# Patient Record
Sex: MALE | Race: WHITE | Employment: OTHER | URBAN - METROPOLITAN AREA
[De-identification: names, ages, dates, MRNs, and addresses within clinical notes are randomized per-mention and may not be internally consistent; named-entity substitution may affect disease eponyms.]

---

## 2019-03-26 ENCOUNTER — HOSPITAL ENCOUNTER (OUTPATIENT)
Dept: GENERAL RADIOLOGY | Age: 79
Discharge: HOME | End: 2019-03-26
Attending: ORTHOPAEDIC SURGERY
Payer: MEDICARE

## 2019-03-26 ENCOUNTER — HOSPITAL ENCOUNTER (OUTPATIENT)
Dept: PREADMISSION TESTING | Age: 79
Discharge: HOME | End: 2019-03-26
Attending: ORTHOPAEDIC SURGERY
Payer: MEDICARE

## 2019-03-26 VITALS
DIASTOLIC BLOOD PRESSURE: 78 MMHG | RESPIRATION RATE: 20 BRPM | WEIGHT: 246.25 LBS | OXYGEN SATURATION: 97 % | SYSTOLIC BLOOD PRESSURE: 146 MMHG | TEMPERATURE: 97.9 F | HEART RATE: 100 BPM | HEIGHT: 71 IN | BODY MASS INDEX: 34.48 KG/M2

## 2019-03-26 LAB
ABO + RH BLD: NORMAL
ALBUMIN SERPL-MCNC: 4 G/DL (ref 3.5–5)
ALBUMIN/GLOB SERPL: 0.9 {RATIO} (ref 1.1–2.2)
ALP SERPL-CCNC: 91 U/L (ref 45–117)
ALT SERPL-CCNC: 26 U/L (ref 12–78)
ANION GAP SERPL CALC-SCNC: 3 MMOL/L (ref 5–15)
APPEARANCE UR: CLEAR
AST SERPL-CCNC: 22 U/L (ref 15–37)
BACTERIA URNS QL MICRO: NEGATIVE /HPF
BILIRUB SERPL-MCNC: 0.5 MG/DL (ref 0.2–1)
BILIRUB UR QL: NEGATIVE
BLOOD GROUP ANTIBODIES SERPL: NORMAL
BUN SERPL-MCNC: 15 MG/DL (ref 6–20)
BUN/CREAT SERPL: 18 (ref 12–20)
CALCIUM SERPL-MCNC: 8.7 MG/DL (ref 8.5–10.1)
CHLORIDE SERPL-SCNC: 108 MMOL/L (ref 97–108)
CO2 SERPL-SCNC: 22 MMOL/L (ref 21–32)
COLOR UR: NORMAL
CREAT SERPL-MCNC: 0.82 MG/DL (ref 0.7–1.3)
EPITH CASTS URNS QL MICRO: NORMAL /LPF
ERYTHROCYTE [DISTWIDTH] IN BLOOD BY AUTOMATED COUNT: 13.8 % (ref 11.5–14.5)
EST. AVERAGE GLUCOSE BLD GHB EST-MCNC: 134 MG/DL
GLOBULIN SER CALC-MCNC: 4.4 G/DL (ref 2–4)
GLUCOSE SERPL-MCNC: 104 MG/DL (ref 65–100)
GLUCOSE UR STRIP.AUTO-MCNC: NEGATIVE MG/DL
HBA1C MFR BLD: 6.3 % (ref 4.2–6.3)
HCT VFR BLD AUTO: 41.4 % (ref 36.6–50.3)
HGB BLD-MCNC: 14.2 G/DL (ref 12.1–17)
HGB UR QL STRIP: NEGATIVE
HYALINE CASTS URNS QL MICRO: NORMAL /LPF (ref 0–5)
INR PPP: 1.6 (ref 0.9–1.1)
KETONES UR QL STRIP.AUTO: NEGATIVE MG/DL
LEUKOCYTE ESTERASE UR QL STRIP.AUTO: NEGATIVE
MCH RBC QN AUTO: 31.6 PG (ref 26–34)
MCHC RBC AUTO-ENTMCNC: 34.3 G/DL (ref 30–36.5)
MCV RBC AUTO: 92.2 FL (ref 80–99)
NITRITE UR QL STRIP.AUTO: NEGATIVE
NRBC # BLD: 0 K/UL (ref 0–0.01)
NRBC BLD-RTO: 0 PER 100 WBC
PH UR STRIP: 5.5 [PH] (ref 5–8)
PLATELET # BLD AUTO: 233 K/UL (ref 150–400)
PMV BLD AUTO: 10.9 FL (ref 8.9–12.9)
POTASSIUM SERPL-SCNC: 4 MMOL/L (ref 3.5–5.1)
PROT SERPL-MCNC: 8.4 G/DL (ref 6.4–8.2)
PROT UR STRIP-MCNC: NEGATIVE MG/DL
PROTHROMBIN TIME: 15.6 SEC (ref 9–11.1)
RBC # BLD AUTO: 4.49 M/UL (ref 4.1–5.7)
RBC #/AREA URNS HPF: NORMAL /HPF (ref 0–5)
SODIUM SERPL-SCNC: 133 MMOL/L (ref 136–145)
SP GR UR REFRACTOMETRY: 1.01 (ref 1–1.03)
SPECIMEN EXP DATE BLD: NORMAL
UA: UC IF INDICATED,UAUC: NORMAL
UROBILINOGEN UR QL STRIP.AUTO: 0.2 EU/DL (ref 0.2–1)
WBC # BLD AUTO: 4.8 K/UL (ref 4.1–11.1)
WBC URNS QL MICRO: NORMAL /HPF (ref 0–4)

## 2019-03-26 PROCEDURE — 80053 COMPREHEN METABOLIC PANEL: CPT

## 2019-03-26 PROCEDURE — 83036 HEMOGLOBIN GLYCOSYLATED A1C: CPT

## 2019-03-26 PROCEDURE — 86900 BLOOD TYPING SEROLOGIC ABO: CPT

## 2019-03-26 PROCEDURE — 85027 COMPLETE CBC AUTOMATED: CPT

## 2019-03-26 PROCEDURE — 71046 X-RAY EXAM CHEST 2 VIEWS: CPT

## 2019-03-26 PROCEDURE — 85610 PROTHROMBIN TIME: CPT

## 2019-03-26 PROCEDURE — 81001 URINALYSIS AUTO W/SCOPE: CPT

## 2019-03-26 PROCEDURE — 36415 COLL VENOUS BLD VENIPUNCTURE: CPT

## 2019-03-26 RX ORDER — SODIUM CHLORIDE, SODIUM LACTATE, POTASSIUM CHLORIDE, CALCIUM CHLORIDE 600; 310; 30; 20 MG/100ML; MG/100ML; MG/100ML; MG/100ML
25 INJECTION, SOLUTION INTRAVENOUS CONTINUOUS
Status: CANCELLED | OUTPATIENT
Start: 2019-04-09

## 2019-03-26 RX ORDER — PREGABALIN 75 MG/1
75 CAPSULE ORAL ONCE
Status: CANCELLED | OUTPATIENT
Start: 2019-04-09 | End: 2019-04-09

## 2019-03-26 RX ORDER — CEFAZOLIN SODIUM 1 G/3ML
2 INJECTION, POWDER, FOR SOLUTION INTRAMUSCULAR; INTRAVENOUS ONCE
Status: CANCELLED | OUTPATIENT
Start: 2019-04-09 | End: 2019-04-09

## 2019-03-26 RX ORDER — CELECOXIB 200 MG/1
200 CAPSULE ORAL ONCE
Status: CANCELLED | OUTPATIENT
Start: 2019-04-09 | End: 2019-04-09

## 2019-03-26 RX ORDER — ACETAMINOPHEN 500 MG
1000 TABLET ORAL ONCE
Status: CANCELLED | OUTPATIENT
Start: 2019-04-09 | End: 2019-04-09

## 2019-03-26 NOTE — PERIOP NOTES
[facility]  Preoperative Instructions        Surgery Date 04/09/19          Time of Arrival 0730    1. On the day of your surgery, please report to the Surgical Services Registration Desk and sign in at your designated time. The Surgery Center is located to the right of the Emergency Room. 2. You must have someone with you to drive you home. You should not drive a car for 24 hours following surgery. Please make arrangements for a friend or family member to stay with you for the first 24 hours after your surgery. 3. Do not have anything to eat or drink (including water, gum, mints, coffee, juice) after midnight except for up to 8 ounces of water up to 2 hours prior to arrival?      . ? This may not apply to medications prescribed by your physician. ?(Please note below the special instructions with medications to take the morning of your procedure.)    4. We recommend you do not drink any alcoholic beverages for 24 hours before and after your surgery. 5. Contact your surgeons office for instructions on the following medications: non-steroidal anti-inflammatory drugs (i.e. Advil, Aleve), vitamins, and supplements. (Some surgeons will want you to stop these medications prior to surgery and others may allow you to take them)  **If you are currently taking Plavix, Coumadin, Aspirin and/or other blood-thinning agents, contact your surgeon for instructions. ** Your surgeon will partner with the physician prescribing these medications to determine if it is safe to stop or if you need to continue taking. Please do not stop taking these medications without instructions from your surgeon    6. Wear comfortable clothes. Wear glasses instead of contacts. Do not bring any money or jewelry. Please bring picture ID, insurance card, and any prearranged co-payment or hospital payment. Do not wear make-up, particularly mascara the morning of your surgery.   Do not wear nail polish, particularly if you are having foot /hand surgery. Wear your hair loose or down, no ponytails, buns, patric pins or clips. All body piercings must be removed. Please shower with antibacterial soap for three consecutive days before and on the morning of surgery, but do not apply any lotions, powders or deodorants after the shower on the day of surgery. Please use a fresh towels after each shower. Please sleep in clean clothes and change bed linens the night before surgery. Please do not shave for 48 hours prior to surgery. Shaving of the face is acceptable. 7. You should understand that if you do not follow these instructions your surgery may be cancelled. If your physical condition changes (I.e. fever, cold or flu) please contact your surgeon as soon as possible. 8. It is important that you be on time. If a situation occurs where you may be late, please call [phone] (OR Holding Area). 9. If you have any questions and or problems, please call [phone] (Pre-admission Testing). 10. Your surgery time may be subject to change. You will receive a phone call the evening prior if your time changes. 11.  If having outpatient surgery, you must have someone to drive you here, stay with you during the duration of your stay, and to drive you home at time of discharge. 12.   In an effort to improve the efficiency, privacy, and safety for all of our Pre-op patients visitors are not allowed in the Holding area. Once you arrive and are registered your family/visitors will be asked to remain in the waiting room. The Pre-op staff will get you from the Surgical Waiting Area and will explain to you and your family/visitors that the Pre-op phase is beginning. The staff will answer any questions and provide instructions for tracking of the patient, by use of the existing tracking number and color-coded status board in the waiting room.   At this time the staff will also ask for your designated spokesperson information in the event that the physician or staff need to provide an update or obtain any pertinent information. The designated spokesperson will be notified if the physician needs to speak to family during the pre-operative phase. If at any time your family/visitors has questions or concerns they may approach the volunteer desk in the waiting area for assistance. Special Instructions: bring CPAP with you to the [facility]    [address] let you know after we talk about medications that you currently take. I understand a pre-operative phone call will be made to verify my surgery time. In the event that I am not available, I give permission for a message to be left on my answering service and/or with another person?   Yes [phone]         ___________________      __________   _________    (Signature of Patient)             (Witness)                (Date and Time)

## 2019-03-26 NOTE — PERIOP NOTES
Preventing Infections Before and After - Your Surgery    IMPORTANT INSTRUCTIONS    Please read and follow these instructions carefully. If you are unable to comply with the below instructions your procedure will be cancelled. Every Night for Three (3) nights before your surgery:  1. Shower with an antibacterial soap, such as Dial, or the soap provided at your preassessment appointment. A shower is better than a bath for cleaning your skin. 2. If needed, ask someone to help you reach all areas of your body. Dont forget to clean your belly button with every shower. The night before your surgery: If you lose your Hibiclens please contact surgery center or you can purchase it at a local pharmacy  1. On the night before your surgery, shower with an antibacterial soap, such as Dial, or the soap provided at your preassessment appointment. 2. With one packet of Hibiclens in hand, turn water off.  3. Apply Hibiclens antiseptic skin cleanser with a clean, freshly washed washcloth. ? Gently apply to your body from chin to toes (except the genital area) and especially the area(s) where your incision(s) will be. ? Leave Hibiclens on your skin for at least 20 seconds. CAUTION: If needed, Hibiclens may be used to clean the folds of skin of the legs (such as in the area of the groin) and on your buttocks and hips. However, do not use Hibiclens above the neck or in the genital area (your bottom) or put inside any area of your body. 4. Turn the water back on and rinse. 5. Dry gently with a clean, freshly washed towel. 6. After your shower, do not use any powder, deodorant, perfumes or lotion. 7. Use clean, freshly washed towels and washcloths every time you shower. 8. Wear clean, freshly washed pajamas to bed the night before surgery. 9. Sleep on clean, freshly washed sheets. 10. Do not allow pets to sleep in your bed with you. The Morning of your surgery:  1.  Shower again thoroughly with an antibacterial soap, such as Dial or the soap provided at your preassessment appointment. If needed, ask someone for help to reach all areas of your body. Dont forget to clean your belly button! Rinse. 2. Dry gently with a clean, freshly washed towel. 3. After your shower, do not use any powder, deodorant, perfumes or lotion prior to surgery. 4. Put on clean, freshly washed clothing. Tips to help prevent infections after your surgery:  1. Protect your surgical wound from germs:  ? Hand washing is the most important thing you and your caregivers can do to prevent infections. ? Keep your bandage clean and dry! ? Do not touch your surgical wound. 2. Use clean, freshly washed towels and washcloths every time you shower; do not share bath linens with others. 3. Until your surgical wound is healed, wear clothing and sleep on bed linens each day that are clean and freshly washed. 4. Do not allow pets to sleep in your bed with you or touch your surgical wound. 5. Do not smoke - smoking delays wound healing. This may be a good time to stop smoking. 6. If you have diabetes, it is important for you to manage your blood sugar levels properly before your surgery as well as after your surgery. Poorly managed blood sugar levels slow down wound healing and prevent you from healing completely. If you lose your Hibiclens, please call the [facility], or it is available for purchase at your pharmacy.                ___________________      ___________________      ________________  (Signature of Patient)          (Witness)                   (Date and Time)

## 2019-03-26 NOTE — PERIOP NOTES
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 He said that pt can be evaluated the DOS and not necessary today.

## 2019-03-26 NOTE — PERIOP NOTES
Pt is going to [facility] to be with his wife who is having a heart catheterization, so he wont be able to get me his medications until atleast tomorrow. My phone number was provided for him to call me with his medications.

## 2019-03-26 NOTE — PERIOP NOTES
faxd pacemaker check form to [address] in Dr shaver's office. Paper confirmation in patients chart.

## 2019-03-26 NOTE — PERIOP NOTES
Pt didn't have his stent or pacemaker cards with him. Instructed him to bring the DOS. Pt verbalizes understanding.

## 2019-03-26 NOTE — PERIOP NOTES
Incentive Spirometer        Using the incentive spirometer helps expand the small air sacs of your lungs, helps you breathe deeply, and helps improve your lung function. Use your incentive spirometer twice a day (10 breaths each time) prior to surgery. How to Use Your Incentive Spirometer:  1. Hold the incentive spirometer in an upright position. 2. Breathe out as usual.   3. Place the mouthpiece in your mouth and seal your lips tightly around it. 4. Take a deep breath. Breathe in slowly and as deeply as possible. Keep the blue flow rate guide between the arrows. 5. Hold your breath as long as possible. Then exhale slowly and allow the piston to fall to the bottom of the column. 6. Rest for a few seconds and repeat steps one through five at least 10 times. PAT Tidal Volume_______2500___________  x______2__________  Date____________03/26/19___________    Bulah Ramp THE INCENTIVE SPIROMETER WITH YOU TO THE HOSPITAL ON THE DAY OF YOUR SURGERY. Opportunity given to ask and answer questions as well as to observe return demonstration.     Patient signature_____________________________    Witness____________________________

## 2019-03-27 LAB
BACTERIA SPEC CULT: NORMAL
BACTERIA SPEC CULT: NORMAL
SERVICE CMNT-IMP: NORMAL

## 2019-03-27 RX ORDER — CLOPIDOGREL BISULFATE 75 MG/1
75 TABLET ORAL DAILY
COMMUNITY
End: 2021-07-02

## 2019-03-27 NOTE — PERIOP NOTES
Godwin Caballero NP, reviewed PT/INR results and orders entered to repeat DOS. No additional orders.

## 2019-03-27 NOTE — PERIOP NOTES
Patients wife just called and gave me patients medication list. Instructed for pt to take atenolol the morning of with a sip of water. She also confirmed that his cardiologist said for pt to stop his coumadin and plavix 5 days prior to surgery. She will make sure that pt. Complies.

## 2019-03-28 RX ORDER — HEPARIN SODIUM 1000 [USP'U]/ML
1000 INJECTION, SOLUTION INTRAVENOUS; SUBCUTANEOUS ONCE
Status: CANCELLED | OUTPATIENT
Start: 2019-04-09 | End: 2019-04-09

## 2019-03-28 RX ORDER — PREGABALIN 25 MG/1
75 CAPSULE ORAL ONCE
Status: CANCELLED | OUTPATIENT
Start: 2019-04-09 | End: 2019-04-09

## 2019-03-28 RX ORDER — CELECOXIB 100 MG/1
200 CAPSULE ORAL ONCE
Status: CANCELLED | OUTPATIENT
Start: 2019-04-09 | End: 2019-04-09

## 2019-03-28 RX ORDER — CEFAZOLIN SODIUM/WATER 2 G/20 ML
2 SYRINGE (ML) INTRAVENOUS ONCE
Status: CANCELLED | OUTPATIENT
Start: 2019-04-09 | End: 2019-04-09

## 2019-03-28 RX ORDER — ACETAMINOPHEN 500 MG
1000 TABLET ORAL ONCE
Status: CANCELLED | OUTPATIENT
Start: 2019-04-09 | End: 2019-04-09

## 2019-04-01 NOTE — ADVANCED PRACTICE NURSE
Results from PAT assessment faxed to PCP for further recommendations prior to surgery. Confirmation of fax received.

## 2019-04-02 NOTE — PERIOP NOTES
Tried to call pts PCP to see if they plan on any f/u on labs and CXR ? Office was already closed for the day, [facility] tomorrow.

## 2019-04-03 NOTE — PERIOP NOTES
Bucky Hu, Dr Chely Acosta nurse, said that she was going to send DR Brian Gunderson a message about pts cxr and lab results that were faxed and see if she has any recommendations prior to surgery. She is on vacation this week but checks her messages regularly and Bucky Hu will call me back with her response.

## 2019-04-04 NOTE — PERIOP NOTES
Natalie Monge from Dr Reynold Bermeo office said that she doesn't have a response from Dr Reynold Bermeo yet. WCB.

## 2019-04-05 NOTE — PERIOP NOTES
PC to Dr Paresh Pedroza requested Pre-op Clearance Note, office states pt did not have a clearance exam with them, last office visit was a FU in December 2018. PC to Dr Lacey Birch , spoke with Sai العلي, she state he has not had a Clearance visit , was last seen in January 2019. PC to pt and he states he was not seen by Dr Phuong Knight for a clearance visit but had given Dr Phuong Knight the Clearance form to fill out and mail back to Dr Robb Martin. PC to Dr Robb Martin office to verify, VM left for Martin Moody to call ASAP to verify they do have a Clearance form. Ailyn Max RN,  Above information relayed to charge nurse Giuliana Blankenship RN.  Ailyn Max RN

## 2019-04-05 NOTE — PERIOP NOTES
Received fax from Camille Gonzales at Dr Jennie Valverde office, [address] [place] [address] for Medical Clearance Document\" to Dr Batool Reilly. PC to Luan PONCE on request for  actual Clearance Document stating the pt has been cleared for surgery. Request call back to nurse manager Macrina Hodges RN ASAP today.  Yovana Rocha RN

## 2019-04-08 NOTE — PERIOP NOTES
Pre-op call made and patient given TOA. Patient instructed may have up to 8 ounces of water up to 6 am and then NPO. Patient verbalized understanding.

## 2019-04-09 ENCOUNTER — ANESTHESIA EVENT (OUTPATIENT)
Dept: SURGERY | Age: 79
DRG: 470 | End: 2019-04-09
Payer: MEDICARE

## 2019-04-09 ENCOUNTER — ANESTHESIA (OUTPATIENT)
Dept: SURGERY | Age: 79
DRG: 470 | End: 2019-04-09
Payer: MEDICARE

## 2019-04-09 ENCOUNTER — HOSPITAL ENCOUNTER (INPATIENT)
Age: 79
LOS: 1 days | Discharge: HOME HEALTH | DRG: 470 | End: 2019-04-10
Attending: ORTHOPAEDIC SURGERY | Admitting: ORTHOPAEDIC SURGERY
Payer: MEDICARE

## 2019-04-09 DIAGNOSIS — Z96.652 S/P TOTAL KNEE REPLACEMENT, LEFT: Primary | ICD-10-CM

## 2019-04-09 PROBLEM — M17.9 DJD (DEGENERATIVE JOINT DISEASE) OF KNEE: Status: ACTIVE | Noted: 2019-04-09

## 2019-04-09 PROBLEM — M17.12 OSTEOARTHROSIS, LOCALIZED, PRIMARY, KNEE, LEFT: Status: ACTIVE | Noted: 2019-04-09

## 2019-04-09 LAB
GLUCOSE BLD STRIP.AUTO-MCNC: 120 MG/DL (ref 65–100)
GLUCOSE BLD STRIP.AUTO-MCNC: 141 MG/DL (ref 65–100)
GLUCOSE BLD STRIP.AUTO-MCNC: 145 MG/DL (ref 65–100)
GLUCOSE BLD STRIP.AUTO-MCNC: 155 MG/DL (ref 65–100)
INR BLD: 1.1 (ref 0.9–1.2)
SERVICE CMNT-IMP: ABNORMAL

## 2019-04-09 PROCEDURE — 77030039497 HC CST PAD STERILE CHCS -A: Performed by: ORTHOPAEDIC SURGERY

## 2019-04-09 PROCEDURE — 0SRD0J9 REPLACEMENT OF LEFT KNEE JOINT WITH SYNTHETIC SUBSTITUTE, CEMENTED, OPEN APPROACH: ICD-10-PCS | Performed by: ORTHOPAEDIC SURGERY

## 2019-04-09 PROCEDURE — 74011250636 HC RX REV CODE- 250/636

## 2019-04-09 PROCEDURE — 77030013079 HC BLNKT BAIR HGGR 3M -A: Performed by: ANESTHESIOLOGY

## 2019-04-09 PROCEDURE — 77030012406 HC DRN WND PENRS BARD -A: Performed by: ORTHOPAEDIC SURGERY

## 2019-04-09 PROCEDURE — 97162 PT EVAL MOD COMPLEX 30 MIN: CPT

## 2019-04-09 PROCEDURE — 76010000162 HC OR TIME 1.5 TO 2 HR INTENSV-TIER 1: Performed by: ORTHOPAEDIC SURGERY

## 2019-04-09 PROCEDURE — 77030020782 HC GWN BAIR PAWS FLX 3M -B

## 2019-04-09 PROCEDURE — 77030006835 HC BLD SAW SAG STRY -B: Performed by: ORTHOPAEDIC SURGERY

## 2019-04-09 PROCEDURE — 77030018846 HC SOL IRR STRL H20 ICUM -A: Performed by: ORTHOPAEDIC SURGERY

## 2019-04-09 PROCEDURE — 74011250636 HC RX REV CODE- 250/636: Performed by: ANESTHESIOLOGY

## 2019-04-09 PROCEDURE — 77030032490 HC SLV COMPR SCD KNE COVD -B: Performed by: ORTHOPAEDIC SURGERY

## 2019-04-09 PROCEDURE — 77030018836 HC SOL IRR NACL ICUM -A: Performed by: ORTHOPAEDIC SURGERY

## 2019-04-09 PROCEDURE — 77030018673: Performed by: ORTHOPAEDIC SURGERY

## 2019-04-09 PROCEDURE — 77030008684 HC TU ET CUF COVD -B: Performed by: ANESTHESIOLOGY

## 2019-04-09 PROCEDURE — 77030002912 HC SUT ETHBND J&J -A: Performed by: ORTHOPAEDIC SURGERY

## 2019-04-09 PROCEDURE — 74011250637 HC RX REV CODE- 250/637: Performed by: ORTHOPAEDIC SURGERY

## 2019-04-09 PROCEDURE — 85610 PROTHROMBIN TIME: CPT

## 2019-04-09 PROCEDURE — 77030000032 HC CUF TRNQT ZIMM -B: Performed by: ORTHOPAEDIC SURGERY

## 2019-04-09 PROCEDURE — 76060000034 HC ANESTHESIA 1.5 TO 2 HR: Performed by: ORTHOPAEDIC SURGERY

## 2019-04-09 PROCEDURE — C1776 JOINT DEVICE (IMPLANTABLE): HCPCS | Performed by: ORTHOPAEDIC SURGERY

## 2019-04-09 PROCEDURE — 77030031139 HC SUT VCRL2 J&J -A: Performed by: ORTHOPAEDIC SURGERY

## 2019-04-09 PROCEDURE — 74011000250 HC RX REV CODE- 250: Performed by: ORTHOPAEDIC SURGERY

## 2019-04-09 PROCEDURE — 74011000250 HC RX REV CODE- 250: Performed by: ANESTHESIOLOGY

## 2019-04-09 PROCEDURE — 97116 GAIT TRAINING THERAPY: CPT

## 2019-04-09 PROCEDURE — 77030028907 HC WRP KNEE WO BGS SOLM -B

## 2019-04-09 PROCEDURE — 65270000029 HC RM PRIVATE

## 2019-04-09 PROCEDURE — 77030008467 HC STPLR SKN COVD -B: Performed by: ORTHOPAEDIC SURGERY

## 2019-04-09 PROCEDURE — 77030010785: Performed by: ORTHOPAEDIC SURGERY

## 2019-04-09 PROCEDURE — 76210000006 HC OR PH I REC 0.5 TO 1 HR: Performed by: ORTHOPAEDIC SURGERY

## 2019-04-09 PROCEDURE — 77030013708 HC HNDPC SUC IRR PULS STRY –B: Performed by: ORTHOPAEDIC SURGERY

## 2019-04-09 PROCEDURE — 74011250636 HC RX REV CODE- 250/636: Performed by: ORTHOPAEDIC SURGERY

## 2019-04-09 PROCEDURE — 77030011640 HC PAD GRND REM COVD -A: Performed by: ORTHOPAEDIC SURGERY

## 2019-04-09 PROCEDURE — C1713 ANCHOR/SCREW BN/BN,TIS/BN: HCPCS | Performed by: ORTHOPAEDIC SURGERY

## 2019-04-09 PROCEDURE — 77030033067 HC SUT PDO STRATFX SPIR J&J -B: Performed by: ORTHOPAEDIC SURGERY

## 2019-04-09 PROCEDURE — 74011000250 HC RX REV CODE- 250

## 2019-04-09 PROCEDURE — 82962 GLUCOSE BLOOD TEST: CPT

## 2019-04-09 DEVICE — IMPLANTABLE DEVICE
Type: IMPLANTABLE DEVICE | Site: KNEE | Status: FUNCTIONAL
Brand: VANGUARD® KNEE SYSTEM

## 2019-04-09 DEVICE — IMPLANTABLE DEVICE
Type: IMPLANTABLE DEVICE | Site: KNEE | Status: FUNCTIONAL
Brand: BIOMET KNEE SYSTEM

## 2019-04-09 DEVICE — KNEE CEM FEM/TIB ARC/PAT -- VANGUARD K1: Type: IMPLANTABLE DEVICE | Status: FUNCTIONAL

## 2019-04-09 DEVICE — CEMENT BNE BIOMET HV R1X40GM --: Type: IMPLANTABLE DEVICE | Site: KNEE | Status: FUNCTIONAL

## 2019-04-09 DEVICE — TRAY TIB L79MM KNEE CO CHROM I BEAM MOD INTLOK CEM VANGUARD: Type: IMPLANTABLE DEVICE | Site: KNEE | Status: FUNCTIONAL

## 2019-04-09 RX ORDER — ONDANSETRON 2 MG/ML
INJECTION INTRAMUSCULAR; INTRAVENOUS AS NEEDED
Status: DISCONTINUED | OUTPATIENT
Start: 2019-04-09 | End: 2019-04-09 | Stop reason: HOSPADM

## 2019-04-09 RX ORDER — KETOROLAC TROMETHAMINE 30 MG/ML
15 INJECTION, SOLUTION INTRAMUSCULAR; INTRAVENOUS EVERY 6 HOURS
Status: COMPLETED | OUTPATIENT
Start: 2019-04-09 | End: 2019-04-10

## 2019-04-09 RX ORDER — CITALOPRAM 20 MG/1
20 TABLET, FILM COATED ORAL DAILY
Status: DISCONTINUED | OUTPATIENT
Start: 2019-04-10 | End: 2019-04-10 | Stop reason: HOSPADM

## 2019-04-09 RX ORDER — ONDANSETRON 4 MG/1
4 TABLET, ORALLY DISINTEGRATING ORAL
Status: DISCONTINUED | OUTPATIENT
Start: 2019-04-11 | End: 2019-04-10 | Stop reason: HOSPADM

## 2019-04-09 RX ORDER — OXYCODONE HYDROCHLORIDE 5 MG/1
10 TABLET ORAL
Status: DISCONTINUED | OUTPATIENT
Start: 2019-04-09 | End: 2019-04-10 | Stop reason: HOSPADM

## 2019-04-09 RX ORDER — MORPHINE SULFATE 2 MG/ML
1 INJECTION, SOLUTION INTRAMUSCULAR; INTRAVENOUS
Status: ACTIVE | OUTPATIENT
Start: 2019-04-09 | End: 2019-04-10

## 2019-04-09 RX ORDER — SODIUM CHLORIDE 0.9 % (FLUSH) 0.9 %
5-40 SYRINGE (ML) INJECTION EVERY 8 HOURS
Status: DISCONTINUED | OUTPATIENT
Start: 2019-04-09 | End: 2019-04-09 | Stop reason: HOSPADM

## 2019-04-09 RX ORDER — ONDANSETRON 2 MG/ML
4 INJECTION INTRAMUSCULAR; INTRAVENOUS
Status: ACTIVE | OUTPATIENT
Start: 2019-04-09 | End: 2019-04-10

## 2019-04-09 RX ORDER — ROCURONIUM BROMIDE 10 MG/ML
INJECTION, SOLUTION INTRAVENOUS AS NEEDED
Status: DISCONTINUED | OUTPATIENT
Start: 2019-04-09 | End: 2019-04-09 | Stop reason: HOSPADM

## 2019-04-09 RX ORDER — SODIUM CHLORIDE 0.9 % (FLUSH) 0.9 %
5-40 SYRINGE (ML) INJECTION AS NEEDED
Status: DISCONTINUED | OUTPATIENT
Start: 2019-04-09 | End: 2019-04-10 | Stop reason: HOSPADM

## 2019-04-09 RX ORDER — ADHESIVE BANDAGE
30 BANDAGE TOPICAL DAILY PRN
Status: DISCONTINUED | OUTPATIENT
Start: 2019-04-10 | End: 2019-04-10 | Stop reason: HOSPADM

## 2019-04-09 RX ORDER — GEMFIBROZIL 600 MG/1
600 TABLET, FILM COATED ORAL
Status: DISCONTINUED | OUTPATIENT
Start: 2019-04-09 | End: 2019-04-10 | Stop reason: HOSPADM

## 2019-04-09 RX ORDER — SODIUM CHLORIDE 9 MG/ML
125 INJECTION, SOLUTION INTRAVENOUS CONTINUOUS
Status: DISPENSED | OUTPATIENT
Start: 2019-04-09 | End: 2019-04-10

## 2019-04-09 RX ORDER — ENOXAPARIN SODIUM 100 MG/ML
40 INJECTION SUBCUTANEOUS EVERY 24 HOURS
Status: DISCONTINUED | OUTPATIENT
Start: 2019-04-10 | End: 2019-04-10 | Stop reason: HOSPADM

## 2019-04-09 RX ORDER — GLYCOPYRROLATE 0.2 MG/ML
INJECTION INTRAMUSCULAR; INTRAVENOUS AS NEEDED
Status: DISCONTINUED | OUTPATIENT
Start: 2019-04-09 | End: 2019-04-09 | Stop reason: HOSPADM

## 2019-04-09 RX ORDER — CETIRIZINE HCL 10 MG
10 TABLET ORAL
Status: DISCONTINUED | OUTPATIENT
Start: 2019-04-09 | End: 2019-04-10 | Stop reason: HOSPADM

## 2019-04-09 RX ORDER — FENTANYL CITRATE 50 UG/ML
INJECTION, SOLUTION INTRAMUSCULAR; INTRAVENOUS AS NEEDED
Status: DISCONTINUED | OUTPATIENT
Start: 2019-04-09 | End: 2019-04-09 | Stop reason: HOSPADM

## 2019-04-09 RX ORDER — CEFAZOLIN SODIUM/WATER 2 G/20 ML
2 SYRINGE (ML) INTRAVENOUS EVERY 8 HOURS
Status: COMPLETED | OUTPATIENT
Start: 2019-04-09 | End: 2019-04-10

## 2019-04-09 RX ORDER — DEXAMETHASONE SODIUM PHOSPHATE 4 MG/ML
10 INJECTION, SOLUTION INTRA-ARTICULAR; INTRALESIONAL; INTRAMUSCULAR; INTRAVENOUS; SOFT TISSUE ONCE
Status: COMPLETED | OUTPATIENT
Start: 2019-04-10 | End: 2019-04-10

## 2019-04-09 RX ORDER — TAMSULOSIN HYDROCHLORIDE 0.4 MG/1
0.4 CAPSULE ORAL DAILY
Status: DISCONTINUED | OUTPATIENT
Start: 2019-04-10 | End: 2019-04-10 | Stop reason: HOSPADM

## 2019-04-09 RX ORDER — DIPHENHYDRAMINE HCL 12.5MG/5ML
12.5 ELIXIR ORAL
Status: DISCONTINUED | OUTPATIENT
Start: 2019-04-09 | End: 2019-04-10 | Stop reason: HOSPADM

## 2019-04-09 RX ORDER — CELECOXIB 200 MG/1
200 CAPSULE ORAL ONCE
Status: COMPLETED | OUTPATIENT
Start: 2019-04-09 | End: 2019-04-09

## 2019-04-09 RX ORDER — FENTANYL CITRATE 50 UG/ML
INJECTION, SOLUTION INTRAMUSCULAR; INTRAVENOUS AS NEEDED
Status: DISCONTINUED | OUTPATIENT
Start: 2019-04-09 | End: 2019-04-09

## 2019-04-09 RX ORDER — DIPHENHYDRAMINE HYDROCHLORIDE 50 MG/ML
12.5 INJECTION, SOLUTION INTRAMUSCULAR; INTRAVENOUS AS NEEDED
Status: DISCONTINUED | OUTPATIENT
Start: 2019-04-09 | End: 2019-04-09 | Stop reason: HOSPADM

## 2019-04-09 RX ORDER — PANTOPRAZOLE SODIUM 40 MG/1
40 TABLET, DELAYED RELEASE ORAL EVERY EVENING
Status: DISCONTINUED | OUTPATIENT
Start: 2019-04-09 | End: 2019-04-10 | Stop reason: HOSPADM

## 2019-04-09 RX ORDER — SODIUM CHLORIDE 0.9 % (FLUSH) 0.9 %
5-40 SYRINGE (ML) INJECTION EVERY 8 HOURS
Status: DISCONTINUED | OUTPATIENT
Start: 2019-04-09 | End: 2019-04-10 | Stop reason: HOSPADM

## 2019-04-09 RX ORDER — PROPOFOL 10 MG/ML
INJECTION, EMULSION INTRAVENOUS AS NEEDED
Status: DISCONTINUED | OUTPATIENT
Start: 2019-04-09 | End: 2019-04-09 | Stop reason: HOSPADM

## 2019-04-09 RX ORDER — CEFAZOLIN SODIUM/WATER 2 G/20 ML
2 SYRINGE (ML) INTRAVENOUS ONCE
Status: DISCONTINUED | OUTPATIENT
Start: 2019-04-09 | End: 2019-04-09 | Stop reason: HOSPADM

## 2019-04-09 RX ORDER — CEFAZOLIN SODIUM IN 0.9 % NACL 2 G/100 ML
PLASTIC BAG, INJECTION (ML) INTRAVENOUS AS NEEDED
Status: DISCONTINUED | OUTPATIENT
Start: 2019-04-09 | End: 2019-04-09 | Stop reason: HOSPADM

## 2019-04-09 RX ORDER — OXYCODONE HYDROCHLORIDE 5 MG/1
5 TABLET ORAL
Status: DISCONTINUED | OUTPATIENT
Start: 2019-04-09 | End: 2019-04-10 | Stop reason: HOSPADM

## 2019-04-09 RX ORDER — FACIAL-BODY WIPES
10 EACH TOPICAL DAILY PRN
Status: DISCONTINUED | OUTPATIENT
Start: 2019-04-11 | End: 2019-04-10 | Stop reason: HOSPADM

## 2019-04-09 RX ORDER — SODIUM CHLORIDE, SODIUM LACTATE, POTASSIUM CHLORIDE, CALCIUM CHLORIDE 600; 310; 30; 20 MG/100ML; MG/100ML; MG/100ML; MG/100ML
25 INJECTION, SOLUTION INTRAVENOUS CONTINUOUS
Status: DISCONTINUED | OUTPATIENT
Start: 2019-04-09 | End: 2019-04-09 | Stop reason: HOSPADM

## 2019-04-09 RX ORDER — SODIUM CHLORIDE 0.9 % (FLUSH) 0.9 %
5-40 SYRINGE (ML) INJECTION AS NEEDED
Status: DISCONTINUED | OUTPATIENT
Start: 2019-04-09 | End: 2019-04-09 | Stop reason: HOSPADM

## 2019-04-09 RX ORDER — AMOXICILLIN 250 MG
1 CAPSULE ORAL 2 TIMES DAILY
Status: DISCONTINUED | OUTPATIENT
Start: 2019-04-09 | End: 2019-04-10 | Stop reason: HOSPADM

## 2019-04-09 RX ORDER — METOPROLOL TARTRATE 5 MG/5ML
INJECTION INTRAVENOUS
Status: DISPENSED
Start: 2019-04-09 | End: 2019-04-10

## 2019-04-09 RX ORDER — HEPARIN SODIUM 1000 [USP'U]/ML
1000 INJECTION, SOLUTION INTRAVENOUS; SUBCUTANEOUS ONCE
Status: DISCONTINUED | OUTPATIENT
Start: 2019-04-09 | End: 2019-04-09 | Stop reason: HOSPADM

## 2019-04-09 RX ORDER — HYDROMORPHONE HYDROCHLORIDE 1 MG/ML
0.2 INJECTION, SOLUTION INTRAMUSCULAR; INTRAVENOUS; SUBCUTANEOUS
Status: DISCONTINUED | OUTPATIENT
Start: 2019-04-09 | End: 2019-04-09 | Stop reason: HOSPADM

## 2019-04-09 RX ORDER — DEXAMETHASONE SODIUM PHOSPHATE 4 MG/ML
INJECTION, SOLUTION INTRA-ARTICULAR; INTRALESIONAL; INTRAMUSCULAR; INTRAVENOUS; SOFT TISSUE AS NEEDED
Status: DISCONTINUED | OUTPATIENT
Start: 2019-04-09 | End: 2019-04-09 | Stop reason: HOSPADM

## 2019-04-09 RX ORDER — PREGABALIN 75 MG/1
75 CAPSULE ORAL ONCE
Status: COMPLETED | OUTPATIENT
Start: 2019-04-09 | End: 2019-04-09

## 2019-04-09 RX ORDER — SODIUM CHLORIDE, SODIUM LACTATE, POTASSIUM CHLORIDE, CALCIUM CHLORIDE 600; 310; 30; 20 MG/100ML; MG/100ML; MG/100ML; MG/100ML
25 INJECTION, SOLUTION INTRAVENOUS CONTINUOUS
Status: DISCONTINUED | OUTPATIENT
Start: 2019-04-09 | End: 2019-04-09

## 2019-04-09 RX ORDER — ACETAMINOPHEN 500 MG
1000 TABLET ORAL ONCE
Status: COMPLETED | OUTPATIENT
Start: 2019-04-09 | End: 2019-04-09

## 2019-04-09 RX ORDER — DEXAMETHASONE SODIUM PHOSPHATE 100 MG/10ML
INJECTION INTRAMUSCULAR; INTRAVENOUS AS NEEDED
Status: DISCONTINUED | OUTPATIENT
Start: 2019-04-09 | End: 2019-04-09

## 2019-04-09 RX ORDER — PRAVASTATIN SODIUM 40 MG/1
40 TABLET ORAL
Status: DISCONTINUED | OUTPATIENT
Start: 2019-04-09 | End: 2019-04-10 | Stop reason: HOSPADM

## 2019-04-09 RX ORDER — HEPARIN SODIUM 1000 [USP'U]/ML
INJECTION, SOLUTION INTRAVENOUS; SUBCUTANEOUS AS NEEDED
Status: DISCONTINUED | OUTPATIENT
Start: 2019-04-09 | End: 2019-04-09 | Stop reason: HOSPADM

## 2019-04-09 RX ORDER — NEOSTIGMINE METHYLSULFATE 1 MG/ML
INJECTION INTRAVENOUS AS NEEDED
Status: DISCONTINUED | OUTPATIENT
Start: 2019-04-09 | End: 2019-04-09 | Stop reason: HOSPADM

## 2019-04-09 RX ORDER — POLYETHYLENE GLYCOL 3350 17 G/17G
17 POWDER, FOR SOLUTION ORAL DAILY
Status: DISCONTINUED | OUTPATIENT
Start: 2019-04-10 | End: 2019-04-10 | Stop reason: HOSPADM

## 2019-04-09 RX ORDER — FENTANYL CITRATE 50 UG/ML
25 INJECTION, SOLUTION INTRAMUSCULAR; INTRAVENOUS
Status: DISCONTINUED | OUTPATIENT
Start: 2019-04-09 | End: 2019-04-09 | Stop reason: HOSPADM

## 2019-04-09 RX ORDER — EPHEDRINE SULFATE 50 MG/ML
INJECTION, SOLUTION INTRAVENOUS AS NEEDED
Status: DISCONTINUED | OUTPATIENT
Start: 2019-04-09 | End: 2019-04-09 | Stop reason: HOSPADM

## 2019-04-09 RX ORDER — ATENOLOL 25 MG/1
25 TABLET ORAL DAILY
Status: DISCONTINUED | OUTPATIENT
Start: 2019-04-10 | End: 2019-04-10 | Stop reason: HOSPADM

## 2019-04-09 RX ORDER — NALOXONE HYDROCHLORIDE 0.4 MG/ML
0.4 INJECTION, SOLUTION INTRAMUSCULAR; INTRAVENOUS; SUBCUTANEOUS AS NEEDED
Status: DISCONTINUED | OUTPATIENT
Start: 2019-04-09 | End: 2019-04-10 | Stop reason: HOSPADM

## 2019-04-09 RX ORDER — PHENYLEPHRINE HCL IN 0.9% NACL 0.4MG/10ML
SYRINGE (ML) INTRAVENOUS AS NEEDED
Status: DISCONTINUED | OUTPATIENT
Start: 2019-04-09 | End: 2019-04-09 | Stop reason: HOSPADM

## 2019-04-09 RX ORDER — METOPROLOL TARTRATE 5 MG/5ML
2 INJECTION INTRAVENOUS ONCE
Status: COMPLETED | OUTPATIENT
Start: 2019-04-09 | End: 2019-04-09

## 2019-04-09 RX ORDER — ACETAMINOPHEN 325 MG/1
650 TABLET ORAL EVERY 6 HOURS
Status: DISCONTINUED | OUTPATIENT
Start: 2019-04-09 | End: 2019-04-10 | Stop reason: HOSPADM

## 2019-04-09 RX ADMIN — WARFARIN SODIUM 7.5 MG: 5 TABLET ORAL at 17:08

## 2019-04-09 RX ADMIN — SODIUM CHLORIDE, SODIUM LACTATE, POTASSIUM CHLORIDE, AND CALCIUM CHLORIDE 25 ML/HR: 600; 310; 30; 20 INJECTION, SOLUTION INTRAVENOUS at 08:49

## 2019-04-09 RX ADMIN — PRAVASTATIN SODIUM 40 MG: 40 TABLET ORAL at 21:01

## 2019-04-09 RX ADMIN — EPHEDRINE SULFATE 10 MG: 50 INJECTION, SOLUTION INTRAVENOUS at 10:44

## 2019-04-09 RX ADMIN — FENTANYL CITRATE 50 MCG: 50 INJECTION, SOLUTION INTRAMUSCULAR; INTRAVENOUS at 10:43

## 2019-04-09 RX ADMIN — FENTANYL CITRATE 50 MCG: 50 INJECTION, SOLUTION INTRAMUSCULAR; INTRAVENOUS at 10:37

## 2019-04-09 RX ADMIN — Medication 40 MCG: at 10:44

## 2019-04-09 RX ADMIN — FENTANYL CITRATE 50 MCG: 50 INJECTION, SOLUTION INTRAMUSCULAR; INTRAVENOUS at 11:04

## 2019-04-09 RX ADMIN — PROPOFOL 150 MG: 10 INJECTION, EMULSION INTRAVENOUS at 10:34

## 2019-04-09 RX ADMIN — GLYCOPYRROLATE 0.6 MG: 0.2 INJECTION INTRAMUSCULAR; INTRAVENOUS at 11:55

## 2019-04-09 RX ADMIN — Medication 40 MCG: at 11:34

## 2019-04-09 RX ADMIN — ACETAMINOPHEN 1000 MG: 500 TABLET ORAL at 08:49

## 2019-04-09 RX ADMIN — ONDANSETRON 4 MG: 2 INJECTION INTRAMUSCULAR; INTRAVENOUS at 11:19

## 2019-04-09 RX ADMIN — SODIUM CHLORIDE 125 ML/HR: 900 INJECTION, SOLUTION INTRAVENOUS at 12:15

## 2019-04-09 RX ADMIN — CELECOXIB 200 MG: 200 CAPSULE ORAL at 09:00

## 2019-04-09 RX ADMIN — DEXAMETHASONE SODIUM PHOSPHATE 10 MG: 4 INJECTION, SOLUTION INTRA-ARTICULAR; INTRALESIONAL; INTRAMUSCULAR; INTRAVENOUS; SOFT TISSUE at 11:00

## 2019-04-09 RX ADMIN — NEOSTIGMINE METHYLSULFATE 3 MG: 1 INJECTION INTRAVENOUS at 11:55

## 2019-04-09 RX ADMIN — Medication 40 MCG: at 10:48

## 2019-04-09 RX ADMIN — SODIUM CHLORIDE 125 ML/HR: 900 INJECTION, SOLUTION INTRAVENOUS at 21:02

## 2019-04-09 RX ADMIN — KETOROLAC TROMETHAMINE 15 MG: 30 INJECTION, SOLUTION INTRAMUSCULAR; INTRAVENOUS at 19:34

## 2019-04-09 RX ADMIN — PREGABALIN 75 MG: 75 CAPSULE ORAL at 08:49

## 2019-04-09 RX ADMIN — GEMFIBROZIL 600 MG: 600 TABLET ORAL at 16:22

## 2019-04-09 RX ADMIN — ACETAMINOPHEN 650 MG: 325 TABLET ORAL at 16:28

## 2019-04-09 RX ADMIN — PROPOFOL 50 MG: 10 INJECTION, EMULSION INTRAVENOUS at 11:55

## 2019-04-09 RX ADMIN — PANTOPRAZOLE SODIUM 40 MG: 40 TABLET, DELAYED RELEASE ORAL at 17:08

## 2019-04-09 RX ADMIN — METOPROLOL TARTRATE 2 MG: 5 INJECTION INTRAVENOUS at 12:22

## 2019-04-09 RX ADMIN — Medication 2 G: at 10:30

## 2019-04-09 RX ADMIN — HEPARIN SODIUM 1000 UNITS: 1000 INJECTION, SOLUTION INTRAVENOUS; SUBCUTANEOUS at 10:38

## 2019-04-09 RX ADMIN — SODIUM CHLORIDE 500 ML: 900 INJECTION, SOLUTION INTRAVENOUS at 16:24

## 2019-04-09 RX ADMIN — Medication 2 G: at 17:07

## 2019-04-09 RX ADMIN — FENTANYL CITRATE 50 MCG: 50 INJECTION, SOLUTION INTRAMUSCULAR; INTRAVENOUS at 11:02

## 2019-04-09 RX ADMIN — ROCURONIUM BROMIDE 40 MG: 10 INJECTION, SOLUTION INTRAVENOUS at 10:36

## 2019-04-09 NOTE — ANESTHESIA POSTPROCEDURE EVALUATION
Procedure(s): LEFT TOTAL KNEE ARTHROPLASTY. general 
 
Anesthesia Post Evaluation Patient location during evaluation: PACU Note status: Adequate. Level of consciousness: responsive to verbal stimuli and sleepy but conscious Pain management: satisfactory to patient Airway patency: patent Anesthetic complications: no 
Cardiovascular status: acceptable Respiratory status: acceptable Hydration status: acceptable Comments: +Post-Anesthesia Evaluation and Assessment Patient: Lizbet Castillo MRN: [id number]  SSN: [id number] YOB: 1940  Age: 70 y.o. Sex: male Cardiovascular Function/Vital Signs BP 91/67   Pulse 77   Temp 36.9 °C (98.5 °F)   Resp 16   Ht 5' 11\" (1.803 m)   Wt 108.7 kg (239 lb 10.2 oz)   SpO2 97%   BMI 33.42 kg/m² Patient is status post Procedure(s): LEFT TOTAL KNEE ARTHROPLASTY. Nausea/Vomiting: Controlled. Postoperative hydration reviewed and adequate. Pain: 
Pain Scale 1: Numeric (0 - 10) (04/09/19 1245) Pain Intensity 1: 5 (04/09/19 1245) Managed. Neurological Status:  
Neuro (WDL): Within Defined Limits (04/09/19 1245) At baseline. Mental Status and Level of Consciousness: Arousable. Pulmonary Status:  
O2 Device: Nasal cannula (04/09/19 1316) Adequate oxygenation and airway patent. Complications related to anesthesia: None Post-anesthesia assessment completed. No concerns. Signed By: Ozella Koyanagi, MD  
 4/9/2019 Post anesthesia nausea and vomiting:  controlled Vitals Value Taken Time /65 4/9/2019 12:09 PM  
Temp 36.9 °C (98.5 °F) 4/9/2019 12:09 PM  
Pulse 129 4/9/2019 12:09 PM  
Resp 24 4/9/2019 12:09 PM  
SpO2 95 % 4/9/2019 12:09 PM  
Vitals shown include unvalidated device data.

## 2019-04-09 NOTE — PERIOP NOTES
Handoff Report from Operating Room to PACU    Report received from NAATLIA Boles RN and Doris Leung CRNA regarding Lizbet Castillo. Surgeon(s):  Abdirashid Miller MD  And Procedure(s) (LRB):  LEFT TOTAL KNEE ARTHROPLASTY (Left)  confirmed   with allergies, drains and dressings discussed. Anesthesia type, drugs, patient history, complications, estimated blood loss, vital signs, intake and output, and last pain medication, lines, reversal medications and temperature were reviewed.

## 2019-04-09 NOTE — PROGRESS NOTES
PHYSICAL THERAPY KNEE EVALUATION  Patient: Jae Jackson (28 y.o. 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 Currently, patient is functioning below his baseline, requiring CGA for all mobility with RW. Patient with decreased knee flexion AROM. Patient hypotensive throughout mobility and mildly symptomatic with dizziness while ambulating. Patient left resting in bed at the conclusion of PT evaluation with ice applied. Patient will benefit from HHPT with use of RW at discharge. Patient will benefit from skilled intervention to address the above impairments. Patient?s rehabilitation potential is considered to be Good  Factors which may influence rehabilitation potential include:   ? None noted  ? Mental ability/status  ? Medical condition  ? Home/family situation and support systems  ? Safety awareness  ? Pain tolerance/management  ? Other:      PLAN :  Recommendations and Planned Interventions:  ?           Bed Mobility Training             ? Neuromuscular Re-Education  ? Transfer Training                   ? Orthotic/Prosthetic Training  ? Gait Training                         ? Modalities  ? Therapeutic Exercises           ? Edema Management/Control  ? Therapeutic Activities            ? Patient and Family Training/Education  ? Other (comment):    Frequency/Duration: Patient will be followed by physical therapy twice daily to address goals.   Discharge Recommendations: Home Health  Further Equipment Recommendations for Discharge: rolling walker-reports he owns a rollator? SUBJECTIVE:   Patient stated ? I think I might be dizzy now.?    OBJECTIVE DATA SUMMARY:   HISTORY:    Past Medical History:   Diagnosis Date    Adverse effect of anesthesia     Could not seat LMA's x2 sizes due to leak. no trouble with intubation 2/2106    Arrhythmia     h/o A-Fib    Arrhythmia     SSS    Arthritis     Asthma     CAD (coronary artery disease)     h/o stents    Diabetes ([address]     borderline per pt. 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 after 2/2016 shoulder surgery, pt reports that he had to see ENT. pt states he was told that vocal cords were \"bruised\"  pt states at this time he is ~ 50% improved. 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     Prior Level of Function/Home Situation: patient lives with his wife. He is independent with all aspects of functional mobility and ADLS.    Personal factors and/or comorbidities impacting plan of care:     Home Situation  Home Environment: Private residence  # Steps to Enter: 4  Rails to Enter: Yes  Hand Rails : Bilateral(wide)  One/Two Story Residence: One story  Living Alone: No  Support Systems: Spouse/Significant Other/Partner  Patient Expects to be Discharged to[employer] residence  Current DME Used/Available at Home: CPAP, Walker, rollator, Shower chair  Tub or Shower Type: Shower    EXAMINATION/PRESENTATION/DECISION MAKING:   Critical Behavior:  Neurologic State: Alert  Orientation Level: Oriented X4  Cognition: Follows commands     Hearing:     Skin:    Range Of Motion:  AROM: Generally decreased, functional           PROM: Within functional limits           Strength:    Strength: Generally decreased, functional                    Tone & Sensation:   Tone: Normal              Sensation: Intact               Coordination:  Coordination: Within functional limits  Vision:      Functional Mobility:  Bed Mobility:  Rolling: Contact guard assistance  Supine to Sit: Contact guard assistance  Sit to Supine: Contact guard assistance  Scooting: Contact guard assistance  Transfers:  Sit to Stand: Contact guard assistance  Stand to Sit: Contact guard assistance                       Balance:   Sitting: Intact; Without support  Standing: Impaired; With support  Standing - Static: Good  Standing - Dynamic : Good  Ambulation/Gait Training:  Distance (ft): 20 Feet (ft)  Assistive Device: Gait belt;Walker, rolling  Ambulation - Level of Assistance: Contact guard assistance     Gait Description (WDL): Exceptions to WDL  Gait Abnormalities: Decreased step clearance; Antalgic; Step to gait        Base of Support: Widened     Speed/Tram: Pace decreased (<100 feet/min)  Step Length: Right shortened;Left shortened                     Stairs: Therapeutic Exercises:   Knee HEP    Functional Measure:  Barthel Index:    Bathin  Bladder: 10  Bowels: 10  Groomin  Dressin  Feeding: 10  Mobility: 0  Stairs: 0  Toilet Use: 5  Transfer (Bed to Chair and Back): 10  Total: 60/100       Percentage of impairment   0%   1-19%   20-39%   40-59%   60-79%   80-99%   100%   Barthel Score 0-100 100 99-80 79-60 59-40 20-39 1-19   0     The Barthel ADL Index: Guidelines  1.  The index should be used as a record of what a patient does, not as a record of what a patient could do. 2. The main aim is to establish degree of independence from any help, physical or verbal, however minor and for whatever reason. 3. The need for supervision renders the patient not independent. 4. A patient's performance should be established using the best available evidence. Asking the patient, friends/relatives and nurses are the usual sources, but direct observation and common sense are also important. However direct testing is not needed. 5. Usually the patient's performance over the preceding 24-48 hours is important, but occasionally longer periods will be relevant. 6. Middle categories imply that the patient supplies over 50 per cent of the effort. 7. Use of aids to be independent is allowed. Mallory Rose., Barthel, D.W. (9384). Functional evaluation: the Barthel Index. [address] [facility] (14)2. [facility] wade STARR Morris, Azeb Jovani., [facility] [place], [address] [facility] (1999). Measuring the change indisability after inpatient rehabilitation; comparison of the responsiveness of the Barthel Index and Functional [place] Measure. Journal of Neurology, Neurosurgery, and Psychiatry, 66(4), 981-831. Sallie Bautista, N.J.A, ROSHNI Singletary, & Tone Humphrey MKendraA. (2004.) Assessment of post-stroke quality of life in cost-effectiveness studies: The usefulness of the Barthel Index and the EuroQoL-5D.  Quality of Life Research, 15, 187-18          Physical Therapy Evaluation Charge Determination   History Examination Presentation Decision-Making   MEDIUM  Complexity : 1-2 comorbidities / personal factors will impact the outcome/ POC  MEDIUM Complexity : 3 Standardized tests and measures addressing body structure, function, activity limitation and / or participation in recreation  MEDIUM Complexity : Evolving with changing characteristics  Other outcome measures Barthel   MEDIUM      Based on the above components, the patient evaluation is determined to be of the following complexity level: MEDIUM    Pain:  Pain Scale 1: Numeric (0 - 10)  Pain Intensity 1: 5  Pain Location 1: Knee  Pain Orientation 1: Left  Pain Description 1: Sore  Pain Intervention(s) 1: Rest  Activity Tolerance:   Good, hypotensive but stable. VSS on RA. Returned to 2L O2 at end of session. Please refer to the flowsheet for vital signs taken during this treatment. After treatment:   ?         Patient left in no apparent distress sitting up in chair  ? Patient left in no apparent distress in bed  ? Call bell left within reach  ? Nursing notified  ? Caregiver present  ? Bed alarm activated    COMMUNICATION/EDUCATION:   The patient?s plan of care was discussed with: Registered Nurse and . ?         Fall prevention education was provided and the patient/caregiver indicated understanding. ? Patient/family have participated as able in goal setting and plan of care. ?         Patient/family agree to work toward stated goals and plan of care. ?         Patient understands intent and goals of therapy, but is neutral about his/her participation. ? Patient is unable to participate in goal setting and plan of care.     Thank you for this referral.  Sammy Gonzales, PT, DPT   Time Calculation: 21 mins

## 2019-04-09 NOTE — ANESTHESIA PREPROCEDURE EVALUATION
Anesthetic History No history of anesthetic complications Review of Systems / Medical History Patient summary reviewed, nursing notes reviewed and pertinent labs reviewed Pulmonary Sleep apnea: CPAP Asthma Comments: Partial vocal cord paralysis Neuro/Psych Within defined limits Cardiovascular Hypertension Dysrhythmias : atrial fibrillation Pacemaker and CAD Exercise tolerance: >4 METS Comments: Patient last took Plavix on 4/2/19 GI/Hepatic/Renal 
Within defined limits GERD: well controlled Endo/Other Diabetes: type 2 Hypothyroidism Obesity and arthritis Comments: Left Knee OA Other Findings Comments: H/O hemidiaphragm Bilateral partial vocal cord paralysis Questionable allergy to ropivacaine Physical Exam 
 
Airway Mallampati: III 
TM Distance: 4 - 6 cm Neck ROM: normal range of motion Mouth opening: Normal 
 
 Cardiovascular Regular rate and rhythm,  S1 and S2 normal,  no murmur, click, rub, or gallop Dental 
 
Dentition: Full upper dentures and Lower partial plate Pulmonary Breath sounds clear to auscultation Abdominal 
GI exam deferred Other Findings Anesthetic Plan ASA: 3 Anesthesia type: general 
 
Monitoring Plan: BIS Induction: Intravenous Anesthetic plan and risks discussed with: Patient

## 2019-04-09 NOTE — PERIOP NOTES
Dr. Burnetta Goltz notified of uncontrolled afib; orders received. Will continue to monitor. Pt's hearing aids put back in.    1245- Dr. Burnetta Goltz notified of patient's stability. HR afib and stable in 90's.

## 2019-04-09 NOTE — PROGRESS NOTES
Reason for Admission:   LEFT KNEE OSTEOARTHRITIS      RRAT Score: 8 LOW                  Plan for utilizing home health: Per recommendation                       Current Advanced Directive/Advance Care Plan:  None on file     Likelihood of Readmission:  Low per acuity. Pt has a strong support system. No problems financially or accessing medications. Pt has no concerns for discharge at this time. Transition of Care Plan:                    Pt is a 70year old  male, admitted with left knee osteoarthritis. Pt was alert and oriented when meeting with CM, confirming address, emergency contact and PCP. Pt states he lives in a one level home with his wife. There are 5 steps to enter. Pt has a walker (he states there is no seat on it) and drives at baseline. Pt wife will be driving pt at time of discharge and as needed. Pt's preferred pharmacy is the [facility] in [place]. Pt states no problems affording or accessing medications. Pt has no advanced directives and is not interested in arranging any at this time. CM consult noted-   CM met with pt to discuss d/c planning. Pt requesting [facility]. FOC compelted. Referral sent. Waiting on response. Care Management Interventions  PCP Verified by CM:  Yes  Mode of Transport at Discharge: Self  Transition of Care Consult (CM Consult): Discharge Planning  MyChart Signup: No  Discharge Durable Medical Equipment: No  Health Maintenance Reviewed: Yes  Physical Therapy Consult: Yes  Occupational Therapy Consult: No  Speech Therapy Consult: No  Current Support Network: Lives with Spouse, Own Home  Confirm Follow Up Transport: Family  Plan discussed with Pt/Family/Caregiver: Yes  Freedom of Choice Offered: Yes  Discharge Location  Discharge Placement: Home with home health     WILFRIDO Glynn, [address]    [phone]

## 2019-04-09 NOTE — PROGRESS NOTES
Ortho/ NeuroSurgery NP Note    POD# 0  s/p LEFT TOTAL KNEE ARTHROPLASTY     Pt resting in bed. States that he has expected post operative pain, and that he is hungry, otherwise no complaints. VSS Afebrile. Patient has not had something to eat/drink. No nausea. Most Recent Labs:   Lab Results   Component Value Date/Time    HGB 14.2 03/26/2019 10:33 AM    Hemoglobin A1c 6.3 03/26/2019 10:33 AM       Body mass index is 33.42 kg/m². Reference: BMI greater than 30 is classified as obesity and greater than 40 is classified as morbid obesity. STOP BANG Score: 6    Voiding status: needs to void   Dressing c.d.i with cryotherapy in place  HV drain to suction draining serosanguinous fluid     Calves soft and supple; No pain with passive stretch  Sensation and motor intact  SCDs for mechanical DVT proph    Plan:  1) PT BID starting today  2) Irena-op Antibiotics Ancef   3) A-fib (baseline)- Coumadin and Plavix held for now with Lovenox bridge.    4) Lovenox for DVT Prophylaxis (takes Coumadin and Plavix at home)   5) Protonix for GI Prophylaxis    6) Discharge plans to home with wife pending readiness    Readiness for discharge:     [] Vital Signs stable    [x] Hgb stable- no signs or symptoms of hemodynamic instability    [] + Voiding    [x] Incision intact, drainage minimal    [] Tolerating PO intake     [] Cleared by PT (OT if applicable)     [] Stair training completed (if applicable)    [] Independent/Contact Guard ambulation with assistive device (household distance)     [] Bed mobility     [] Car transfers     [] ADLs    [x] Adequate pain control on oral medication alone      Wynona Denver, NP

## 2019-04-09 NOTE — BRIEF OP NOTE
)   BRIEF OPERATIVE NOTE    Date of Procedure: 4/9/2019   Preoperative Diagnosis: LEFT KNEE OSTEO ARTHRITIS  Postoperative Diagnosis: LEFT KNEE OSTEO ARTHRITIS    Procedure(s):  LEFT TOTAL KNEE ARTHROPLASTY  Surgeon(s) and Role:     Jasmina Meredith MD - Primary         Surgical Assistant: 100    Surgical Staff:  Circ-1: Bo Morgan RN  Circ-Relief: Naren Boles RN  Circ-Intern: Ricky Anton RN  Scrub Tech-1: Raffy Rosales  Scrub Tech-Relief: Tretha Rings  Surg Asst-1: Glenard Stack  Event Time In Time Out   Incision Start 1052    Incision Close 1159      Anesthesia: General   Estimated Blood Loss: 0  Specimens:   ID Type Source Tests Collected by Time Destination   1 : Portions of tibia and femur Bone Knee, left  Nahed Kimble MD 4/9/2019 0904 Discarded      Findings: 0   Complications: 0  Implants:   Implant Name Type Inv. Item Serial No.  Lot No. LRB No. Used Action   CEMENT BNE BIOMET HV [id number] --  - SNA  CEMENT BNE BIOMET HV [id number] --  NA [employer] [id number] Left 1 Implanted   CEMENT BNE BIOMET HV [id number] --  - SNA  CEMENT BNE BIOMET HV [id number] --  NA [employer] [id number] Left 1 Implanted   FEM RET CRUC INTLOK LT 72. 5MM -- VANGUARD - SNA  FEM RET CRUC INTLOK LT 72. 5MM -- VANGUARD NA [employer] [id number] Left 1 Implanted   AUG PAT 3-PEG ARCOM SM 37MM -- W/WIRE - SNA  AUG PAT 3-PEG ARCOM SM 37MM -- W/WIRE NA [employer] [id number] Left 1 Implanted   TRAY TIB I-BEAM FIX 79MM COCR --  - SNA  TRAY TIB I-BEAM FIX 79MM COCR --  NA [employer] [id number] Left 1 Implanted   INSERT TIB CR LIP 10X79/83MM -- VANGUARD - SNA  INSERT TIB CR LIP 10X79/83MM -- VANGUARD NA [employer] [id number] Left 1 Implanted

## 2019-04-10 VITALS
WEIGHT: 239.64 LBS | TEMPERATURE: 97.9 F | BODY MASS INDEX: 33.55 KG/M2 | SYSTOLIC BLOOD PRESSURE: 128 MMHG | OXYGEN SATURATION: 97 % | HEIGHT: 71 IN | DIASTOLIC BLOOD PRESSURE: 70 MMHG | RESPIRATION RATE: 18 BRPM | HEART RATE: 79 BPM

## 2019-04-10 LAB
ANION GAP SERPL CALC-SCNC: 5 MMOL/L (ref 5–15)
BUN SERPL-MCNC: 17 MG/DL (ref 6–20)
BUN/CREAT SERPL: 17 (ref 12–20)
CALCIUM SERPL-MCNC: 8.1 MG/DL (ref 8.5–10.1)
CHLORIDE SERPL-SCNC: 111 MMOL/L (ref 97–108)
CO2 SERPL-SCNC: 22 MMOL/L (ref 21–32)
CREAT SERPL-MCNC: 1 MG/DL (ref 0.7–1.3)
GLUCOSE BLD STRIP.AUTO-MCNC: 118 MG/DL (ref 65–100)
GLUCOSE BLD STRIP.AUTO-MCNC: 146 MG/DL (ref 65–100)
GLUCOSE SERPL-MCNC: 135 MG/DL (ref 65–100)
HGB BLD-MCNC: 10.4 G/DL (ref 12.1–17)
INR PPP: 1 (ref 0.9–1.1)
POTASSIUM SERPL-SCNC: 4.3 MMOL/L (ref 3.5–5.1)
PROTHROMBIN TIME: 10.7 SEC (ref 9–11.1)
SERVICE CMNT-IMP: ABNORMAL
SERVICE CMNT-IMP: ABNORMAL
SODIUM SERPL-SCNC: 138 MMOL/L (ref 136–145)

## 2019-04-10 PROCEDURE — 74011250637 HC RX REV CODE- 250/637: Performed by: ORTHOPAEDIC SURGERY

## 2019-04-10 PROCEDURE — 77010033678 HC OXYGEN DAILY

## 2019-04-10 PROCEDURE — 94760 N-INVAS EAR/PLS OXIMETRY 1: CPT

## 2019-04-10 PROCEDURE — 82962 GLUCOSE BLOOD TEST: CPT

## 2019-04-10 PROCEDURE — 36415 COLL VENOUS BLD VENIPUNCTURE: CPT

## 2019-04-10 PROCEDURE — 80048 BASIC METABOLIC PNL TOTAL CA: CPT

## 2019-04-10 PROCEDURE — 97116 GAIT TRAINING THERAPY: CPT

## 2019-04-10 PROCEDURE — 97530 THERAPEUTIC ACTIVITIES: CPT

## 2019-04-10 PROCEDURE — 74011250636 HC RX REV CODE- 250/636: Performed by: ORTHOPAEDIC SURGERY

## 2019-04-10 PROCEDURE — 85018 HEMOGLOBIN: CPT

## 2019-04-10 PROCEDURE — 85610 PROTHROMBIN TIME: CPT

## 2019-04-10 RX ORDER — OXYCODONE HYDROCHLORIDE 5 MG/1
5-10 TABLET ORAL
Qty: 80 TAB | Refills: 0 | Status: SHIPPED | OUTPATIENT
Start: 2019-04-10 | End: 2019-04-24

## 2019-04-10 RX ORDER — POLYETHYLENE GLYCOL 3350 17 G/17G
17 POWDER, FOR SOLUTION ORAL
Qty: 15 PACKET | Refills: 0 | Status: SHIPPED | OUTPATIENT
Start: 2019-04-10 | End: 2019-04-25

## 2019-04-10 RX ORDER — WARFARIN SODIUM 5 MG/1
5 TABLET ORAL ONCE
Status: DISCONTINUED | OUTPATIENT
Start: 2019-04-10 | End: 2019-04-10 | Stop reason: HOSPADM

## 2019-04-10 RX ORDER — AMOXICILLIN 250 MG
1 CAPSULE ORAL DAILY
Qty: 30 TAB | Refills: 0 | Status: SHIPPED | OUTPATIENT
Start: 2019-04-10 | End: 2021-07-02

## 2019-04-10 RX ORDER — WARFARIN SODIUM 5 MG/1
5 TABLET ORAL DAILY
COMMUNITY
End: 2021-07-02 | Stop reason: SDUPTHER

## 2019-04-10 RX ORDER — ACETAMINOPHEN 325 MG/1
650 TABLET ORAL EVERY 6 HOURS
Qty: 112 TAB | Refills: 0 | Status: SHIPPED
Start: 2019-04-10 | End: 2019-04-24

## 2019-04-10 RX ADMIN — DEXAMETHASONE SODIUM PHOSPHATE 10 MG: 4 INJECTION, SOLUTION INTRA-ARTICULAR; INTRALESIONAL; INTRAMUSCULAR; INTRAVENOUS; SOFT TISSUE at 12:33

## 2019-04-10 RX ADMIN — SENNOSIDES, DOCUSATE SODIUM 1 TABLET: 50; 8.6 TABLET, FILM COATED ORAL at 09:52

## 2019-04-10 RX ADMIN — GEMFIBROZIL 600 MG: 600 TABLET ORAL at 09:52

## 2019-04-10 RX ADMIN — CITALOPRAM HYDROBROMIDE 20 MG: 20 TABLET ORAL at 09:52

## 2019-04-10 RX ADMIN — ACETAMINOPHEN 650 MG: 325 TABLET ORAL at 12:33

## 2019-04-10 RX ADMIN — TAMSULOSIN HYDROCHLORIDE 0.4 MG: 0.4 CAPSULE ORAL at 09:52

## 2019-04-10 RX ADMIN — KETOROLAC TROMETHAMINE 15 MG: 30 INJECTION, SOLUTION INTRAMUSCULAR; INTRAVENOUS at 12:33

## 2019-04-10 RX ADMIN — KETOROLAC TROMETHAMINE 15 MG: 30 INJECTION, SOLUTION INTRAMUSCULAR; INTRAVENOUS at 01:15

## 2019-04-10 RX ADMIN — SODIUM CHLORIDE 125 ML/HR: 900 INJECTION, SOLUTION INTRAVENOUS at 06:18

## 2019-04-10 RX ADMIN — ENOXAPARIN SODIUM 40 MG: 40 INJECTION SUBCUTANEOUS at 08:57

## 2019-04-10 RX ADMIN — POLYETHYLENE GLYCOL 3350 17 G: 17 POWDER, FOR SOLUTION ORAL at 09:42

## 2019-04-10 RX ADMIN — ACETAMINOPHEN 650 MG: 325 TABLET ORAL at 06:17

## 2019-04-10 RX ADMIN — Medication 2 G: at 01:15

## 2019-04-10 RX ADMIN — KETOROLAC TROMETHAMINE 15 MG: 30 INJECTION, SOLUTION INTRAMUSCULAR; INTRAVENOUS at 06:18

## 2019-04-10 RX ADMIN — Medication 10 ML: at 06:17

## 2019-04-10 RX ADMIN — ACETAMINOPHEN 650 MG: 325 TABLET ORAL at 01:15

## 2019-04-10 NOTE — PROGRESS NOTES
Problem: Mobility Impaired (Adult and Pediatric)  Goal: *Acute Goals and Plan of Care (Insert Text)  Description  Physical Therapy Goals  Initiated 4/9/2019    1. Patient will move from supine to sit and sit to supine , scoot up and down and roll side to side in bed with modified independence within 4 days. 2. Patient will perform sit to stand with modified independence within 4 days. 3. Patient will ambulate with modified independence for 200 feet with the least restrictive device within 4 days. 4. Patient will ascend/descend 4 stairs with one sided handrail(s) with modified independence within 4 days. 5. Patient will perform home exercise program per protocol with modified independence within 4 days. 6. Patient will demonstrate AROM 0-90 degrees in operative joint within 4 days. Outcome: Resolved/Met   PHYSICAL THERAPY TREATMENT  Patient: Jarad Garvey (84 y.o. male)  Date: 4/10/2019  Diagnosis: DJD (degenerative joint disease) of knee [M17.10]  Osteoarthrosis, localized, primary, knee, left [M17.12] S/P total knee replacement, left  Procedure(s) (LRB):  LEFT TOTAL KNEE ARTHROPLASTY (Left) 1 Day Post-Op  Precautions: WBAT  Chart, physical therapy assessment, plan of care and goals were reviewed. ASSESSMENT:  Pt cleared by nurse to mobilize. Pt received in bed supine. Pt agreeable to therapy. Pt performed supine to sit at supervision. Pt performed sit to stand transfer at [facility]/SBA. Pt ambulated 450ft with RW at [facility]/supervision. Pt moving well with no complaints fo pain. Pt ascended and descended 4 steps with both ralings at [facility]/SBA. Pt performed a car transfer at supervision with cueing for sequencing. Pt moving very well with no safety concerns. Pt left in chair with all needs met. Pt independent with all exercises. From physical therapy stand point pt cleared for discharge. Pt will benefit from HHPT to improve strength and ROM.     Progression toward goals:  ?      Improving appropriately and progressing toward goals  ? Improving slowly and progressing toward goals  ? Not making progress toward goals and plan of care will be adjusted     PLAN:  Patient continues to benefit from skilled intervention to address the above impairments. Continue treatment per established plan of care. Discharge Recommendations:  Home Health  Further Equipment Recommendations for Discharge:  none      SUBJECTIVE:   Patient stated ? I'm feeling pretty good. ?    OBJECTIVE DATA SUMMARY:   Critical Behavior:  Neurologic State: Alert, Appropriate for age  Orientation Level: Oriented X4  Cognition: Appropriate decision making     Range of Motion:         AROM: Generally decreased, Functional                  Functional Mobility Training:  Bed Mobility:  Rolling: Contact guard assistance  Supine to Sit: Supervision     Scooting: Supervision        Transfers:  Sit to Stand: Contact guard assistance  Stand to Sit: Contact guard assistance                             Balance:  Sitting: Intact  Standing: Intact  Standing - Static: Good;Constant support  Standing - Dynamic : Good  Ambulation/Gait Training:  Distance (ft): 450 Feet (ft)  Assistive Device: Gait belt;Walker, rolling  Ambulation - Level of Assistance: Contact guard assistance;Stand-by assistance        Gait Abnormalities: Decreased step clearance        Base of Support: Widened     Speed/Tram: Pace decreased (<100 feet/min)  Step Length: Left shortened;Right shortened        Stairs:  Number of Stairs Trained: 4  Stairs - Level of Assistance: Stand-by assistance  Rail Use: Both  Therapeutic Exercises:     EXERCISE   Sets   Reps   Active Active Assist   Passive Self ROM   Comments   Ankle Pumps 1 10 ?                                        ?                                        ?                                        ?                                           Quad Sets 1 5 ?                                        ?                                        ? ?                                           Hamstring Sets   ? ?                                        ?                                        ?                                           Short Arc Quads   ? ?                                        ?                                        ?                                           Knee Extension Stretch     ? ?                                          ?                                          ?                                           Heel Slides   ? ?                                        ?                                        ?                                           Long Arc Quads 1 10 ?                                        ?                                        ?                                        ?                                           Knee Flexion Stretch 1 5 ?                                        ?                                        ?                                        ?                                           Straight Leg Raises   ? ?                                        ?                                        ?                                             Pain:  Pain Scale 1: Numeric (0 - 10)  Pain Intensity 1: 0              Activity Tolerance:   Pt moving well with no safety concerns. After treatment:   ? Patient left in no apparent distress sitting up in chair  ? Patient left in no apparent distress in bed  ? Call bell left within reach  ? Nursing notified  ? Caregiver present  ?  Bed alarm activated    COMMUNICATION/COLLABORATION:   The patient?s plan of care was discussed with: Registered Nurse    Monse Esquivel, PTA   Time Calculation: 23 mins

## 2019-04-10 NOTE — OP NOTES
[address]  OPERATIVE REPORT    Name:  Adelfo Joe  MR#:  [id number]  :  1940  ACCOUNT #:  [de-identified]  DATE OF SERVICE:  2019    PREOPERATIVE DIAGNOSIS:  Degenerative joint disease, left knee. POSTOPERATIVE DIAGNOSIS:  Degenerative joint disease, left knee. PROCEDURE PERFORMED:  Left total knee replacement. SURGEON:  Ijeoma Martinez MD    ASSISTANT:  None. ANESTHESIA:  General.    COMPLICATIONS:  None. SPECIMENS REMOVED:  None. IMPLANTS:  Yes. ESTIMATED BLOOD LOSS:  100 mL. CLOSURE:  Staples. PROCEDURE:  The patient was given smooth induction of general anesthesia in supine position on the operating table. The left lower extremity was prepped and draped with a thigh tourniquet. An anterior midline incision was made on the medial parapatellar retinacular incision. The patella was everted and resected to restore 28 mm of thickness. Three drill holes were made for the 37 patellar button. The knee was then flexed and a drill hole made in the distal femur for the IM guide micky. A 12 mm of distal femur were resected at 4 degrees of valgus, remaining cuts were made to accommodate a 72.5 Biomet Vanguard cruciate retaining femoral prosthesis in 6 degrees of external rotation. The proximal tibia was resected perpendicular and incised at 79. The tibial stem preparation was completed in the appropriate rotation. The bone surfaces were copiously irrigated with antibiotic solution and pulsatile lavage. The three components were cemented simultaneously with Daryl cement. Trial tibial bearings revealed that a 10 lift would be ideal.  It was placed followed by the locking clip. The knee had excellent range of motion, stability and patellar tracking after a lateral release was performed. The wound was copiously irrigated. Tourniquet deflated and hemostasis obtained. The soft tissues were injected with Marcaine solution.   The fascia was closed with #1 Ethibond and Vicryl over a medium Hemovac drain. The subcutaneous tissues were closed with 2-0 Vicryl and skin with staples. Sterile dressings were applied. The patient was taken to recovery in stable and extubated condition with a correct count and good pulse to his foot.       Tata Lou MD      [id number]  D:  04/09/2019 12:00  T:  04/09/2019 12:01  JOB #:  [id number]

## 2019-04-10 NOTE — DISCHARGE SUMMARY
Ortho Discharge Summary    Patient ID:  Ray Herron  [id number]  male  70 y.o.  1940    Admit date: 4/9/2019    Discharge date: 4/10/2019    Admitting Physician: Giorgio Martinez MD     Consulting Physician(s):   Treatment Team: Attending Provider: Nahed Kimble MD; Nurse Practitioner: Misti Marcelino NP; Nurse Practitioner: Edis Bañuelos NP; Care Manager: Clara Pacheco; Utilization Review: Riley Soni RN    Date of Surgery:   4/9/2019     Preoperative Diagnosis:  LEFT KNEE OSTEO ARTHRITIS    Postoperative Diagnosis:   LEFT KNEE OSTEO ARTHRITIS    Procedure(s):     LEFT TOTAL KNEE ARTHROPLASTY     Anesthesia Type:   General     Surgeon: Nahed Kimble MD                            HPI:  Pt is a 70 y.o. male who has a history of LEFT KNEE OSTEO ARTHRITIS  with pain and limitations of activities of daily living who presents at this time for a left TKA following the failure of conservative management. PMH:   Past Medical History:   Diagnosis Date    Adverse effect of anesthesia     Could not seat LMA's x2 sizes due to leak. no trouble with intubation 2/2106    Arrhythmia     h/o A-Fib    Arrhythmia     SSS    Arthritis     Asthma     CAD (coronary artery disease)     h/o stents    Diabetes ([facility] [address]     borderline per pt. 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 chest    Unspecified sleep apnea     uses CPAP    Vocal cord paralysis     9/9/16 pt reports hx partial vocal cord paralysis in ~2000 d/t virus. after 2/2016 shoulder surgery, pt reports that he had to see ENT. pt states he was told that vocal cords were \"bruised\"  pt states at this time he is ~ 50% improved.        Body mass index is 33.42 kg/m². : A BMI > 30 is classified as obesity and > 40 is classified as morbid obesity. Medications upon admission :   Prior to Admission Medications   Prescriptions Last Dose Informant Patient Reported? Taking? CITALOPRAM HYDROBROMIDE (CITALOPRAM PO) 4/8/2019 at 0800  Yes Yes   Sig: Take 20 mg by mouth daily. acetaminophen (TYLENOL) 325 mg tablet 4/2/2019 at Unknown time  Yes No   Sig: Take 650 mg by mouth every four (4) hours as needed for Pain. atenolol (TENORMIN) 25 mg tablet 4/8/2019 at 0800  Yes Yes   Sig: Take 25 mg by mouth daily. cetirizine (ZYRTEC) 10 mg tablet 4/8/2019 at 0800  Yes Yes   Sig: Take 10 mg by mouth daily as needed. clopidogrel (PLAVIX) 75 mg tab 4/2/2019 at Unknown time  Yes Yes   Sig: Take 75 mg by mouth daily. gemfibrozil (LOPID) 600 mg tablet 4/8/2019 at 1800  Yes Yes   Sig: Take 600 mg by mouth two (2) times a day. losartan (COZAAR) 50 mg tablet 4/8/2019 at 0800  Yes Yes   Sig: Take 50 mg by mouth daily. lovastatin (MEVACOR) 40 mg tablet 4/8/2019 at 1800  Yes Yes   Sig: Take 40 mg by mouth nightly. multivitamin (ONE A DAY) tablet 4/2/2019 at Unknown time  Yes Yes   Sig: Take 1 Tab by mouth daily. pantoprazole (PROTONIX) 40 mg tablet 4/8/2019 at 1800  Yes Yes   Sig: Take 40 mg by mouth every evening. tamsulosin (FLOMAX) 0.4 mg capsule 4/8/2019 at 1800  Yes Yes   Sig: Take 0.4 mg by mouth daily. warfarin (COUMADIN) 4 mg tablet 4/2/2019 at Unknown time  Yes Yes   Sig: Take 5 mg by mouth daily. Facility-Administered Medications: None        Allergies: Allergies   Allergen Reactions    Ace Inhibitors Cough    Bupivacaine Hives, Rash, Itching and Other (comments)     Wheezing        Hospital Course: The patient underwent surgery. Complications:  None; patient tolerated the procedure well. Was taken to the PACU in stable condition and then transferred to the ortho floor.       Perioperative Antibiotics:  Ancef     Postoperative Pain Management: Oxycodone      DVT Prophylaxis: Lovenox while inpatient    Postoperative transfusions:    Number of units banked PRBCs =   none     Post Op complications: none    Hemoglobin at discharge:    Lab Results   Component Value Date/Time    HGB 10.4 (L) 04/10/2019 04:49 AM    INR 1.0 04/10/2019 04:49 AM       Dressing was changed on POD # 1. Incision - clean, dry and intact. No significant erythema or swelling. Neurovascular exam found to be within normal limits. Wound appears to be healing without any evidence of infection. Pt had a HVAC drain that was removed on POD# 1. Physical Therapy started following surgery and participated in bed mobility, transfers and ambulation. Gait:  Gait  Base of Support: Widened  Speed/Tram: Pace decreased (<100 feet/min)  Step Length: Right shortened, Left shortened  Gait Abnormalities: Decreased step clearance, Antalgic, Step to gait  Ambulation - Level of Assistance: Contact guard assistance  Distance (ft): 20 Feet (ft)  Assistive Device: Gait belt, Walker, rolling                   Discharged to: Home with Home Health. Condition on Discharge:   stable    Discharge instructions:  - Anticoagulate with Home dose of Coumadin and Plavix  - Take pain medications as prescribed  - Resume pre hospital diet      - Discharge activity: activity as tolerated  - Ambulate with assistive device as needed. - Weight bearing status WBAT  - Wound Care Keep wound clean and dry. See discharge instruction sheet. -DISCHARGE MEDICATION LIST     Current Discharge Medication List      START taking these medications    Details   oxyCODONE IR (ROXICODONE) 5 mg immediate release tablet Take 1-2 Tabs by mouth every four (4) hours as needed for Pain for up to 14 days. Max Daily Amount: 60 mg. If insurance prior auth./quantity restrictions apply, refer to and look up diagnosis code one prescription. Partial fill as needed is permitted. Please do not contact prescriber.   Qty: 80 Tab, Refills: 0    Associated Diagnoses: S/P total knee replacement, left      polyethylene glycol (MIRALAX) 17 gram packet Take 1 Packet by mouth daily as needed (constipation) for up to 15 days. Qty: 15 Packet, Refills: 0      senna-docusate (PERICOLACE) 8.6-50 mg per tablet Take 1 Tab by mouth daily. Qty: 30 Tab, Refills: 0         CONTINUE these medications which have CHANGED    Details   acetaminophen (TYLENOL) 325 mg tablet Take 2 Tabs by mouth every six (6) hours for 14 days. Qty: 112 Tab, Refills: 0         CONTINUE these medications which have NOT CHANGED    Details   clopidogrel (PLAVIX) 75 mg tab Take 75 mg by mouth daily. warfarin (COUMADIN) 4 mg tablet Take 5 mg by mouth daily. pantoprazole (PROTONIX) 40 mg tablet Take 40 mg by mouth every evening. losartan (COZAAR) 50 mg tablet Take 50 mg by mouth daily. atenolol (TENORMIN) 25 mg tablet Take 25 mg by mouth daily. tamsulosin (FLOMAX) 0.4 mg capsule Take 0.4 mg by mouth daily. lovastatin (MEVACOR) 40 mg tablet Take 40 mg by mouth nightly. Associated Diagnoses: CTS (carpal tunnel syndrome); B12 deficiency; Dysarthria; Dysphagia; Vocal cord paralysis, bilateral partial; Hypothyroid; Cervical spondylolysis; Type II or unspecified type diabetes mellitus with neurological manifestations, not stated as uncontrolled(250.60) [facility] Occlusion and stenosis of carotid artery without mention of cerebral infarction      cetirizine (ZYRTEC) 10 mg tablet Take 10 mg by mouth daily as needed. CITALOPRAM HYDROBROMIDE (CITALOPRAM PO) Take 20 mg by mouth daily. gemfibrozil (LOPID) 600 mg tablet Take 600 mg by mouth two (2) times a day. multivitamin (ONE A DAY) tablet Take 1 Tab by mouth daily. per medical continuation form      -Follow up in office in 2 weeks      Signed:  Rosi Crouch.  Johanna Dash, MARCP-BC, ONP-C  Orthopaedic Nurse Practitioner    4/10/2019  10:10 AM

## 2019-04-10 NOTE — DISCHARGE INSTRUCTIONS
Marck Frias  Surgery: Total Knee Replacement  Surgeon:   Radha Solano MD  Surgery Date:  4/9/2019     To relieve pain:   Use ice/gel packs.  Put the ice pack directly over the wound, or anywhere you are hurting or swollen.  To control pain and swelling, keep ice on regularly, especially after physical activity.  The packs should stay cold for 3-4 hours. When it is not cold anymore, rotate with the packs in the freezer.  Elevate your leg. This will also keep swelling down.  Rest for at least 20 minutes between activity or exercises.  To keep track of your pain medications, write down what you take and when you take it.  The last dose of pain medication you got in the hospital was:       Medication    Dose    Date & Time         Choose your medications based on the pain scale below:     To keep your pain under control, take Tylenol every 6 hours for 14 days - even if you feel like you dont need it.  For mild to moderate pain (4-6 on pain scale), take one pain pill every 3-4 hours as needed.  For severe pain (7-10 on pain scale), take two pain pills every 3-4 hours as needed.  To prevent nausea, take your pain medications with food. Pain Scale                 As your pain lessens:     Slowly start taking less pain medication. You may do this by waiting longer between doses or by taking smaller doses.  Stop using the pain medications as soon as you no longer need it, usually in 2-3 weeks.  Coumadin and Plavix  To prevent blood clots, you will need to restart taking your home dose of Coumadin and Plavix on 4/11/19           To prevent stomach upset or bleeding:   Do not take non-steroidal anti-inflammatory medications (Ibuprofen, Advil, Motrin, Naproxen, etc.)    Take Pepcid 20 mg twice a day, or a similar home medication, while you are taking a blood thinner.             OPSITE (Honeycomb dressing)     Keep your clear, waterproof dressing in place for 5-7 days after your leave the hospital.     If you are still having drainage, you will need to change your dressing in 5-7 days. You will be given one extra dressing to use at home.  If there is no more drainage from the wound, you may leave it open to the air. OPSITE DRESSING INSTRUCTIONS                                                       DO NOTs:   Do not rub your surgical wound   Do not put lotion or oils on your wound.  Do not take a tub bath or go swimming until your doctor says it is ok.  You may shower with this dressing over your wound.  After showering pat the dressing dry.  If you have staples a home health nurse will remove them in about 10 days.  To increase and promote healing:   Stop Smoking (or at least cut back on       Smoking).  Eat a well-balanced diet (high in protein       and vitamin C).  If you have a poor appetite, drink Ensure, Glucerna, or         Mooers Instant Breakfast for the next 30 days.  If you are diabetic, you should control your blood         sugars to prevent infection and help your wound         to heal.                         To prevent constipation, stay active and drink plenty of fluid.  While using pain medications, you should also take stool softeners and laxatives, such as Pericolace       and Miralax.  If you are having too many bowel       Movements, then you may need       to stop taking the laxatives.  You should have a bowel movement 3-4 days        after surgery and then at least every other day while       on pain medication.  To improve your recovery, you must be active!  Use your walker and take short walks (in your home)         about every 2 hours during the day.  Try to increase how far you walk each day.       You can put as much weight on your leg as you can tolerate while walking.  To avoid getting a stiff knee, work on getting your knee bent and straight as soon as possible.  Home health physical therapy will come to your       home the day after you leave the hospital.  The       therapist will visit about 4 times over the next        couple of weeks to teach you how to get out of        bed, to safely walk in your home, and to do your        exercises.  NO DRIVING until your surgeon tells you it is ok.  You can return to work when cleared by a physician.  Please call your physician immediately if you have:     Constant bleeding from your wound.  Increasing redness or swelling around your wound (Some warmth, bruising and swelling is normal).  Change in wound drainage (increase in amount, color, or bad smell).  Change in mental status (unusual behavior   Temperature over 101.5 degrees Fahrenheit      Pain or redness in the calf (back of your lower leg - see picture)     Increased swelling of the thigh, ankle, calf, or foot.  Emergency: CALL 911 if you have:     Shortness of breath     Chest pain when you cough or taking a deep breath     Please call your surgeons office at [phone]  for a follow up appointment. _   You should call as soon as you get home or the next day after discharge. Ask to make an appointment in 2 weeks.  If you have questions or concerns during normal business hours, you may reach Dr. Leonid Steele' Team at [phone].

## 2019-04-10 NOTE — PROGRESS NOTES
Ortho / Neurosurgery NP Note    POD# 1  s/p LEFT TOTAL KNEE ARTHROPLASTY   Pt seen with Dr. Marifer Pichardo this morning    Pt resting in bed. No complaints. VSS Afebrile. 100% on O2 - will wean today  Voiding status: + void    Labs  Lab Results   Component Value Date/Time    HGB 10.4 (L) 04/10/2019 04:49 AM      Lab Results   Component Value Date/Time    INR 1.0 04/10/2019 04:49 AM        Body mass index is 33.42 kg/m². : A BMI > 30 is classified as obesity and > 40 is classified as morbid obesity. Dressing c.d.i  Cryotherapy in place over incision  Drain removed this morning  Calves soft and supple;  No pain with passive stretch  Sensation and motor intact  SCDs for mechanical DVT proph while in bed     PLAN:  1) PT BID, WBAT  2) Lovenox while in hospital, then d/c on home Coumadin for DVT Prophylaxis   3) GI Prophylaxis - Protonix  4) Readniess for discharge:     [x] Vital Signs stable    [x] Hgb stable    [x] + Voiding    [x] Wound intact, drainage minimal    [x] Tolerating PO intake     [] Cleared by PT (OT if applicable)     [] Stair training completed (if applicable)    [] Independent / Contact Guard Assist (household distance)     [] Bed mobility     [] Car transfers     [] ADLs    [x] Adequate pain control on oral medication alone     Wean O2  Home after clears PT    Indiana Ivey NP  DNP, ACNP-BC, ONP-C

## 2019-04-10 NOTE — PROGRESS NOTES
Pharmacy Daily Dosing of Warfarin    Indication: A-Fib and Post-operative Ortho surgery    Goal INR: Keep around 2.0  per Dr. Adithya Mace    PTA Warfarin Dose: 5 mg po daily (recent change to 5 mg tablets per Rx Query)    Concurrent anticoagulants: Enoxaparin 40mg SC daily x2 days; 4/11 & 4/11    Concurrent antiplatelet:  n/a    Major Interacting Medications   Drugs that may increase INR: Fenofibrate (home med)  Drugs that may decrease INR:     Conditions that may increase/decrease INR (CHF, C. diff, cirrhosis, thyroid disorder, hypoalbuminemia):     Labs:  Recent Labs     04/09/19  0844   INR 1.1     INR   Date/Time Value Ref Range Status   04/10/2019 04:49 AM 1.0 0.9 - 1.1   Final     Comment:     A single therapeutic range for Vit K antagonists may not be optimal for all indications - see June, 2008 issue of Chest, American College of Chest Physicians Evidence-Based Clinical Practice Guidelines, 8th Edition. HGB   Date/Time Value Ref Range Status   04/10/2019 04:49 AM 10.4 (L) 12.1 - 17.0 g/dL Final     Comment:     INVESTIGATED PER DELTA CHECK PROTOCOL     PLATELET   Date/Time Value Ref Range Status   03/26/2019 10:33  150 - 400 K/uL Final     Albumin   Date/Time Value Ref Range Status   03/26/2019 10:33 AM 4.0 3.5 - 5.0 g/dL Final         Impression/Plan:   INR 1.1 after Warfarin 7.5mg; previous Last dose 4/12/19   Warfarin 5mg today  Daily INR in place, Daily HGB already ordered     Pharmacy will follow daily and adjust the dose as appropriate. Thanks  Chasity Lim, [facility]    [email or web address]/Warfarin%[email or web address]. pdf

## 2019-04-10 NOTE — PROGRESS NOTES
Discharge instructions reviewed with the patient. The wife at the bedside. Both able to verbalize an understanding. All personal belongings with the patient. The patient left via wheelchair.

## 2019-04-10 NOTE — PHYSICIAN ADVISORY
Short Stay Review       Pt Name:  Alma Ingram   MR#  [id number]   CSN#   [id number]   [address]  [phone]  @ [facility]   Hospitalization date  4/9/2019  7:48 AM  No discharge date for patient encounter. Current Attending Physician  Lizbeth Reynoso MD     A discharge order has been placed for this episode of hospital care for Mr. Alma Ingram; since this hospital stay is less than two midnights, I reviewed Mr. Louise Hughes chart. Mr. Louise Hughes healthcare insurance/benefit include:  Payor: [place] MED[place]ARE / Plan: [place] MEDICARE PART A & B / Product Type: Medicare /     Utilization Review related case summary:   Age  70 y.o.   BMI  Body mass index is 33.42 kg/m². Functional status ASA Class  3   PMHx includes  PMHx :A fib, Cataract; Dermatochalasis of both upper eyelids; GERD (gastroesophageal reflux disease); Hypercholesteremia; and Hypertension.      EKG  n/a   Echo  n/a   Hospital course  Underwent left TKA, runs of afib rvr,    PT OT evaluation     Other Social/logistical issues     Risk of deterioration at the time this patient was hospitalized  High             On the basis of chart review, this patient's hospitalization status      is appropriate for Kalyn Felipe MD

## 2019-04-10 NOTE — PROGRESS NOTES
Orthopedic End of Shift Note    Bedside shift change report given to Donald Allen RN  (oncoming nurse) by Mich Almendarez. IDALIA Serrano (offgoing nurse). Report included the following information SBAR, OR Summary, Procedure Summary, Intake/Output, MAR, Recent Results and Alarm Parameters . POD# 0 left total knee by Dr. Oral Franklin  Significant issues during shift: low blood pressures. One time PRN order for 500 ml bolus given. Dr. Lisha Hawthorne notified with low blood pressure and that the bolus had to be given.     Issues for Physician to address: none at this time    Activity This Shift  (check all that apply) [] chair  [x] dangle   [] bathroom  [] bedside commode [] hallway  [] bedrest   Nausea/Vomiting [] yes [x] no     Voiding Status [] void [] Dillon [] I&O Cath   Bowel Movements [] yes [x] no     Foot Pumps or SCD [x] yes [] no    Ice Pack [x] yes    [] no    Incentive Spirometer [x] yes [] no Volume:   900   Telemetry Monitoring   [] yes [x] no Rhythm:   Supplemental O2 [x] yes [] no Sat off O2:   89%

## 2019-04-10 NOTE — PROGRESS NOTES
[facility] unable to accept pt due to staffing. Choices for [facility] limited due location. Referral sent to [facility], waiting on response. Update- Pt has been accepted with [facility]. AVS updated. Pt and pt wife have no questions or concerns, no further CM needs identified. Care Management Interventions  PCP Verified by CM:  Yes  Mode of Transport at Discharge: Self  Transition of Care Consult (CM Consult): Discharge Planning  MyChart Signup: No  Discharge Durable Medical Equipment: No  Health Maintenance Reviewed: Yes  Physical Therapy Consult: Yes  Occupational Therapy Consult: No  Speech Therapy Consult: No  Current Support Network: Lives with Spouse, Own Home  Confirm Follow Up Transport: Family  Plan discussed with Pt/Family/Caregiver: Yes  Freedom of Choice Offered: Yes  Discharge Location  Discharge Placement: Home with home health     WILFRIDO Hartley, [address]    [phone]

## 2021-07-02 ENCOUNTER — OFFICE VISIT (OUTPATIENT)
Dept: FAMILY MEDICINE CLINIC | Age: 81
End: 2021-07-02
Payer: MEDICARE

## 2021-07-02 VITALS
BODY MASS INDEX: 33.85 KG/M2 | RESPIRATION RATE: 20 BRPM | HEART RATE: 61 BPM | WEIGHT: 241.8 LBS | HEIGHT: 71 IN | OXYGEN SATURATION: 96 % | TEMPERATURE: 98.1 F | DIASTOLIC BLOOD PRESSURE: 72 MMHG | SYSTOLIC BLOOD PRESSURE: 134 MMHG

## 2021-07-02 DIAGNOSIS — R39.12 BENIGN PROSTATIC HYPERPLASIA WITH WEAK URINARY STREAM: ICD-10-CM

## 2021-07-02 DIAGNOSIS — J30.9 CHRONIC ALLERGIC RHINITIS: ICD-10-CM

## 2021-07-02 DIAGNOSIS — Z11.59 ENCOUNTER FOR HEPATITIS C SCREENING TEST FOR LOW RISK PATIENT: ICD-10-CM

## 2021-07-02 DIAGNOSIS — R13.12 OROPHARYNGEAL DYSPHAGIA: ICD-10-CM

## 2021-07-02 DIAGNOSIS — G56.00 CTS (CARPAL TUNNEL SYNDROME): ICD-10-CM

## 2021-07-02 DIAGNOSIS — E78.5 DYSLIPIDEMIA: ICD-10-CM

## 2021-07-02 DIAGNOSIS — Z86.010 HISTORY OF COLON POLYPS: ICD-10-CM

## 2021-07-02 DIAGNOSIS — I10 ESSENTIAL HYPERTENSION: Primary | ICD-10-CM

## 2021-07-02 DIAGNOSIS — Z96.652 S/P TOTAL KNEE REPLACEMENT, LEFT: ICD-10-CM

## 2021-07-02 DIAGNOSIS — E11.49 TYPE II OR UNSPECIFIED TYPE DIABETES MELLITUS WITH NEUROLOGICAL MANIFESTATIONS, NOT STATED AS UNCONTROLLED(250.60) (HCC): ICD-10-CM

## 2021-07-02 DIAGNOSIS — J38.00 VOCAL CORD PARALYSIS: ICD-10-CM

## 2021-07-02 DIAGNOSIS — R13.10 DYSPHAGIA: ICD-10-CM

## 2021-07-02 DIAGNOSIS — E03.9 HYPOTHYROID: ICD-10-CM

## 2021-07-02 DIAGNOSIS — K21.9 GASTROESOPHAGEAL REFLUX DISEASE WITHOUT ESOPHAGITIS: ICD-10-CM

## 2021-07-02 DIAGNOSIS — I48.91 ATRIAL FIBRILLATION, UNSPECIFIED TYPE (HCC): ICD-10-CM

## 2021-07-02 DIAGNOSIS — J38.02 VOCAL CORD PARALYSIS, BILATERAL PARTIAL: ICD-10-CM

## 2021-07-02 DIAGNOSIS — N40.1 BENIGN PROSTATIC HYPERPLASIA WITH WEAK URINARY STREAM: ICD-10-CM

## 2021-07-02 DIAGNOSIS — I73.9 PERIPHERAL ARTERIAL DISEASE (HCC): ICD-10-CM

## 2021-07-02 DIAGNOSIS — I25.10 CORONARY ARTERY DISEASE INVOLVING NATIVE CORONARY ARTERY OF NATIVE HEART WITHOUT ANGINA PECTORIS: ICD-10-CM

## 2021-07-02 DIAGNOSIS — E03.9 ACQUIRED HYPOTHYROIDISM: ICD-10-CM

## 2021-07-02 DIAGNOSIS — F41.1 GAD (GENERALIZED ANXIETY DISORDER): ICD-10-CM

## 2021-07-02 DIAGNOSIS — E53.8 B12 DEFICIENCY: ICD-10-CM

## 2021-07-02 DIAGNOSIS — M43.02 CERVICAL SPONDYLOLYSIS: ICD-10-CM

## 2021-07-02 LAB
INR BLD: 2.1
PT POC: 25.4 SECONDS
VALID INTERNAL CONTROL?: YES

## 2021-07-02 PROCEDURE — 99204 OFFICE O/P NEW MOD 45 MIN: CPT | Performed by: FAMILY MEDICINE

## 2021-07-02 PROCEDURE — G8417 CALC BMI ABV UP PARAM F/U: HCPCS | Performed by: FAMILY MEDICINE

## 2021-07-02 PROCEDURE — 90000 COLLECTION VENOUS BLOOD,VENIPUNCTURE: CPT | Performed by: FAMILY MEDICINE

## 2021-07-02 PROCEDURE — G8427 DOCREV CUR MEDS BY ELIG CLIN: HCPCS | Performed by: FAMILY MEDICINE

## 2021-07-02 PROCEDURE — G8754 DIAS BP LESS 90: HCPCS | Performed by: FAMILY MEDICINE

## 2021-07-02 PROCEDURE — G8536 NO DOC ELDER MAL SCRN: HCPCS | Performed by: FAMILY MEDICINE

## 2021-07-02 PROCEDURE — 85610 PROTHROMBIN TIME: CPT | Performed by: FAMILY MEDICINE

## 2021-07-02 PROCEDURE — 1101F PT FALLS ASSESS-DOCD LE1/YR: CPT | Performed by: FAMILY MEDICINE

## 2021-07-02 PROCEDURE — G8752 SYS BP LESS 140: HCPCS | Performed by: FAMILY MEDICINE

## 2021-07-02 PROCEDURE — G8510 SCR DEP NEG, NO PLAN REQD: HCPCS | Performed by: FAMILY MEDICINE

## 2021-07-02 RX ORDER — WARFARIN SODIUM 5 MG/1
5 TABLET ORAL DAILY
Qty: 90 TABLET | Refills: 0 | Status: SHIPPED | OUTPATIENT
Start: 2021-07-02 | End: 2021-09-16 | Stop reason: SDUPTHER

## 2021-07-02 RX ORDER — ATENOLOL 25 MG/1
25 TABLET ORAL DAILY
Qty: 90 TABLET | Refills: 1 | Status: SHIPPED | OUTPATIENT
Start: 2021-07-02

## 2021-07-02 RX ORDER — CETIRIZINE HCL 10 MG
10 TABLET ORAL
Qty: 90 TABLET | Refills: 1 | Status: SHIPPED | OUTPATIENT
Start: 2021-07-02 | End: 2021-12-20

## 2021-07-02 RX ORDER — LOVASTATIN 40 MG/1
40 TABLET ORAL
Qty: 90 TABLET | Refills: 1 | Status: SHIPPED | OUTPATIENT
Start: 2021-07-02

## 2021-07-02 RX ORDER — LOSARTAN POTASSIUM 50 MG/1
50 TABLET ORAL DAILY
Qty: 90 TABLET | Refills: 1 | Status: SHIPPED | OUTPATIENT
Start: 2021-07-02 | End: 2022-02-22

## 2021-07-02 RX ORDER — PANTOPRAZOLE SODIUM 40 MG/1
40 TABLET, DELAYED RELEASE ORAL EVERY EVENING
Qty: 90 TABLET | Refills: 1 | Status: SHIPPED | OUTPATIENT
Start: 2021-07-02 | End: 2022-02-22

## 2021-07-02 RX ORDER — TAMSULOSIN HYDROCHLORIDE 0.4 MG/1
0.4 CAPSULE ORAL DAILY
Qty: 90 CAPSULE | Refills: 1 | Status: SHIPPED | OUTPATIENT
Start: 2021-07-02 | End: 2022-04-06

## 2021-07-02 RX ORDER — CITALOPRAM 20 MG/1
20 TABLET, FILM COATED ORAL DAILY
Qty: 90 TABLET | Refills: 1 | Status: SHIPPED | OUTPATIENT
Start: 2021-07-02 | End: 2022-04-06

## 2021-07-02 RX ORDER — BISMUTH SUBSALICYLATE 262 MG
1 TABLET,CHEWABLE ORAL DAILY
Qty: 90 TABLET | Refills: 1
Start: 2021-07-02

## 2021-07-02 RX ORDER — GEMFIBROZIL 600 MG/1
600 TABLET, FILM COATED ORAL 2 TIMES DAILY
Qty: 180 TABLET | Refills: 1 | Status: SHIPPED | OUTPATIENT
Start: 2021-07-02 | End: 2022-03-09

## 2021-07-02 NOTE — PROGRESS NOTES
Portillo Nur is a 80 y.o. male who presents with the following:  Chief Complaint   Patient presents with    Hypertension    Heart Problem     CAD and A. fib    Thyroid Problem    Benign Prostatic Hypertrophy    Allergic Rhinitis    Arthritis    Anxiety       Patient comes in as a new patient with hypertension without any chest pain currently or any shortness of breath nor edema. The patient does have heart problems in the form of coronary artery disease and atrial fibrillation the latter is treated with warfarin on a daily basis. Patient does have hypothyroidism which is currently doing well on his current medications. He is not having any fatigue or tiredness nor any cold intolerance. The patient does have BPH which does occasionally give him some urgency and frequency but he is doing well off medication. Patient does have allergic rhinitis and he does generally take Zyrtec for this to hold down his symptoms especially during the pollen seasons. Patient also has generalized anxiety disorder which is managed with citalopram without any difficulty or problems with his anxieties. Allergies   Allergen Reactions    Ace Inhibitors Cough    Bupivacaine Hives, Rash, Itching and Other (comments)     Wheezing       Current Outpatient Medications   Medication Sig    atenoloL (TENORMIN) 25 mg tablet Take 1 Tablet by mouth daily.  cetirizine (ZYRTEC) 10 mg tablet Take 1 Tablet by mouth daily as needed (Allergy symptoms).  gemfibroziL (LOPID) 600 mg tablet Take 1 Tablet by mouth two (2) times a day.  losartan (COZAAR) 50 mg tablet Take 1 Tablet by mouth daily.  lovastatin (MEVACOR) 40 mg tablet Take 1 Tablet by mouth nightly.  multivitamin (ONE A DAY) tablet Take 1 Tablet by mouth daily.  pantoprazole (PROTONIX) 40 mg tablet Take 1 Tablet by mouth every evening.  tamsulosin (FLOMAX) 0.4 mg capsule Take 1 Capsule by mouth daily.     warfarin (COUMADIN) 5 mg tablet Take 1 Tablet by mouth daily.    citalopram (CELEXA) 20 mg tablet Take 1 Tablet by mouth daily. No current facility-administered medications for this visit. Past Medical History:   Diagnosis Date    Adverse effect of anesthesia     Could not seat LMA's x2 sizes due to leak. no trouble with intubation 2/2106    Arrhythmia     h/o A-Fib    Arrhythmia     SSS    Arthritis     Asthma     CAD (coronary artery disease)     h/o stents    Diabetes ([address]     borderline per pt. 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 after 2/2016 shoulder surgery, pt reports that he had to see ENT. pt states he was told that vocal cords were \"bruised\"  pt states at this time he is ~ 50% improved.        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       Family History   Problem Relation Age of Onset    Heart Disease Mother     Heart Disease Father     Cancer Father         prostate    Other Father         mersa sepsis       Social History     Socioeconomic History    Marital status:      Spouse name: Not on file    Number of children: Not on file    Years of education: Not on file    Highest education level: Not on file   Tobacco Use    Smoking status: Former Smoker     Quit date: 1/1/1979     Years since quitting: 42.5    Smokeless tobacco: Never Used   Substance and Sexual Activity    Alcohol use: Yes     Comment: few drinks per year    Drug use: No     Social Determinants of Health     Financial Resource Strain:     Difficulty of Paying Living Expenses:    Food Insecurity:     Worried About Running Out of Food in the Last Year:     [address] in the Last Year:    Transportation Needs:     Lack of Transportation (Medical):  Lack of Transportation (Non-Medical):    Physical Activity:     Days of Exercise per Week:     Minutes of Exercise per Session:    Stress:     Feeling of Stress :    Social Connections:     Frequency of Communication with Friends and Family:     Frequency of Social Gatherings with Friends and Family:     Attends [employer] Services:     Active Member of Clubs or Organizations:     Attends Club or Organization Meetings:     Marital Status:        Review of Systems   Constitutional: Negative for chills, fever, malaise/fatigue and weight loss. HENT: Negative for congestion, hearing loss, sore throat and tinnitus. Eyes: Negative for blurred vision, pain and discharge. Respiratory: Negative for cough, shortness of breath and wheezing. Cardiovascular: Negative for chest pain, palpitations, orthopnea, claudication and leg swelling. Gastrointestinal: Negative for abdominal pain, constipation and heartburn. Genitourinary: Negative for dysuria, frequency and urgency. Musculoskeletal: Negative for falls, joint pain and myalgias. Skin: Negative for itching and rash. Neurological: Negative for dizziness, tingling, tremors and headaches. Endo/Heme/Allergies: Negative for environmental allergies and polydipsia. Psychiatric/Behavioral: Negative for depression and substance abuse. The patient is not nervous/anxious.         Visit Vitals  /72 (BP 1 Location: Left arm)   Pulse 61   Temp 98.1 °F (36.7 °C)   Resp 20   Ht 5' 11\" (1.803 m)   Wt 241 lb 12.8 oz (109.7 kg) SpO2 96%   BMI 33.72 kg/m²     Physical Exam  Vitals reviewed. Constitutional:       Appearance: Normal appearance. HENT:      Head: Normocephalic and atraumatic. Right Ear: Tympanic membrane, ear canal and external ear normal.      Left Ear: Tympanic membrane, ear canal and external ear normal.      Nose: Nose normal. No congestion or rhinorrhea. Mouth/Throat:      Mouth: Mucous membranes are moist.      Pharynx: No oropharyngeal exudate or posterior oropharyngeal erythema. Eyes:      Extraocular Movements: Extraocular movements intact. Conjunctiva/sclera: Conjunctivae normal.      Pupils: Pupils are equal, round, and reactive to light. Comments: Pupil iris and anterior chamber normal.   Neck:      Trachea: No tracheal deviation. Cardiovascular:      Rate and Rhythm: Normal rate and regular rhythm. Pulses: Normal pulses. Heart sounds: Normal heart sounds. No murmur heard. No friction rub. No gallop. Pulmonary:      Effort: Pulmonary effort is normal. No respiratory distress. Breath sounds: Normal breath sounds. No wheezing, rhonchi or rales. Chest:      Chest wall: No tenderness. Abdominal:      General: Bowel sounds are normal. There is no distension. Palpations: Abdomen is soft. There is no mass. Tenderness: There is no abdominal tenderness. There is no guarding or rebound. Hernia: No hernia is present. Musculoskeletal:         General: No tenderness. Normal range of motion. Cervical back: Normal range of motion and neck supple. Right lower leg: No edema. Left lower leg: No edema. Lymphadenopathy:      Cervical: No cervical adenopathy. Skin:     General: Skin is warm and dry. Findings: No erythema or rash. Neurological:      General: No focal deficit present. Mental Status: He is alert and oriented to person, place, and time. Cranial Nerves: No cranial nerve deficit. Motor: No abnormal muscle tone. Deep Tendon Reflexes: Reflexes are normal and symmetric. Reflexes normal.      Comments: Cranial nerves II through XII intact sensory and motor. Deep tendon reflexes in the biceps triceps knee and ankle are normal and bilaterally symmetrical.   Psychiatric:         Mood and Affect: Mood normal.         Behavior: Behavior normal.         Thought Content: Thought content normal.         Judgment: Judgment normal.           ICD-10-CM ICD-9-CM    1. Essential hypertension  I10 401.9 atenoloL (TENORMIN) 25 mg tablet      losartan (COZAAR) 50 mg tablet      multivitamin (ONE A DAY) tablet   2. Coronary artery disease involving native coronary artery of native heart without angina pectoris  I25.10 414.01    3. Atrial fibrillation, unspecified type (HCC)  I48.91 427.31 AMB POC PT/INR      warfarin (COUMADIN) 5 mg tablet   4. Oropharyngeal dysphagia  R13.12 787.22    5. Acquired hypothyroidism  E03.9 244.9 TSH 3RD GENERATION      TSH 3RD GENERATION   6. Vocal cord paralysis, bilateral partial  J38.02 478.33    7. Benign prostatic hyperplasia with weak urinary stream  N40.1 600.01 tamsulosin (FLOMAX) 0.4 mg capsule    R39.12 788.62    8. Type II or unspecified type diabetes mellitus with neurological manifestations, not stated as uncontrolled(250.60) ([facility] [address]  E11.49 250.60 LIPID PANEL      METABOLIC PANEL, COMPREHENSIVE      HEMOGLOBIN A1C WITH EAG      MICROALBUMIN, UR, RAND W/ MICROALB/CREAT RATIO      COLLECTION VENOUS BLOOD,VENIPUNCTURE       DIABETES FOOT EXAM      MICROALBUMIN, UR, RAND W/ MICROALB/CREAT RATIO      HEMOGLOBIN A1C WITH EAG      METABOLIC PANEL, COMPREHENSIVE      LIPID PANEL   9. S/P total knee replacement, left  Z96.652 V43.65    10. B12 deficiency  E53.8 266.2 METHYLMALONIC ACID      METHYLMALONIC ACID   11. Encounter for hepatitis C screening test for low risk patient  Z11.59 V73.89 HEPATITIS C AB      HEPATITIS C AB   12. History of colon polyps  Z86.010 V12.72 REFERRAL TO SURGERY   13.  Chronic allergic rhinitis  J30.9 477.9 cetirizine (ZYRTEC) 10 mg tablet   14. ALEJANDRO (generalized anxiety disorder)  F41.1 300.02 citalopram (CELEXA) 20 mg tablet   15. CTS (carpal tunnel syndrome), bilateral  G56.00 354.0    16. Vocal cord paralysis  J38.00 478.30    17. Dysphagia  R13.10 787.20    18. Hypothyroid  E03.9 244.9    19. Cervical spondylolysis  M43.02 756.19    20. Peripheral arterial disease (HCC)  I73.9 443.9    21. Gastroesophageal reflux disease without esophagitis  K21.9 530.81 pantoprazole (PROTONIX) 40 mg tablet   22.  Dyslipidemia  E78.5 272.4 gemfibroziL (LOPID) 600 mg tablet      lovastatin (MEVACOR) 40 mg tablet       Orders Placed This Encounter    LIPID PANEL     Standing Status:   Future     Number of Occurrences:   1     Standing Expiration Date:   2/5/7184    METABOLIC PANEL, COMPREHENSIVE     Standing Status:   Future     Number of Occurrences:   1     Standing Expiration Date:   8/2/2021    HEMOGLOBIN A1C WITH EAG     Standing Status:   Future     Number of Occurrences:   1     Standing Expiration Date:   8/2/2021    MICROALBUMIN, UR, RAND W/ MICROALB/CREAT RATIO     Standing Status:   Future     Number of Occurrences:   1     Standing Expiration Date:   8/2/2021    HEPATITIS C AB     Standing Status:   Future     Number of Occurrences:   1     Standing Expiration Date:   7/2/2022    METHYLMALONIC ACID     Standing Status:   Future     Number of Occurrences:   1     Standing Expiration Date:   7/2/2022    TSH 3RD GENERATION     Standing Status:   Future     Number of Occurrences:   1     Standing Expiration Date:   7/2/2022    REFERRAL TO SURGERY     Referral Priority:   Routine     Referral Type:   Consultation     Referral Reason:   Specialty Services Required     Referred to Provider:   Shakira Trucios MD     Number of Visits Requested:   1    AMB POC PT/INR    HM DIABETES FOOT EXAM    COLLECTION VENOUS BLOOD,VENIPUNCTURE    atenoloL (TENORMIN) 25 mg tablet     Sig: Take 1 Tablet by mouth daily. Dispense:  90 Tablet     Refill:  1    cetirizine (ZYRTEC) 10 mg tablet     Sig: Take 1 Tablet by mouth daily as needed (Allergy symptoms). Dispense:  90 Tablet     Refill:  1    gemfibroziL (LOPID) 600 mg tablet     Sig: Take 1 Tablet by mouth two (2) times a day. Dispense:  180 Tablet     Refill:  1    losartan (COZAAR) 50 mg tablet     Sig: Take 1 Tablet by mouth daily. Dispense:  90 Tablet     Refill:  1    lovastatin (MEVACOR) 40 mg tablet     Sig: Take 1 Tablet by mouth nightly. Dispense:  90 Tablet     Refill:  1    multivitamin (ONE A DAY) tablet     Sig: Take 1 Tablet by mouth daily. Dispense:  90 Tablet     Refill:  1    pantoprazole (PROTONIX) 40 mg tablet     Sig: Take 1 Tablet by mouth every evening. Dispense:  90 Tablet     Refill:  1    tamsulosin (FLOMAX) 0.4 mg capsule     Sig: Take 1 Capsule by mouth daily. Dispense:  90 Capsule     Refill:  1    warfarin (COUMADIN) 5 mg tablet     Sig: Take 1 Tablet by mouth daily. Dispense:  90 Tablet     Refill:  0    citalopram (CELEXA) 20 mg tablet     Sig: Take 1 Tablet by mouth daily. Dispense:  90 Tablet     Refill:  1       Follow-up and Dispositions    · Return in about 6 months (around 1/2/2022), or if symptoms worsen or fail to improve.          Josias Olivares MD

## 2021-07-02 NOTE — PROGRESS NOTES
1. Have you been to the ER, urgent care clinic since your last visit? Hospitalized since your last visit?no    2. Have you seen or consulted any other health care providers outside of the [address] [place] [address] since your last visit? Include any pap smears or colon screening.  Yes cardiology

## 2021-07-03 LAB
ALBUMIN SERPL-MCNC: 4.2 G/DL (ref 3.5–5)
ALBUMIN/GLOB SERPL: 1.1 {RATIO} (ref 1.1–2.2)
ALP SERPL-CCNC: 107 U/L (ref 45–117)
ALT SERPL-CCNC: 23 U/L (ref 12–78)
ANION GAP SERPL CALC-SCNC: 7 MMOL/L (ref 5–15)
AST SERPL-CCNC: 18 U/L (ref 15–37)
BILIRUB SERPL-MCNC: 0.4 MG/DL (ref 0.2–1)
BUN SERPL-MCNC: 16 MG/DL (ref 6–20)
BUN/CREAT SERPL: 17 (ref 12–20)
CALCIUM SERPL-MCNC: 9.3 MG/DL (ref 8.5–10.1)
CHLORIDE SERPL-SCNC: 108 MMOL/L (ref 97–108)
CHOLEST SERPL-MCNC: 152 MG/DL
CO2 SERPL-SCNC: 24 MMOL/L (ref 21–32)
CREAT SERPL-MCNC: 0.94 MG/DL (ref 0.7–1.3)
CREAT UR-MCNC: 76.5 MG/DL
EST. AVERAGE GLUCOSE BLD GHB EST-MCNC: 137 MG/DL
GLOBULIN SER CALC-MCNC: 3.8 G/DL (ref 2–4)
GLUCOSE SERPL-MCNC: 102 MG/DL (ref 65–100)
HBA1C MFR BLD: 6.4 % (ref 4–5.6)
HCV AB SERPL QL IA: NONREACTIVE
HCV COMMENT,HCGAC: NORMAL
HDLC SERPL-MCNC: 29 MG/DL
HDLC SERPL: 5.2 {RATIO} (ref 0–5)
LDLC SERPL CALC-MCNC: 92.4 MG/DL (ref 0–100)
MICROALBUMIN UR-MCNC: 0.54 MG/DL
MICROALBUMIN/CREAT UR-RTO: 7 MG/G (ref 0–30)
POTASSIUM SERPL-SCNC: 4.5 MMOL/L (ref 3.5–5.1)
PROT SERPL-MCNC: 8 G/DL (ref 6.4–8.2)
SODIUM SERPL-SCNC: 139 MMOL/L (ref 136–145)
TRIGL SERPL-MCNC: 153 MG/DL (ref ?–150)
TSH SERPL DL<=0.05 MIU/L-ACNC: 1.63 UIU/ML (ref 0.36–3.74)
VLDLC SERPL CALC-MCNC: 30.6 MG/DL

## 2021-07-08 LAB
Lab: NORMAL
METHYLMALONATE SERPL-SCNC: 132 NMOL/L (ref 0–378)

## 2021-09-16 ENCOUNTER — OFFICE VISIT (OUTPATIENT)
Dept: FAMILY MEDICINE CLINIC | Age: 81
End: 2021-09-16
Payer: MEDICARE

## 2021-09-16 VITALS
DIASTOLIC BLOOD PRESSURE: 68 MMHG | BODY MASS INDEX: 34.1 KG/M2 | WEIGHT: 243.6 LBS | SYSTOLIC BLOOD PRESSURE: 120 MMHG | OXYGEN SATURATION: 96 % | HEART RATE: 76 BPM | HEIGHT: 71 IN | TEMPERATURE: 98.2 F | RESPIRATION RATE: 17 BRPM

## 2021-09-16 DIAGNOSIS — E03.9 ACQUIRED HYPOTHYROIDISM: ICD-10-CM

## 2021-09-16 DIAGNOSIS — F41.1 GAD (GENERALIZED ANXIETY DISORDER): ICD-10-CM

## 2021-09-16 DIAGNOSIS — E11.49 TYPE II OR UNSPECIFIED TYPE DIABETES MELLITUS WITH NEUROLOGICAL MANIFESTATIONS, NOT STATED AS UNCONTROLLED(250.60) (HCC): ICD-10-CM

## 2021-09-16 DIAGNOSIS — I10 ESSENTIAL HYPERTENSION: ICD-10-CM

## 2021-09-16 DIAGNOSIS — Z96.652 S/P TOTAL KNEE REPLACEMENT, LEFT: ICD-10-CM

## 2021-09-16 DIAGNOSIS — I25.10 CORONARY ARTERY DISEASE INVOLVING NATIVE CORONARY ARTERY OF NATIVE HEART WITHOUT ANGINA PECTORIS: ICD-10-CM

## 2021-09-16 DIAGNOSIS — E78.2 MIXED DYSLIPIDEMIA: ICD-10-CM

## 2021-09-16 DIAGNOSIS — I48.91 ATRIAL FIBRILLATION, UNSPECIFIED TYPE (HCC): Primary | ICD-10-CM

## 2021-09-16 LAB
INR BLD: 2.2
PT POC: 25.7 SECONDS
VALID INTERNAL CONTROL?: YES

## 2021-09-16 PROCEDURE — 85610 PROTHROMBIN TIME: CPT | Performed by: FAMILY MEDICINE

## 2021-09-16 PROCEDURE — 99214 OFFICE O/P EST MOD 30 MIN: CPT | Performed by: FAMILY MEDICINE

## 2021-09-16 RX ORDER — WARFARIN SODIUM 5 MG/1
TABLET ORAL
Qty: 90 TABLET | Refills: 0
Start: 2021-09-16 | End: 2022-02-11 | Stop reason: SDUPTHER

## 2021-09-16 NOTE — PROGRESS NOTES
Francisca Saleh is a 80 y.o. male who presents with the following:  Chief Complaint   Patient presents with    Labs     PT/INR check    Hypertension    Thyroid Problem    Heart Problem     cad    Benign Prostatic Hypertrophy    Diabetes    Anxiety    GERD       Patient comes in today to be cleared for dental surgery and to be instructed on when to stop his warfarin. I told the patient he should stop his warfarin 4 days before the surgery and restart it as soon as the bleeding is stopped. The patient has hypertension which is well controlled on current medicine without chest pain shortness of breath nor edema. The patient does have coronary artery disease but at this time is having no chest pain. Patient does have hypothyroidism but is currently doing well without any supplement. Patient does have BPH and does take tamsulosin which does cut down on his lower urinary tract symptomatology and improves his urine stream.  Patient does have diabetes which is currently diet controlled. Patient does have GERD which is controlled with pantoprazole which prevents heartburn. Patient does have generalized anxiety disorder which is controlled with citalopram which helps keep him functional.  Patient also has hyperlipidemia which is treated with a combination of lovastatin 40 mg nightly and gemfibrozil 600 mg twice a day without any muscle pain or weakness. Allergies   Allergen Reactions    Ace Inhibitors Cough    Bupivacaine Hives, Rash, Itching and Other (comments)     Wheezing       Current Outpatient Medications   Medication Sig    warfarin (COUMADIN) 5 mg tablet Take 1 tablet daily on Monday Wednesday Friday and Sunday and take half a tablet Thursday Tuesday and Saturday    atenoloL (TENORMIN) 25 mg tablet Take 1 Tablet by mouth daily.  cetirizine (ZYRTEC) 10 mg tablet Take 1 Tablet by mouth daily as needed (Allergy symptoms).     gemfibroziL (LOPID) 600 mg tablet Take 1 Tablet by mouth two (2) times a day.  losartan (COZAAR) 50 mg tablet Take 1 Tablet by mouth daily.  lovastatin (MEVACOR) 40 mg tablet Take 1 Tablet by mouth nightly.  multivitamin (ONE A DAY) tablet Take 1 Tablet by mouth daily.  pantoprazole (PROTONIX) 40 mg tablet Take 1 Tablet by mouth every evening.  tamsulosin (FLOMAX) 0.4 mg capsule Take 1 Capsule by mouth daily.  citalopram (CELEXA) 20 mg tablet Take 1 Tablet by mouth daily. No current facility-administered medications for this visit. Past Medical History:   Diagnosis Date    Adverse effect of anesthesia     Could not seat LMA's x2 sizes due to leak. no trouble with intubation 2/2106    Arrhythmia     h/o A-Fib    Arrhythmia     SSS    Arthritis     Asthma     CAD (coronary artery disease)     h/o stents    Diabetes ([facility] [address]     borderline per pt. 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 after 2/2016 shoulder surgery, pt reports that he had to see ENT. pt states he was told that vocal cords were \"bruised\"  pt states at this time he is ~ 50% improved.        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       Family History   Problem Relation Age of Onset    Heart Disease Mother     Heart Disease Father     Cancer Father         prostate  Other Father         mersa sepsis       Social History     Socioeconomic History    Marital status:      Spouse name: Not on file    Number of children: Not on file    Years of education: Not on file    Highest education level: Not on file   Tobacco Use    Smoking status: Former Smoker     Quit date: 1979     Years since quittin.7    Smokeless tobacco: Never Used   Substance and Sexual Activity    Alcohol use: Yes     Comment: few drinks per year    Drug use: No     Social Determinants of Health     Financial Resource Strain:     Difficulty of Paying Living Expenses:    Food Insecurity:     Worried About Running Out of Food in the Last Year:     [address] in the Last Year:    Transportation Needs:     Lack of Transportation (Medical):  Lack of Transportation (Non-Medical):    Physical Activity:     Days of Exercise per Week:     Minutes of Exercise per Session:    Stress:     Feeling of Stress :    Social Connections:     Frequency of Communication with Friends and Family:     Frequency of Social Gatherings with Friends and Family:     Attends [employer] Services:     Active Member of Clubs or Organizations:     Attends Club or Organization Meetings:     Marital Status:        Review of Systems   Constitutional: Negative for chills, fever, malaise/fatigue and weight loss. HENT: Negative for congestion, hearing loss, sore throat and tinnitus. Eyes: Negative for blurred vision, pain and discharge. Respiratory: Negative for cough, shortness of breath and wheezing. Cardiovascular: Negative for chest pain, palpitations, orthopnea, claudication and leg swelling. Gastrointestinal: Negative for abdominal pain, constipation and heartburn. Genitourinary: Negative for dysuria, frequency and urgency. Musculoskeletal: Negative for falls, joint pain and myalgias. Skin: Negative for itching and rash.    Neurological: Negative for dizziness, tingling, tremors and headaches. Endo/Heme/Allergies: Negative for environmental allergies and polydipsia. Psychiatric/Behavioral: Negative for depression and substance abuse. The patient is not nervous/anxious. Visit Vitals  /68 (BP 1 Location: Right arm, BP Patient Position: Sitting, BP Cuff Size: Adult)   Pulse 76   Temp 98.2 °F (36.8 °C) (Temporal)   Resp 17   Ht 5' 11\" (1.803 m)   Wt 243 lb 9.6 oz (110.5 kg)   SpO2 96%   BMI 33.98 kg/m²     Physical Exam  Vitals reviewed. Constitutional:       General: He is not in acute distress. Appearance: Normal appearance. He is obese. He is not ill-appearing. HENT:      Head: Normocephalic and atraumatic. Right Ear: Tympanic membrane, ear canal and external ear normal.      Left Ear: Tympanic membrane, ear canal and external ear normal.      Nose: Nose normal. No congestion or rhinorrhea. Mouth/Throat:      Mouth: Mucous membranes are moist.      Pharynx: No oropharyngeal exudate or posterior oropharyngeal erythema. Comments: Patient's lower incisors are diseased at the gumline and the plan is to have these extracted and allow the gums to heal and then put a plate in. Eyes:      Extraocular Movements: Extraocular movements intact. Conjunctiva/sclera: Conjunctivae normal.      Pupils: Pupils are equal, round, and reactive to light. Comments: Pupil iris and anterior chamber normal.   Neck:      Trachea: No tracheal deviation. Cardiovascular:      Rate and Rhythm: Normal rate and regular rhythm. Pulses: Normal pulses. Heart sounds: Normal heart sounds. No murmur heard. No friction rub. No gallop. Pulmonary:      Effort: Pulmonary effort is normal. No respiratory distress. Breath sounds: Normal breath sounds. No wheezing, rhonchi or rales. Chest:      Chest wall: No tenderness. Abdominal:      General: Bowel sounds are normal. There is no distension. Palpations: Abdomen is soft. There is no mass.       Tenderness: There is no abdominal tenderness. There is no guarding or rebound. Hernia: No hernia is present. Musculoskeletal:         General: No tenderness. Normal range of motion. Cervical back: Normal range of motion and neck supple. Right lower leg: No edema. Left lower leg: No edema. Lymphadenopathy:      Cervical: No cervical adenopathy. Skin:     General: Skin is warm and dry. Findings: No erythema or rash. Neurological:      General: No focal deficit present. Mental Status: He is alert and oriented to person, place, and time. Cranial Nerves: No cranial nerve deficit. Motor: No abnormal muscle tone. Deep Tendon Reflexes: Reflexes are normal and symmetric. Reflexes normal.      Comments: Cranial nerves II through XII intact sensory and motor. Deep tendon reflexes in the biceps triceps knee and ankle are normal and bilaterally symmetrical.   Psychiatric:         Mood and Affect: Mood normal.         Behavior: Behavior normal.         Thought Content: Thought content normal.         Judgment: Judgment normal.           ICD-10-CM ICD-9-CM    1. Atrial fibrillation, unspecified type (HCC)  I48.91 427.31 AMB POC PT/INR      COLLECTION CAPILLARY BLOOD SPECIMEN      warfarin (COUMADIN) 5 mg tablet   2. Essential hypertension  I10 401.9    3. Coronary artery disease involving native coronary artery of native heart without angina pectoris  I25.10 414.01    4. Acquired hypothyroidism  E03.9 244.9    5. Type II or unspecified type diabetes mellitus with neurological manifestations, not stated as uncontrolled(250.60) ([facility]  E11.49 250.60    6. S/P total knee replacement, left  Z96.652 V43.65    7. ALEJANDRO (generalized anxiety disorder)  F41.1 300.02    8.  Mixed dyslipidemia  E78.2 272.2        Orders Placed This Encounter    COLLECTION CAPILLARY BLOOD SPECIMEN    AMB POC PT/INR    warfarin (COUMADIN) 5 mg tablet     Sig: Take 1 tablet daily on Monday Wednesday Friday and Sunday and take half a tablet Thursday Tuesday and Saturday     Dispense:  90 Tablet     Refill:  0       Follow-up and Dispositions    · Return in about 6 months (around 3/16/2022), or if symptoms worsen or fail to improve. Continue current medicines as is until 4 days before his dental surgery then he is to stop his warfarin and restart this after bleeding is stopped.     Hector Hobson MD

## 2021-10-01 ENCOUNTER — VIRTUAL VISIT (OUTPATIENT)
Dept: FAMILY MEDICINE CLINIC | Age: 81
End: 2021-10-01
Payer: MEDICARE

## 2021-10-01 DIAGNOSIS — J44.1 COPD EXACERBATION (HCC): Primary | ICD-10-CM

## 2021-10-01 DIAGNOSIS — U07.1 COVID-19: ICD-10-CM

## 2021-10-01 PROCEDURE — G2025 DIS SITE TELE SVCS RHC/FQHC: HCPCS | Performed by: FAMILY MEDICINE

## 2021-10-01 RX ORDER — AMOXICILLIN 500 MG/1
CAPSULE ORAL
COMMUNITY
Start: 2021-09-17 | End: 2022-02-11

## 2021-10-01 RX ORDER — PREDNISONE 20 MG/1
TABLET ORAL
Qty: 30 TABLET | Refills: 0 | Status: SHIPPED | OUTPATIENT
Start: 2021-10-01 | End: 2022-03-09

## 2021-10-01 RX ORDER — IPRATROPIUM BROMIDE 42 UG/1
1 SPRAY, METERED NASAL 4 TIMES DAILY
Qty: 15 ML | Refills: 0 | Status: SHIPPED | OUTPATIENT
Start: 2021-10-01

## 2021-10-01 NOTE — PROGRESS NOTES
Ron Prakash is a 80 y.o. male, evaluated via audio-only technology on 10/1/2021 for Positive For Covid-19 (with at home test as of today), Breathing Problem, Cough, and Nasal Congestion (chest congestion)  . Assessment & Plan:   Diagnoses and all orders for this visit:    1. COPD exacerbation (HCC)  -     predniSONE (DELTASONE) 20 mg tablet; 5 tablets day for days 1 through 4, then 4 tablets on day 5, then 3 tablets on day 6, then 2 tablets on day 7, then 1 tablet on day 8.    2. COVID-19  -     ipratropium (ATROVENT) 42 mcg (0.06 %) nasal spray; 1 [place] by Both Nostrils route four (4) times daily. Indications: runny nose        12  Subjective:   Patient he states he has a history of COPD has tested positive for Covid and has what sounds like an acute nasopharyngitis from that but has triggered often exacerbation of his COPD. The patient is using albuterol nebulizer every 3-4 hours but feels like his breathing is getting worse. I told him I would call him in some prednisone but if his breathing continued to get worse he needed to go to the emergency room. I have asked the nurses to check in on him on Monday and if he has been admitted to the hospital to give some consideration for an infusion of monoclonal antibodies. Prior to Admission medications    Medication Sig Start Date End Date Taking? Authorizing Provider   amoxicillin (AMOXIL) 500 mg capsule TAKE 4 CAPSULES BY MOUTH 1 HOUR BEFORE DENTAL APPOINTMENT THEN 1 CAPSULE EVERY 6 HOURS 9/17/21  Yes Provider, Historical   predniSONE (DELTASONE) 20 mg tablet 5 tablets day for days 1 through 4, then 4 tablets on day 5, then 3 tablets on day 6, then 2 tablets on day 7, then 1 tablet on day 8. 10/1/21  Yes Montez Reeves MD   ipratropium (ATROVENT) 42 mcg (0.06 %) nasal spray 1 [place] by Both Nostrils route four (4) times daily.  Indications: runny nose 10/1/21  Yes Montez Reeves MD   warfarin (COUMADIN) 5 mg tablet Take 1 tablet daily on Monday Wednesday Friday and Sunday and take half a tablet Thursday Tuesday and Saturday 9/16/21  Yes Ahmet Reeves MD   atenoloL (TENORMIN) 25 mg tablet Take 1 Tablet by mouth daily. Patient taking differently: Take 40 mg by mouth daily. 7/2/21  Yes Ahmet Reeves MD   cetirizine (ZYRTEC) 10 mg tablet Take 1 Tablet by mouth daily as needed (Allergy symptoms). 7/2/21  Yes Ahmet Reeves MD   gemfibroziL (LOPID) 600 mg tablet Take 1 Tablet by mouth two (2) times a day. 7/2/21  Yes Ahmet Reeves MD   losartan (COZAAR) 50 mg tablet Take 1 Tablet by mouth daily. 7/2/21  Yes Ahmet Reeves MD   lovastatin (MEVACOR) 40 mg tablet Take 1 Tablet by mouth nightly. 7/2/21  Yes Ahmet Reeves MD   multivitamin (ONE A DAY) tablet Take 1 Tablet by mouth daily. 7/2/21  Yes Ahmet Reeves MD   pantoprazole (PROTONIX) 40 mg tablet Take 1 Tablet by mouth every evening. 7/2/21  Yes Ahmet Reeves MD   tamsulosin [facility]) 0.4 mg capsule Take 1 Capsule by mouth daily. 7/2/21  Yes Ahmet Reeves MD   citalopram (CELEXA) 20 mg tablet Take 1 Tablet by mouth daily.  7/2/21  Yes Ahmet Reeves MD     Patient Active Problem List   Diagnosis Code    Dysarthria     Dysphagia R13.10    Vocal cord paralysis, bilateral partial J38.02    CTS (carpal tunnel syndrome), bilateral G56.00    Hypothyroid E03.9    B12 deficiency E53.8    Cervical spondylolysis M43.02    Type II or unspecified type diabetes mellitus with neurological manifestations, not stated as uncontrolled(250.60) ([facility]) E11.49    Occlusion and stenosis of carotid artery without mention of cerebral infarction I65.29    HTN (hypertension) I10    CAD (coronary artery disease) I25.10    A-fib ([facility]) I48.91    Benign prostatic hyperplasia N40.0    Osteoarthritis of left shoulder M19.012    Rotator cuff tear arthropathy M75.100, M12.819    Rotator cuff arthropathy M12.819    DJD (degenerative joint disease) of knee M17.10    Osteoarthrosis, localized, primary, knee, left M17.12    S/P total knee replacement, 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    Chronic allergic rhinitis J30.9    History of colon polyps Z86.010    ALEJANDRO (generalized anxiety disorder) F41.1    Mixed dyslipidemia E78.2     Patient Active Problem List    Diagnosis Date Noted    Mixed dyslipidemia 09/16/2021    Chronic allergic rhinitis 07/02/2021    History of colon polyps 07/02/2021    ALEJANDRO (generalized anxiety disorder) 07/02/2021    DJD (degenerative joint disease) of knee 04/09/2019    Osteoarthrosis, localized, primary, knee, left 04/09/2019    S/P total knee replacement, left 04/09/2019    Rotator cuff arthropathy 09/15/2016    Osteoarthritis of left shoulder 09/14/2016    Rotator cuff tear arthropathy 09/14/2016    HTN (hypertension) 02/26/2016    CAD (coronary artery disease) 02/26/2016    A-fib ([facility] 02/26/2016    Benign prostatic hyperplasia 02/26/2016    Dysarthria 01/07/2014    Dysphagia 01/07/2014    Vocal cord paralysis, bilateral partial 01/07/2014    CTS (carpal tunnel syndrome), bilateral 01/07/2014    Hypothyroid 01/07/2014    B12 deficiency 01/07/2014    Cervical spondylolysis 01/07/2014    Type II or unspecified type diabetes mellitus with neurological manifestations, not stated as uncontrolled(250.60) ([facility] 01/07/2014    Occlusion and stenosis of carotid artery without mention of cerebral infarction 01/07/2014     Current Outpatient Medications   Medication Sig Dispense Refill    amoxicillin (AMOXIL) 500 mg capsule TAKE 4 CAPSULES BY MOUTH 1 HOUR BEFORE DENTAL APPOINTMENT THEN 1 CAPSULE EVERY 6 HOURS      predniSONE (DELTASONE) 20 mg tablet 5 tablets day for days 1 through 4, then 4 tablets on day 5, then 3 tablets on day 6, then 2 tablets on day 7, then 1 tablet on day 8. 30 Tablet 0    ipratropium (ATROVENT) 42 mcg (0.06 %) nasal spray 1 [place] by Both Nostrils route four (4) times daily.  Indications: runny nose 15 mL 0    warfarin (COUMADIN) 5 mg tablet Take 1 tablet daily on Monday Wednesday Friday and Sunday and take half a tablet Thursday Tuesday and Saturday 90 Tablet 0    atenoloL (TENORMIN) 25 mg tablet Take 1 Tablet by mouth daily. (Patient taking differently: Take 40 mg by mouth daily.) 90 Tablet 1    cetirizine (ZYRTEC) 10 mg tablet Take 1 Tablet by mouth daily as needed (Allergy symptoms). 90 Tablet 1    gemfibroziL (LOPID) 600 mg tablet Take 1 Tablet by mouth two (2) times a day. 180 Tablet 1    losartan (COZAAR) 50 mg tablet Take 1 Tablet by mouth daily. 90 Tablet 1    lovastatin (MEVACOR) 40 mg tablet Take 1 Tablet by mouth nightly. 90 Tablet 1    multivitamin (ONE A DAY) tablet Take 1 Tablet by mouth daily. 90 Tablet 1    pantoprazole (PROTONIX) 40 mg tablet Take 1 Tablet by mouth every evening. 90 Tablet 1    tamsulosin (FLOMAX) 0.4 mg capsule Take 1 Capsule by mouth daily. 90 Capsule 1    citalopram (CELEXA) 20 mg tablet Take 1 Tablet by mouth daily. 90 Tablet 1     Allergies   Allergen Reactions    Ace Inhibitors Cough    Bupivacaine Hives, Rash, Itching and Other (comments)     Wheezing     Past Medical History:   Diagnosis Date    Adverse effect of anesthesia     Could not seat LMA's x2 sizes due to leak. no trouble with intubation 2/2106    Arrhythmia     h/o A-Fib    Arrhythmia     SSS    Arthritis     Asthma     CAD (coronary artery disease)     h/o stents    Diabetes ([facility]     borderline per pt. 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   after 2016 shoulder surgery, pt reports that he had to see ENT. pt states he was told that vocal cords were \"bruised\"  pt states at this time he is ~ 50% improved. 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     Family History   Problem Relation Age of Onset    Heart Disease Mother     Heart Disease Father     Cancer Father         prostate    Other Father         mersa sepsis     Social History     Tobacco Use    Smoking status: Former Smoker     Quit date: 1979     Years since quittin.7    Smokeless tobacco: Never Used   Substance Use Topics    Alcohol use: Yes     Comment: few drinks per year       Review of Systems   Constitutional: Positive for chills, fever and malaise/fatigue. Positive home Covid test.  Patient's wife  about 2 weeks ago from Covid. HENT: Positive for congestion and sore throat. Negative for ear discharge, ear pain, hearing loss, nosebleeds and tinnitus. Eyes: Negative for blurred vision, double vision, photophobia and discharge. Respiratory: Positive for cough, shortness of breath and wheezing. Negative for sputum production and stridor. Cardiovascular: Negative for chest pain, palpitations, orthopnea and PND. Gastrointestinal: Negative for abdominal pain, diarrhea, heartburn, nausea and vomiting. Genitourinary: Negative for dysuria, frequency and urgency. Musculoskeletal: Negative for myalgias and neck pain. Skin: Negative for itching and rash. Neurological: Negative for dizziness, tingling and headaches. Endo/Heme/Allergies: Does not bruise/bleed easily. Psychiatric/Behavioral: Negative for depression. The patient has insomnia. The patient is not nervous/anxious. Patient-Reported Vitals 10/1/2021   Patient-Reported Pulse 82   Patient-Reported Temperature 99.7   Patient-Reported SpO2 94%       Zev Hester, who was evaluated through a patient-initiated, synchronous (real-time) audio only encounter, and/or her healthcare decision maker, is aware that it is a billable service, with coverage as determined by his insurance carrier. He provided verbal consent to proceed: Yes. He has not had a related appointment within my department in the past 7 days or scheduled within the next 24 hours. On this date 10/01/2021 I have spent 15 minutes reviewing previous notes, test results and face to face (virtual) with the patient discussing the diagnosis and importance of compliance with the treatment plan as well as documenting on the day of the visit.     Tori Payne MD

## 2021-10-01 NOTE — PROGRESS NOTES
1. Have you been to the ER, urgent care clinic since your last visit? Hospitalized since your last visit? No    2. Have you seen or consulted any other health care providers outside of the [address] [place] [address] since your last visit? Include any pap smears or colon screening.  No     Chief Complaint   Patient presents with    Positive For Covid-19     with at home test as of today    Breathing Problem    Cough    Nasal Congestion     chest congestion     Patient-Reported Vitals 10/1/2021   Patient-Reported Pulse 82   Patient-Reported Temperature 99.7   Patient-Reported SpO2 94%

## 2021-10-04 ENCOUNTER — VIRTUAL VISIT (OUTPATIENT)
Dept: FAMILY MEDICINE CLINIC | Age: 81
End: 2021-10-04
Payer: MEDICARE

## 2021-10-04 ENCOUNTER — HOSPITAL ENCOUNTER (EMERGENCY)
Age: 81
Discharge: HOME | End: 2021-10-04
Attending: EMERGENCY MEDICINE
Payer: MEDICARE

## 2021-10-04 ENCOUNTER — APPOINTMENT (OUTPATIENT)
Dept: GENERAL RADIOLOGY | Age: 81
End: 2021-10-04
Attending: EMERGENCY MEDICINE
Payer: MEDICARE

## 2021-10-04 VITALS
DIASTOLIC BLOOD PRESSURE: 79 MMHG | WEIGHT: 236 LBS | BODY MASS INDEX: 33.04 KG/M2 | RESPIRATION RATE: 19 BRPM | OXYGEN SATURATION: 96 % | HEIGHT: 71 IN | HEART RATE: 92 BPM | TEMPERATURE: 99 F | SYSTOLIC BLOOD PRESSURE: 148 MMHG

## 2021-10-04 DIAGNOSIS — J44.1 COPD EXACERBATION (HCC): ICD-10-CM

## 2021-10-04 DIAGNOSIS — U07.1 COVID-19: Primary | ICD-10-CM

## 2021-10-04 LAB
FLUAV RNA SPEC QL NAA+PROBE: NOT DETECTED
FLUBV RNA SPEC QL NAA+PROBE: NOT DETECTED
SARS-COV-2, COV2: DETECTED

## 2021-10-04 PROCEDURE — 71045 X-RAY EXAM CHEST 1 VIEW: CPT

## 2021-10-04 PROCEDURE — 87636 SARSCOV2 & INF A&B AMP PRB: CPT

## 2021-10-04 PROCEDURE — 99283 EMERGENCY DEPT VISIT LOW MDM: CPT

## 2021-10-04 PROCEDURE — G2025 DIS SITE TELE SVCS RHC/FQHC: HCPCS | Performed by: NURSE PRACTITIONER

## 2021-10-04 NOTE — PROGRESS NOTES
Butch Tamayo is a 80 y.o. male evaluated via telephone on 10/4/2021. Consent:  He and/or health care decision maker is aware that that he may receive a bill for this telephone service, depending on his insurance coverage, and has provided verbal consent to proceed: Yes    CC: Covid 19    HPI  Mr. Catrachito Lanier is a 71yo male who presents via telephone encounter to follow up for Covid 19 with COPD exacerbation. States symptoms started middle of last week approx 5 days ago. He had a virtual visit with his PCP on 10-1-21 and was prescribed amoxicillin and prednisone. Over the weekend he did not get any better and last night was the worst he has ever felt. He had a temp of 102. 6. He is SOB and having CP, reports oxygen level at home >94%. Pulse 72 this morning. States BP is 211/115, rechecked at 194/109. He is dizzy and nauseas, no vomiting. Some diarrhea. He also has a hx of A-fib and CAD, on Coumadin. PLAN  Covid 19 with COPD exacerbation  Since I could not examine the patient in person I felt the safest thing for him to do is to go to the ER where he can get a CXR, stat labs, EKG, and BP management. Pt verbalized understanding. Marleni Baker he is not sure if he can drive but has family near by. Advised pt to have family member take him or call an amubulance. Called report to ER physiciam Dr Verito Dukes. Documentation:  I communicated with the patient and/or health care decision maker about the plan of care as noted above. I affirm this is a Patient Initiated Episode with an Established Patient who has not had a related appointment within my department in the past 7 days or scheduled within the next 24 hours.     Total Time: minutes: 5-10 minutes    Note: not billable if this call serves to triage the patient into an appointment for the relevant concern      Geovany Cruz NP

## 2021-10-04 NOTE — PROGRESS NOTES
1. Have you been to the ER, urgent care clinic since your last visit? Hospitalized since your last visit? No    2. Have you seen or consulted any other health care providers outside of the [address] [place] [address] since your last visit? Include any pap smears or colon screening.  Yes When: dentist [phone]

## 2021-10-05 NOTE — ED PROVIDER NOTES
EMERGENCY DEPARTMENT HISTORY AND PHYSICAL EXAM          Date: 10/4/2021  Patient Name: Lizzy Paulino    History of Presenting Illness     Chief Complaint   Patient presents with    Hypertension       History Provided By: Patient    HPI: Lizzy Paulino is a 80 y.o. male, pmhx listed below, who presents to the ED c/o body aches, cough, shortness of breath. Patient reports he had at home Covid test that was positive. Reports he had a telehealth visit today with his PCP and was told to come to the emergency department. Patient reports his wife passed away from [address] in August.  Reports he received both Covid vaccines last spring. Reports he is able to walk around the house without feeling near syncopal or short of breath. No treatments for Covid or symptoms prior to arrival.        PCP: Derrell Mckeon MD    There are no other complaints, changes, or physical findings at this time. Past History       Past Medical History:  Past Medical History:   Diagnosis Date    Adverse effect of anesthesia     Could not seat LMA's x2 sizes due to leak. no trouble with intubation 2/2106    Arrhythmia     h/o A-Fib    Arrhythmia     SSS    Arthritis     Asthma     CAD (coronary artery disease)     h/o stents    Diabetes ([facility]     borderline per pt. 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 after 2/2016 shoulder surgery, pt reports that he had to see ENT. pt states he was told that vocal cords were \"bruised\"  pt states at this time he is ~ 50% improved. 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       Family History:  Family History   Problem Relation Age of Onset    Heart Disease Mother     Heart Disease Father     Cancer Father         prostate    Other Father         mersa sepsis       Social History:  Social History     Tobacco Use    Smoking status: Former Smoker     Quit date: 1979     Years since quittin.7    Smokeless tobacco: Never Used   Substance Use Topics    Alcohol use: Yes     Comment: few drinks per year    Drug use: No       Current Outpatient Medications   Medication Sig Dispense Refill    amoxicillin (AMOXIL) 500 mg capsule TAKE 4 CAPSULES BY MOUTH 1 HOUR BEFORE DENTAL APPOINTMENT THEN 1 CAPSULE EVERY 6 HOURS      predniSONE (DELTASONE) 20 mg tablet 5 tablets day for days 1 through 4, then 4 tablets on day 5, then 3 tablets on day 6, then 2 tablets on day 7, then 1 tablet on day 8. 30 Tablet 0    ipratropium (ATROVENT) 42 mcg (0.06 %) nasal spray 1 [place] by Both Nostrils route four (4) times daily. Indications: runny nose 15 mL 0    warfarin (COUMADIN) 5 mg tablet Take 1 tablet daily on  and  and take half a tablet  and Saturday 90 Tablet 0    atenoloL (TENORMIN) 25 mg tablet Take 1 Tablet by mouth daily. (Patient taking differently: Take 40 mg by mouth daily.) 90 Tablet 1    cetirizine (ZYRTEC) 10 mg tablet Take 1 Tablet by mouth daily as needed (Allergy symptoms). 90 Tablet 1    gemfibroziL (LOPID) 600 mg tablet Take 1 Tablet by mouth two (2) times a day. 180 Tablet 1    losartan (COZAAR) 50 mg tablet Take 1 Tablet by mouth daily.  90 Tablet 1    lovastatin (MEVACOR) 40 mg tablet Take 1 Tablet by mouth nightly. 90 Tablet 1    multivitamin (ONE A DAY) tablet Take 1 Tablet by mouth daily. 90 Tablet 1    pantoprazole (PROTONIX) 40 mg tablet Take 1 Tablet by mouth every evening. 90 Tablet 1    tamsulosin (FLOMAX) 0.4 mg capsule Take 1 Capsule by mouth daily. 90 Capsule 1    citalopram (CELEXA) 20 mg tablet Take 1 Tablet by mouth daily. 90 Tablet 1       Allergies: Allergies   Allergen Reactions    Ace Inhibitors Cough    Bupivacaine Hives, Rash, Itching and Other (comments)     Wheezing         Review of Systems   Review of Systems   Constitutional: Positive for chills, fatigue and fever. HENT: Negative for ear pain. Eyes: Negative for pain. Respiratory: Positive for shortness of breath. Cardiovascular: Negative for chest pain. Gastrointestinal: Negative for abdominal pain. Genitourinary: Negative for flank pain. Musculoskeletal: Negative for back pain. Skin: Negative for rash. Neurological: Negative for headaches. Psychiatric/Behavioral: Negative for agitation. Physical Exam     Vital Signs-Reviewed the patient's vital signs. No data found. Physical Exam  Vitals reviewed. HENT:      Head: Normocephalic and atraumatic. Mouth/Throat:      Mouth: Mucous membranes are moist.   Cardiovascular:      Rate and Rhythm: Normal rate and regular rhythm. Pulmonary:      Effort: Pulmonary effort is normal.      Breath sounds: Normal breath sounds. Abdominal:      Palpations: Abdomen is soft. Tenderness: There is no abdominal tenderness. Musculoskeletal:         General: Normal range of motion. Cervical back: Normal range of motion. Skin:     General: Skin is warm and dry. Neurological:      Mental Status: He is alert and oriented to person, place, and time. Psychiatric:         Mood and Affect: Mood normal.         Diagnostic Study Results     Labs -   No results found for this or any previous visit (from the past 12 hour(s)).     Radiologic Studies -   XR CHEST SNGL V   Final Result   No evidence of active lung disease. CT Results  (Last 48 hours)    None        CXR Results  (Last 48 hours)               10/04/21 1211  XR CHEST SNGL V Final result    Impression:  No evidence of active lung disease. Narrative:  Chest single view 2 views dated 10/4/2021       Comparison chest dated 3/26/2019       History is shortness of breath/possible Covid. 2 portable AP upright views of the chest were obtained. The cardiac silhouette   is normal in size. There is no evidence of active lung disease. Specifically, no   areas of lobar consolidation are identified. The cardiac pacer device and leads   are again noted. The left shoulder prosthesis is again noted. Medical Decision Making   I am the first provider for this patient. I reviewed the vital signs, available nursing notes, past medical history, past surgical history, family history and social history. Records Reviewed: Nursing Notes and Old Medical Records    Provider Notes (Medical Decision Making):   MDM: 74-year-old male with COVID-19. Due to at home test, will test today also. We will plan for x-ray to assess progression of disease. Patient appears comfortable in room, not tachypneic, normal O2 sat. Likely will be discharged home following x-ray. Initial assessment performed. The patients presenting problems have been discussed, and they are in agreement with the care plan formulated and outlined with them. I have encouraged them to ask questions as they arise throughout their visit. PROGRESS NOTE:    Reviewed results with patient. At home plan discussed. Discharge note:  Pt re-evaluated and noted to be feeling better, ready for discharge. Updated pt on all final results. Will follow up as instructed. All questions have been answered, pt voiced understanding and agreement with plan.  Specific return precautions provided as well as instructions to return to the ED should sx worsen at any time. Vital signs stable for discharge. Diagnosis     Clinical Impression:   1. COVID-19            Disposition:  Discharged    Discharge Medication List as of 10/4/2021  1:29 PM            Please note, this dictation was completed with [email or web address], the computer voice recognition software. Quite often unanticipated grammatical, syntax, homophones, and other interpretive errors are inadvertently transcribed by the computer software. Please disregard these errors. Please excuse any errors that have escaped final proof reading.

## 2021-12-20 DIAGNOSIS — J30.9 CHRONIC ALLERGIC RHINITIS: ICD-10-CM

## 2021-12-20 RX ORDER — CETIRIZINE HYDROCHLORIDE 10 MG/1
TABLET, FILM COATED ORAL
Qty: 90 TABLET | Refills: 0 | Status: SHIPPED | OUTPATIENT
Start: 2021-12-20 | End: 2022-02-11

## 2022-01-11 ENCOUNTER — TELEPHONE (OUTPATIENT)
Dept: FAMILY MEDICINE CLINIC | Age: 82
End: 2022-01-11

## 2022-01-11 NOTE — TELEPHONE ENCOUNTER
Need order for a hearing test for patient to be sent to [facility].  Diagnosis code H91.93  Please fax to [phone]

## 2022-02-11 ENCOUNTER — OFFICE VISIT (OUTPATIENT)
Dept: FAMILY MEDICINE CLINIC | Age: 82
End: 2022-02-11
Payer: MEDICARE

## 2022-02-11 VITALS
TEMPERATURE: 98.2 F | HEART RATE: 68 BPM | WEIGHT: 236 LBS | BODY MASS INDEX: 33.04 KG/M2 | HEIGHT: 71 IN | DIASTOLIC BLOOD PRESSURE: 85 MMHG | SYSTOLIC BLOOD PRESSURE: 138 MMHG | RESPIRATION RATE: 20 BRPM

## 2022-02-11 DIAGNOSIS — Z51.81 ANTICOAGULATION MANAGEMENT ENCOUNTER: ICD-10-CM

## 2022-02-11 DIAGNOSIS — I48.91 ATRIAL FIBRILLATION, UNSPECIFIED TYPE (HCC): Primary | ICD-10-CM

## 2022-02-11 DIAGNOSIS — Z79.01 ANTICOAGULATION MANAGEMENT ENCOUNTER: ICD-10-CM

## 2022-02-11 LAB
INR BLD: 1.7
PT POC: 20 SECONDS
VALID INTERNAL CONTROL?: YES

## 2022-02-11 PROCEDURE — 99213 OFFICE O/P EST LOW 20 MIN: CPT | Performed by: FAMILY MEDICINE

## 2022-02-11 PROCEDURE — 85610 PROTHROMBIN TIME: CPT | Performed by: FAMILY MEDICINE

## 2022-02-11 RX ORDER — WARFARIN SODIUM 5 MG/1
TABLET ORAL
Qty: 90 TABLET | Refills: 0 | Status: SHIPPED | OUTPATIENT
Start: 2022-02-11 | End: 2022-04-12 | Stop reason: SDUPTHER

## 2022-02-11 NOTE — PROGRESS NOTES
Amandeep Wagner is a 80 y.o. male who presents with the following:  Chief Complaint   Patient presents with    Anticoagulation    Heart Problem     Atrial fibrillation       Patient is doing well on his medication but his INR was only 1.7 today and he states he has been taking it regularly as prescribed. Patient is on 30 mg of prednisone daily. Allergies   Allergen Reactions    Ace Inhibitors Cough    Bupivacaine Hives, Rash, Itching and Other (comments)     Wheezing       Current Outpatient Medications   Medication Sig    warfarin (COUMADIN) 5 mg tablet Take 1 tablet daily on Monday Tuesday Wednesday Friday and Sunday and take half a tablet Thursday and Saturday    predniSONE (DELTASONE) 20 mg tablet 5 tablets day for days 1 through 4, then 4 tablets on day 5, then 3 tablets on day 6, then 2 tablets on day 7, then 1 tablet on day 8. (Patient taking differently: 30 mg daily.)    ipratropium (ATROVENT) 42 mcg (0.06 %) nasal spray 1 [place] by Both Nostrils route four (4) times daily. Indications: runny nose    atenoloL (TENORMIN) 25 mg tablet Take 1 Tablet by mouth daily. (Patient taking differently: Take 40 mg by mouth daily.)    gemfibroziL (LOPID) 600 mg tablet Take 1 Tablet by mouth two (2) times a day.  losartan (COZAAR) 50 mg tablet Take 1 Tablet by mouth daily.  lovastatin (MEVACOR) 40 mg tablet Take 1 Tablet by mouth nightly.  multivitamin (ONE A DAY) tablet Take 1 Tablet by mouth daily.  pantoprazole (PROTONIX) 40 mg tablet Take 1 Tablet by mouth every evening.  tamsulosin (FLOMAX) 0.4 mg capsule Take 1 Capsule by mouth daily.  citalopram (CELEXA) 20 mg tablet Take 1 Tablet by mouth daily. No current facility-administered medications for this visit. Past Medical History:   Diagnosis Date    Adverse effect of anesthesia     Could not seat LMA's x2 sizes due to leak.   no trouble with intubation 2/2106    Arrhythmia     h/o A-Fib    Arrhythmia     SSS    Arthritis     Asthma     CAD (coronary artery disease)     h/o stents    Diabetes ([facility] [address]     borderline per pt. 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 after 2016 shoulder surgery, pt reports that he had to see ENT. pt states he was told that vocal cords were \"bruised\"  pt states at this time he is ~ 50% improved. 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       Family History   Problem Relation Age of Onset    Heart Disease Mother     Heart Disease Father     Cancer Father         prostate    Other Father         mersa sepsis       Social History     Socioeconomic History    Marital status:    Tobacco Use    Smoking status: Former Smoker     Quit date: 1979     Years since quittin.1    Smokeless tobacco: Never Used   Substance and Sexual Activity    Alcohol use: Yes     Comment: few drinks per year    Drug use: No       Review of Systems   Constitutional: Negative for chills, fever, malaise/fatigue and weight loss. HENT: Negative for congestion, hearing loss, sore throat and tinnitus. Eyes: Negative for blurred vision, pain and discharge. Respiratory: Negative for cough, shortness of breath and wheezing.     Cardiovascular: Negative for chest pain, palpitations, orthopnea, claudication and leg swelling. Gastrointestinal: Negative for abdominal pain, constipation and heartburn. Genitourinary: Negative for dysuria, frequency and urgency. Musculoskeletal: Negative for falls, joint pain and myalgias. Skin: Negative for itching and rash. Neurological: Negative for dizziness, tingling, tremors and headaches. Endo/Heme/Allergies: Negative for environmental allergies and polydipsia. Psychiatric/Behavioral: Negative for depression and substance abuse. The patient is nervous/anxious (Over son's arrest). Visit Vitals  /85 (BP 1 Location: Left arm)   Pulse 68   Temp 98.2 °F (36.8 °C)   Resp 20   Ht 5' 11\" (1.803 m)   Wt 236 lb (107 kg)   BMI 32.92 kg/m²     Physical Exam  Vitals reviewed. Constitutional:       Appearance: Normal appearance. HENT:      Head: Normocephalic and atraumatic. Right Ear: Tympanic membrane, ear canal and external ear normal.      Left Ear: Tympanic membrane, ear canal and external ear normal.      Nose: Nose normal. No congestion or rhinorrhea. Mouth/Throat:      Mouth: Mucous membranes are moist.      Pharynx: No oropharyngeal exudate or posterior oropharyngeal erythema. Eyes:      Extraocular Movements: Extraocular movements intact. Conjunctiva/sclera: Conjunctivae normal.      Pupils: Pupils are equal, round, and reactive to light. Comments: Pupil iris and anterior chamber normal.   Neck:      Trachea: No tracheal deviation. Cardiovascular:      Rate and Rhythm: Normal rate and regular rhythm. Pulses: Normal pulses. Heart sounds: Normal heart sounds. No murmur heard. No friction rub. No gallop. Pulmonary:      Effort: Pulmonary effort is normal. No respiratory distress. Breath sounds: Normal breath sounds. No wheezing, rhonchi or rales. Chest:      Chest wall: No tenderness. Abdominal:      General: Bowel sounds are normal. There is no distension. Palpations: Abdomen is soft.  There is no mass. Tenderness: There is no abdominal tenderness. There is no guarding or rebound. Hernia: No hernia is present. Musculoskeletal:         General: No tenderness. Normal range of motion. Cervical back: Normal range of motion and neck supple. Lymphadenopathy:      Cervical: No cervical adenopathy. Skin:     General: Skin is warm and dry. Findings: No erythema or rash. Neurological:      General: No focal deficit present. Mental Status: He is alert and oriented to person, place, and time. Cranial Nerves: No cranial nerve deficit. Motor: No abnormal muscle tone. Deep Tendon Reflexes: Reflexes are normal and symmetric. Reflexes normal.      Comments: Cranial nerves II through XII intact sensory and motor. Deep tendon reflexes in the biceps triceps knee and ankle are normal and bilaterally symmetrical.   Psychiatric:         Mood and Affect: Mood normal.         Behavior: Behavior normal.           ICD-10-CM ICD-9-CM    1. Atrial fibrillation, unspecified type (HCC)  I48.91 427.31 COLLECTION CAPILLARY BLOOD SPECIMEN      AMB POC PT/INR      warfarin (COUMADIN) 5 mg tablet   2. Anticoagulation management encounter  Z51.81 V58.83     Z79.01 V58.61        Orders Placed This Encounter    COLLECTION CAPILLARY BLOOD SPECIMEN    AMB POC PT/INR    warfarin (COUMADIN) 5 mg tablet     Sig: Take 1 tablet daily on Monday Tuesday Wednesday Friday and Sunday and take half a tablet Thursday and Saturday     Dispense:  90 Tablet     Refill:  0       Follow-up and Dispositions    · Return in about 2 weeks (around 2/25/2022).          Pieter Brown MD

## 2022-02-22 DIAGNOSIS — K21.9 GASTROESOPHAGEAL REFLUX DISEASE WITHOUT ESOPHAGITIS: ICD-10-CM

## 2022-02-22 DIAGNOSIS — I10 ESSENTIAL HYPERTENSION: ICD-10-CM

## 2022-02-22 RX ORDER — PANTOPRAZOLE SODIUM 40 MG/1
TABLET, DELAYED RELEASE ORAL
Qty: 90 TABLET | Refills: 0 | Status: SHIPPED | OUTPATIENT
Start: 2022-02-22 | End: 2022-05-31

## 2022-02-22 RX ORDER — LOSARTAN POTASSIUM 50 MG/1
TABLET ORAL
Qty: 90 TABLET | Refills: 0 | Status: SHIPPED | OUTPATIENT
Start: 2022-02-22 | End: 2022-05-31

## 2022-03-09 ENCOUNTER — OFFICE VISIT (OUTPATIENT)
Dept: FAMILY MEDICINE CLINIC | Age: 82
End: 2022-03-09
Payer: MEDICARE

## 2022-03-09 VITALS
BODY MASS INDEX: 34.16 KG/M2 | OXYGEN SATURATION: 97 % | SYSTOLIC BLOOD PRESSURE: 132 MMHG | HEART RATE: 77 BPM | RESPIRATION RATE: 20 BRPM | WEIGHT: 244 LBS | TEMPERATURE: 98.1 F | HEIGHT: 71 IN | DIASTOLIC BLOOD PRESSURE: 80 MMHG

## 2022-03-09 DIAGNOSIS — E11.49 TYPE II OR UNSPECIFIED TYPE DIABETES MELLITUS WITH NEUROLOGICAL MANIFESTATIONS, NOT STATED AS UNCONTROLLED(250.60) (HCC): ICD-10-CM

## 2022-03-09 DIAGNOSIS — I48.91 ATRIAL FIBRILLATION, UNSPECIFIED TYPE (HCC): Primary | ICD-10-CM

## 2022-03-09 DIAGNOSIS — I10 PRIMARY HYPERTENSION: ICD-10-CM

## 2022-03-09 DIAGNOSIS — E78.2 MIXED DYSLIPIDEMIA: ICD-10-CM

## 2022-03-09 DIAGNOSIS — R07.81 RIB PAIN ON RIGHT SIDE: ICD-10-CM

## 2022-03-09 PROCEDURE — 1101F PT FALLS ASSESS-DOCD LE1/YR: CPT | Performed by: FAMILY MEDICINE

## 2022-03-09 PROCEDURE — 99213 OFFICE O/P EST LOW 20 MIN: CPT | Performed by: FAMILY MEDICINE

## 2022-03-09 PROCEDURE — G8754 DIAS BP LESS 90: HCPCS | Performed by: FAMILY MEDICINE

## 2022-03-09 PROCEDURE — G8417 CALC BMI ABV UP PARAM F/U: HCPCS | Performed by: FAMILY MEDICINE

## 2022-03-09 PROCEDURE — G8536 NO DOC ELDER MAL SCRN: HCPCS | Performed by: FAMILY MEDICINE

## 2022-03-09 PROCEDURE — G8510 SCR DEP NEG, NO PLAN REQD: HCPCS | Performed by: FAMILY MEDICINE

## 2022-03-09 PROCEDURE — G8427 DOCREV CUR MEDS BY ELIG CLIN: HCPCS | Performed by: FAMILY MEDICINE

## 2022-03-09 PROCEDURE — G8752 SYS BP LESS 140: HCPCS | Performed by: FAMILY MEDICINE

## 2022-03-09 RX ORDER — GABAPENTIN 600 MG/1
600 TABLET ORAL
Qty: 30 TABLET | Refills: 2 | Status: SHIPPED | OUTPATIENT
Start: 2022-03-09 | End: 2022-06-15

## 2022-03-09 NOTE — PROGRESS NOTES
Amandeep Wagner is a 80 y.o. male who presents with the following:  Chief Complaint   Patient presents with    Anticoagulation    Pain Lower Quad     Right rib    Hypertension       Patient who has hypertension and atrial fibrillation as well as type 2 diabetes mellitus with neurological manifestations and mixed dyslipidemia comes in today for an anticoagulation check and was found to have an INR of 1.9 and I have told the patient to only take the half dose on 1 day a week and the other days just take 5 mg daily and recheck his INR in 3 to 4 weeks. Patient is also having pain on the right side of his rib cage at about the fourth rib in the midaxillary line and states this has been going on for over 6 months and is chest x-ray that was done not not long ago did not show any pathology but the pain does tend to want to keep him awake. Allergies   Allergen Reactions    Ace Inhibitors Cough    Bupivacaine Hives, Rash, Itching and Other (comments)     Wheezing       Current Outpatient Medications   Medication Sig    gabapentin (NEURONTIN) 600 mg tablet Take 1 Tablet by mouth nightly as needed for Pain. Max Daily Amount: 600 mg.  pantoprazole (PROTONIX) 40 mg tablet TAKE 1 TABLET BY MOUTH ONCE DAILY IN THE EVENING    losartan (COZAAR) 50 mg tablet Take 1 tablet by mouth once daily    warfarin (COUMADIN) 5 mg tablet Take 1 tablet daily on Monday Tuesday Wednesday Friday and Sunday and take half a tablet Thursday and Saturday    ipratropium (ATROVENT) 42 mcg (0.06 %) nasal spray 1 [place] by Both Nostrils route four (4) times daily. Indications: runny nose    atenoloL (TENORMIN) 25 mg tablet Take 1 Tablet by mouth daily. (Patient taking differently: Take 40 mg by mouth daily.)    gemfibroziL (LOPID) 600 mg tablet Take 1 Tablet by mouth two (2) times a day.  lovastatin (MEVACOR) 40 mg tablet Take 1 Tablet by mouth nightly.  multivitamin (ONE A DAY) tablet Take 1 Tablet by mouth daily.     tamsulosin (FLOMAX) 0.4 mg capsule Take 1 Capsule by mouth daily.  citalopram (CELEXA) 20 mg tablet Take 1 Tablet by mouth daily. No current facility-administered medications for this visit. Past Medical History:   Diagnosis Date    Adverse effect of anesthesia     Could not seat LMA's x2 sizes due to leak. no trouble with intubation 2106    Arrhythmia     h/o A-Fib    Arrhythmia     SSS    Arthritis     Asthma     CAD (coronary artery disease)     h/o stents    Diabetes ([address]     borderline per pt. 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 after 2016 shoulder surgery, pt reports that he had to see ENT. pt states he was told that vocal cords were \"bruised\"  pt states at this time he is ~ 50% improved. 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       Family History   Problem Relation Age of Onset    Heart Disease Mother     Heart Disease Father     Cancer Father         prostate    Other Father         mersa sepsis       Social History     Socioeconomic History    Marital status:    Tobacco Use    Smoking status: Former Smoker     Quit date: 1979     Years since quittin.2    Smokeless tobacco: Never Used   Substance and Sexual Activity    Alcohol use:  Yes Comment: few drinks per year    Drug use: No       Review of Systems   Constitutional: Negative for chills, fever, malaise/fatigue and weight loss. HENT: Negative for congestion, hearing loss, sore throat and tinnitus. Eyes: Negative for blurred vision, pain and discharge. Respiratory: Negative for cough, shortness of breath and wheezing. Cardiovascular: Positive for chest pain (Right rib cage just under the axilla). Negative for palpitations, orthopnea, claudication and leg swelling. Gastrointestinal: Negative for abdominal pain, constipation and heartburn. Genitourinary: Negative for dysuria, frequency and urgency. Musculoskeletal: Negative for falls, joint pain and myalgias. Skin: Negative for itching and rash. Neurological: Negative for dizziness, tingling, tremors and headaches. Endo/Heme/Allergies: Negative for environmental allergies and polydipsia. Does not bruise/bleed easily. Psychiatric/Behavioral: Negative for depression and substance abuse. The patient is not nervous/anxious. Visit Vitals  /80   Pulse 77   Temp 98.1 °F (36.7 °C)   Resp 20   Ht 5' 11\" (1.803 m)   Wt 244 lb (110.7 kg)   SpO2 97%   BMI 34.03 kg/m²     Physical Exam  Vitals reviewed. Constitutional:       Appearance: Normal appearance. HENT:      Head: Normocephalic and atraumatic. Right Ear: Tympanic membrane, ear canal and external ear normal.      Left Ear: Tympanic membrane, ear canal and external ear normal.      Nose: Nose normal. No congestion or rhinorrhea. Mouth/Throat:      Mouth: Mucous membranes are moist.      Pharynx: No oropharyngeal exudate or posterior oropharyngeal erythema. Eyes:      Extraocular Movements: Extraocular movements intact. Conjunctiva/sclera: Conjunctivae normal.      Pupils: Pupils are equal, round, and reactive to light. Comments: Pupil iris and anterior chamber normal.   Neck:      Trachea: No tracheal deviation.    Cardiovascular:      Rate and Rhythm: Normal rate and regular rhythm. Pulses: Normal pulses. Heart sounds: Normal heart sounds. No murmur heard. No friction rub. No gallop. Pulmonary:      Effort: Pulmonary effort is normal. No respiratory distress. Breath sounds: Normal breath sounds. No wheezing, rhonchi or rales. Chest:      Chest wall: No tenderness. Abdominal:      General: Bowel sounds are normal. There is no distension. Palpations: Abdomen is soft. There is no mass. Tenderness: There is no abdominal tenderness. There is no guarding or rebound. Hernia: No hernia is present. Musculoskeletal:         General: Tenderness (In the fourth rib at the mid axillary line) present. Normal range of motion. Cervical back: Normal range of motion and neck supple. Lymphadenopathy:      Cervical: No cervical adenopathy. Skin:     General: Skin is warm and dry. Findings: No erythema or rash. Neurological:      General: No focal deficit present. Mental Status: He is alert and oriented to person, place, and time. Cranial Nerves: No cranial nerve deficit. Motor: No abnormal muscle tone. Deep Tendon Reflexes: Reflexes are normal and symmetric. Reflexes normal.      Comments: Cranial nerves II through XII intact sensory and motor. Deep tendon reflexes in the biceps triceps knee and ankle are normal and bilaterally symmetrical.   Psychiatric:         Mood and Affect: Mood normal.         Behavior: Behavior normal.         Thought Content: Thought content normal.         Judgment: Judgment normal.           ICD-10-CM ICD-9-CM    1. Atrial fibrillation, unspecified type ([address]  I48.91 427.31    2. Type II or unspecified type diabetes mellitus with neurological manifestations, not stated as uncontrolled(250.60) ([address]  O16.88 308.50 METABOLIC PANEL, BASIC      HEMOGLOBIN A1C WITH EAG      HEMOGLOBIN A1C WITH EAG      METABOLIC PANEL, BASIC   3.  Rib pain on right side  R07.81 786.50 gabapentin (NEURONTIN) 600 mg tablet   4. Primary hypertension  I10 401.9    5. Mixed dyslipidemia  E78.2 272.2        Orders Placed This Encounter    METABOLIC PANEL, BASIC     Standing Status:   Future     Number of Occurrences:   1     Standing Expiration Date:   3/9/2023    HEMOGLOBIN A1C WITH EAG     Standing Status:   Future     Number of Occurrences:   1     Standing Expiration Date:   3/9/2023    gabapentin (NEURONTIN) 600 mg tablet     Sig: Take 1 Tablet by mouth nightly as needed for Pain. Max Daily Amount: 600 mg. Dispense:  30 Tablet     Refill:  2   I told the patient he might want to try breaking the 600 mg tablet in half for the first dose to make sure it did not make him groggy the next morning.         Karin Rodriguez MD

## 2022-03-10 LAB
ANION GAP SERPL CALC-SCNC: 5 MMOL/L (ref 5–15)
BUN SERPL-MCNC: 19 MG/DL (ref 6–20)
BUN/CREAT SERPL: 22 (ref 12–20)
CALCIUM SERPL-MCNC: 9.6 MG/DL (ref 8.5–10.1)
CHLORIDE SERPL-SCNC: 106 MMOL/L (ref 97–108)
CO2 SERPL-SCNC: 25 MMOL/L (ref 21–32)
CREAT SERPL-MCNC: 0.86 MG/DL (ref 0.7–1.3)
EST. AVERAGE GLUCOSE BLD GHB EST-MCNC: 146 MG/DL
GLUCOSE SERPL-MCNC: 109 MG/DL (ref 65–100)
HBA1C MFR BLD: 6.7 % (ref 4–5.6)
POTASSIUM SERPL-SCNC: 4.5 MMOL/L (ref 3.5–5.1)
SODIUM SERPL-SCNC: 136 MMOL/L (ref 136–145)

## 2022-03-14 ENCOUNTER — TELEPHONE (OUTPATIENT)
Dept: FAMILY MEDICINE CLINIC | Age: 82
End: 2022-03-14

## 2022-03-14 ENCOUNTER — NURSE TRIAGE (OUTPATIENT)
Dept: OTHER | Facility: CLINIC | Age: 82
End: 2022-03-14

## 2022-03-14 NOTE — TELEPHONE ENCOUNTER
Received call from Doyle at [facility] with [employer]. Subjective: Caller states \"I've had vertigo I the past that really took me out of it. This time it's not as severe, but it will just not go away. It's been happening since Friday. Dramamine seems to quite it down some, but it comes back. It's not like vertigo I've had before, it's a milder version, but it's constant. 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 Writer advised patient to have someone drive him. Patient states he can drive himself. Writer reinforced having some else drive him d/t concern for dizziness. Patient states he doesn't have anyone to drive him. Care advice provided, patient verbalizes understanding; denies any other questions or concerns; instructed to call back for any new or worsening symptoms. Writer provided warm transfer to Ann Marie Zarate at [facility] for further care. Attention Provider: Thank you for allowing me to participate in the care of your patient. The patient was connected to triage in response to information provided to the [facility]. Please do not respond through this encounter as the response is not directed to a shared pool.     Reason for Disposition   Lightheadedness (dizziness) present now, after 2 hours of rest and fluids    Protocols used: DIZZINESS-ADULT-[place]

## 2022-03-14 NOTE — TELEPHONE ENCOUNTER
S/w pt he says he is dizzy and unstable it started Friday and he has no other symptoms no chest pain or any new SOB he says his BP is 123/80 so he was offered apt in [facility] but he said he will just go ahead and drive to [place] to his other provider

## 2022-03-19 PROBLEM — J30.9 CHRONIC ALLERGIC RHINITIS: Status: ACTIVE | Noted: 2021-07-02

## 2022-03-19 PROBLEM — Z96.652 S/P TOTAL KNEE REPLACEMENT, LEFT: Status: ACTIVE | Noted: 2019-04-09

## 2022-03-19 PROBLEM — Z86.010 HISTORY OF COLON POLYPS: Status: ACTIVE | Noted: 2021-07-02

## 2022-03-19 PROBLEM — Z86.0100 HISTORY OF COLON POLYPS: Status: ACTIVE | Noted: 2021-07-02

## 2022-03-19 PROBLEM — E78.2 MIXED DYSLIPIDEMIA: Status: ACTIVE | Noted: 2021-09-16

## 2022-03-19 PROBLEM — F41.1 GAD (GENERALIZED ANXIETY DISORDER): Status: ACTIVE | Noted: 2021-07-02

## 2022-03-19 PROBLEM — M17.12 OSTEOARTHROSIS, LOCALIZED, PRIMARY, KNEE, LEFT: Status: ACTIVE | Noted: 2019-04-09

## 2022-03-20 PROBLEM — M17.9 DJD (DEGENERATIVE JOINT DISEASE) OF KNEE: Status: ACTIVE | Noted: 2019-04-09

## 2022-04-06 DIAGNOSIS — R39.12 BENIGN PROSTATIC HYPERPLASIA WITH WEAK URINARY STREAM: ICD-10-CM

## 2022-04-06 DIAGNOSIS — F41.1 GAD (GENERALIZED ANXIETY DISORDER): ICD-10-CM

## 2022-04-06 DIAGNOSIS — N40.1 BENIGN PROSTATIC HYPERPLASIA WITH WEAK URINARY STREAM: ICD-10-CM

## 2022-04-06 RX ORDER — CITALOPRAM 20 MG/1
TABLET, FILM COATED ORAL
Qty: 90 TABLET | Refills: 0 | Status: SHIPPED | OUTPATIENT
Start: 2022-04-06 | End: 2022-07-13

## 2022-04-06 RX ORDER — TAMSULOSIN HYDROCHLORIDE 0.4 MG/1
CAPSULE ORAL
Qty: 90 CAPSULE | Refills: 0 | Status: SHIPPED | OUTPATIENT
Start: 2022-04-06 | End: 2022-07-13

## 2022-04-12 ENCOUNTER — OFFICE VISIT (OUTPATIENT)
Dept: FAMILY MEDICINE CLINIC | Age: 82
End: 2022-04-12
Payer: MEDICARE

## 2022-04-12 VITALS
HEIGHT: 71 IN | OXYGEN SATURATION: 98 % | HEART RATE: 78 BPM | WEIGHT: 246 LBS | TEMPERATURE: 97.3 F | BODY MASS INDEX: 34.44 KG/M2 | RESPIRATION RATE: 20 BRPM

## 2022-04-12 DIAGNOSIS — I48.91 ATRIAL FIBRILLATION, UNSPECIFIED TYPE (HCC): Primary | ICD-10-CM

## 2022-04-12 DIAGNOSIS — Z79.01 CURRENT USE OF LONG TERM ANTICOAGULATION: ICD-10-CM

## 2022-04-12 LAB
INR BLD: 2.4
PT POC: 28.8 SECONDS
VALID INTERNAL CONTROL?: YES

## 2022-04-12 PROCEDURE — 85610 PROTHROMBIN TIME: CPT | Performed by: FAMILY MEDICINE

## 2022-04-12 PROCEDURE — 99213 OFFICE O/P EST LOW 20 MIN: CPT | Performed by: FAMILY MEDICINE

## 2022-04-12 RX ORDER — WARFARIN SODIUM 5 MG/1
TABLET ORAL
Qty: 90 TABLET | Refills: 0
Start: 2022-04-12 | End: 2022-05-26

## 2022-04-12 NOTE — PROGRESS NOTES
Benny Dennis is a [de-identified] y.o. male who presents with the following:  Chief Complaint   Patient presents with    Anticoagulation       Patient continues to do well and is warfarin therapy but has changed it slightly taking a whole tablet on Monday Tuesday Wednesday Thursday Friday and Sunday and half a tablet on Saturday and on this his INR is excellent. The patient is having no problems with unusual bruising or bleeding. Allergies   Allergen Reactions    Ace Inhibitors Cough    Bupivacaine Hives, Rash, Itching and Other (comments)     Wheezing       Current Outpatient Medications   Medication Sig    warfarin (COUMADIN) 5 mg tablet Take 1 tablet daily on Monday Tuesday Wednesday Friday Thursday and Sunday and take half a tablet  Saturday    tamsulosin (FLOMAX) 0.4 mg capsule Take 1 capsule by mouth once daily    citalopram (CELEXA) 20 mg tablet Take 1 tablet by mouth once daily    gabapentin (NEURONTIN) 600 mg tablet Take 1 Tablet by mouth nightly as needed for Pain. Max Daily Amount: 600 mg.    gemfibroziL (LOPID) 600 mg tablet Take 1 tablet by mouth twice daily    pantoprazole (PROTONIX) 40 mg tablet TAKE 1 TABLET BY MOUTH ONCE DAILY IN THE EVENING    losartan (COZAAR) 50 mg tablet Take 1 tablet by mouth once daily    ipratropium (ATROVENT) 42 mcg (0.06 %) nasal spray 1 [place] by Both Nostrils route four (4) times daily. Indications: runny nose    atenoloL (TENORMIN) 25 mg tablet Take 1 Tablet by mouth daily. (Patient taking differently: Take 40 mg by mouth daily.)    lovastatin (MEVACOR) 40 mg tablet Take 1 Tablet by mouth nightly.  multivitamin (ONE A DAY) tablet Take 1 Tablet by mouth daily. No current facility-administered medications for this visit. Past Medical History:   Diagnosis Date    Adverse effect of anesthesia     Could not seat LMA's x2 sizes due to leak.   no trouble with intubation 2/2106    Arrhythmia     h/o A-Fib    Arrhythmia     SSS    Arthritis     Asthma     CAD (coronary artery disease)     h/o stents    Diabetes ([facility] [address]     borderline per pt. 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 after 2016 shoulder surgery, pt reports that he had to see ENT. pt states he was told that vocal cords were \"bruised\"  pt states at this time he is ~ 50% improved. 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       Family History   Problem Relation Age of Onset    Heart Disease Mother     Heart Disease Father     Cancer Father         prostate    Other Father         mersa sepsis       Social History     Socioeconomic History    Marital status:    Tobacco Use    Smoking status: Former Smoker     Quit date: 1979     Years since quittin.3    Smokeless tobacco: Never Used   Substance and Sexual Activity    Alcohol use: Yes     Comment: few drinks per year    Drug use: No       Review of Systems   Constitutional: Negative for chills, fever, malaise/fatigue and weight loss. HENT: Negative for congestion, hearing loss, sore throat and tinnitus. Eyes: Negative for blurred vision, pain and discharge. Respiratory: Negative for cough, shortness of breath and wheezing.     Cardiovascular: Negative for chest pain, palpitations, orthopnea, claudication and leg swelling. Gastrointestinal: Negative for abdominal pain, constipation and heartburn. Genitourinary: Negative for dysuria, frequency and urgency. Musculoskeletal: Negative for falls, joint pain and myalgias. Skin: Negative for itching and rash. Neurological: Negative for dizziness, tingling, tremors and headaches. Endo/Heme/Allergies: Negative for environmental allergies and polydipsia. Psychiatric/Behavioral: Negative for depression and substance abuse. The patient is not nervous/anxious. Visit Vitals  Pulse 78   Temp 97.3 °F (36.3 °C) (Temporal)   Resp 20   Ht 5' 11\" (1.803 m)   Wt 246 lb (111.6 kg)   SpO2 98%   BMI 34.31 kg/m²     Physical Exam  Vitals reviewed. Constitutional:       Appearance: Normal appearance. HENT:      Head: Normocephalic and atraumatic. Right Ear: Tympanic membrane, ear canal and external ear normal.      Left Ear: Tympanic membrane, ear canal and external ear normal.      Nose: Nose normal. No congestion or rhinorrhea. Mouth/Throat:      Mouth: Mucous membranes are moist.      Pharynx: No oropharyngeal exudate or posterior oropharyngeal erythema. Eyes:      Extraocular Movements: Extraocular movements intact. Conjunctiva/sclera: Conjunctivae normal.      Pupils: Pupils are equal, round, and reactive to light. Comments: Pupil iris and anterior chamber normal.   Neck:      Trachea: No tracheal deviation. Cardiovascular:      Rate and Rhythm: Normal rate and regular rhythm. Pulses: Normal pulses. Heart sounds: Normal heart sounds. No murmur heard. No friction rub. No gallop. Pulmonary:      Effort: Pulmonary effort is normal. No respiratory distress. Breath sounds: Normal breath sounds. No wheezing, rhonchi or rales. Chest:      Chest wall: No tenderness. Abdominal:      General: Bowel sounds are normal. There is no distension. Palpations: Abdomen is soft. There is no mass. Tenderness:  There is no abdominal tenderness. There is no guarding or rebound. Hernia: No hernia is present. Musculoskeletal:         General: No tenderness. Normal range of motion. Cervical back: Normal range of motion and neck supple. Lymphadenopathy:      Cervical: No cervical adenopathy. Skin:     General: Skin is warm and dry. Findings: No erythema or rash. Neurological:      General: No focal deficit present. Mental Status: He is alert and oriented to person, place, and time. Cranial Nerves: No cranial nerve deficit. Motor: No abnormal muscle tone. Deep Tendon Reflexes: Reflexes are normal and symmetric. Reflexes normal.      Comments: Cranial nerves II through XII intact sensory and motor. Deep tendon reflexes in the biceps triceps knee and ankle are normal and bilaterally symmetrical.   Psychiatric:         Mood and Affect: Mood normal.         Behavior: Behavior normal.           ICD-10-CM ICD-9-CM    1. Atrial fibrillation, unspecified type (HCC)  I48.91 427.31 COLLECTION CAPILLARY BLOOD SPECIMEN      AMB POC PT/INR      warfarin (COUMADIN) 5 mg tablet   2. Current use of long term anticoagulation  Z79.01 V58.61        Orders Placed This Encounter    COLLECTION CAPILLARY BLOOD SPECIMEN    AMB POC PT/INR    warfarin (COUMADIN) 5 mg tablet     Sig: Take 1 tablet daily on Monday Tuesday Wednesday Friday Thursday and Sunday and take half a tablet  Saturday     Dispense:  90 Tablet     Refill:  0       Follow-up and Dispositions    · Return in about 4 weeks (around 5/10/2022).          Valerie Cuellar MD

## 2022-04-12 NOTE — PROGRESS NOTES
1. \"Have you been to the ER, urgent care clinic since your last visit? Hospitalized since your last visit? \" No    2. \"Have you seen or consulted any other health care providers outside of the [address] [place] [address] since your last visit? \" NO    3. For patients aged 39-70: Has the patient had a colonoscopy / FIT/ Cologuard? NA - based on age      If the patient is female:    4. For patients aged 41-77: Has the patient had a mammogram within the past 2 years? NA - based on age or sex      11. For patients aged 21-65: Has the patient had a pap smear?  NA - based on age or sex    Chief Complaint   Patient presents with    Anticoagulation     Visit Vitals  Pulse 78   Temp 97.3 °F (36.3 °C) (Temporal)   Resp 20   Ht 5' 11\" (1.803 m)   Wt 246 lb (111.6 kg)   SpO2 98%   BMI 34.31 kg/m²     Patient taking coumadin 5 mg daily except Saturday he takes 0.5 tab

## 2022-04-14 RX ORDER — ALBUTEROL SULFATE 90 UG/1
1 AEROSOL, METERED RESPIRATORY (INHALATION)
Qty: 18 G | Refills: 3 | Status: SHIPPED | OUTPATIENT
Start: 2022-04-14

## 2022-04-14 NOTE — TELEPHONE ENCOUNTER
Pt's allergies are bad and would like to get his Albuterol HFA 90MCG refilled at [facility].

## 2022-05-12 ENCOUNTER — OFFICE VISIT (OUTPATIENT)
Dept: FAMILY MEDICINE CLINIC | Age: 82
End: 2022-05-12
Payer: MEDICARE

## 2022-05-12 VITALS
OXYGEN SATURATION: 98 % | TEMPERATURE: 98.4 F | HEART RATE: 69 BPM | SYSTOLIC BLOOD PRESSURE: 130 MMHG | HEIGHT: 71 IN | RESPIRATION RATE: 18 BRPM | DIASTOLIC BLOOD PRESSURE: 80 MMHG | WEIGHT: 243.8 LBS | BODY MASS INDEX: 34.13 KG/M2

## 2022-05-12 DIAGNOSIS — G56.03 BILATERAL CARPAL TUNNEL SYNDROME: ICD-10-CM

## 2022-05-12 DIAGNOSIS — F41.1 GAD (GENERALIZED ANXIETY DISORDER): ICD-10-CM

## 2022-05-12 DIAGNOSIS — E11.49 TYPE II OR UNSPECIFIED TYPE DIABETES MELLITUS WITH NEUROLOGICAL MANIFESTATIONS, NOT STATED AS UNCONTROLLED(250.60) (HCC): ICD-10-CM

## 2022-05-12 DIAGNOSIS — N40.1 BENIGN PROSTATIC HYPERPLASIA WITH WEAK URINARY STREAM: ICD-10-CM

## 2022-05-12 DIAGNOSIS — E78.2 MIXED DYSLIPIDEMIA: ICD-10-CM

## 2022-05-12 DIAGNOSIS — J38.02 VOCAL CORD PARALYSIS, BILATERAL PARTIAL: ICD-10-CM

## 2022-05-12 DIAGNOSIS — Z00.00 MEDICARE ANNUAL WELLNESS VISIT, SUBSEQUENT: Primary | ICD-10-CM

## 2022-05-12 DIAGNOSIS — I25.10 CORONARY ARTERY DISEASE INVOLVING NATIVE CORONARY ARTERY OF NATIVE HEART WITHOUT ANGINA PECTORIS: ICD-10-CM

## 2022-05-12 DIAGNOSIS — R39.12 BENIGN PROSTATIC HYPERPLASIA WITH WEAK URINARY STREAM: ICD-10-CM

## 2022-05-12 DIAGNOSIS — E53.8 B12 DEFICIENCY: ICD-10-CM

## 2022-05-12 DIAGNOSIS — J30.9 CHRONIC ALLERGIC RHINITIS: ICD-10-CM

## 2022-05-12 DIAGNOSIS — J45.40 MODERATE PERSISTENT ASTHMA WITHOUT COMPLICATION: ICD-10-CM

## 2022-05-12 DIAGNOSIS — E03.9 ACQUIRED HYPOTHYROIDISM: ICD-10-CM

## 2022-05-12 DIAGNOSIS — I48.91 ATRIAL FIBRILLATION, UNSPECIFIED TYPE (HCC): ICD-10-CM

## 2022-05-12 DIAGNOSIS — I10 PRIMARY HYPERTENSION: ICD-10-CM

## 2022-05-12 LAB
INR BLD: 3.2
PT POC: 35.9 SECONDS
VALID INTERNAL CONTROL?: YES

## 2022-05-12 PROCEDURE — 36415 COLL VENOUS BLD VENIPUNCTURE: CPT | Performed by: FAMILY MEDICINE

## 2022-05-12 PROCEDURE — 99214 OFFICE O/P EST MOD 30 MIN: CPT | Performed by: FAMILY MEDICINE

## 2022-05-12 PROCEDURE — G0439 PPPS, SUBSEQ VISIT: HCPCS | Performed by: FAMILY MEDICINE

## 2022-05-12 PROCEDURE — 85610 PROTHROMBIN TIME: CPT | Performed by: FAMILY MEDICINE

## 2022-05-12 RX ORDER — PREDNISONE 10 MG/1
10 TABLET ORAL
Qty: 30 TABLET | Refills: 5
Start: 2022-05-12 | End: 2022-07-15

## 2022-05-12 NOTE — PATIENT INSTRUCTIONS
Medicare Wellness Visit, Male    The best way to live healthy is to have a lifestyle where you eat a well-balanced diet, exercise regularly, limit alcohol use, and quit all forms of tobacco/nicotine, if applicable. Regular preventive services are another way to keep healthy. Preventive services (vaccines, screening tests, monitoring & exams) can help personalize your care plan, which helps you manage your own care. Screening tests can find health problems at the earliest stages, when they are easiest to treat. [facility] follows the current, evidence-based guidelines published by the [facility] ([facility]) when recommending preventive services for our patients. Because we follow these guidelines, sometimes recommendations change over time as research supports it. (For example, a prostate screening blood test is no longer routinely recommended for men with no symptoms). Of course, you and your doctor may decide to screen more often for some diseases, based on your risk and co-morbidities (chronic disease you are already diagnosed with). Preventive services for you include:  - Medicare offers their members a free annual wellness visit, which is time for you and your primary care provider to discuss and plan for your preventive service needs. Take advantage of this benefit every year!  -All adults over age 72 should receive the recommended pneumonia vaccines. Current USPSTF guidelines recommend a series of two vaccines for the best pneumonia protection.   -All adults should have a flu vaccine yearly and tetanus vaccine every 10 years.  -All adults age 48 and older should receive the shingles vaccines (series of two vaccines).        -All adults age 38-68 who are overweight should have a diabetes screening test once every three years.   -Other screening tests & preventive services for persons with diabetes include: an eye exam to screen for diabetic retinopathy, a kidney function test, a foot exam, and stricter control over your cholesterol.   -Cardiovascular screening for adults with routine risk involves an electrocardiogram (ECG) at intervals determined by the provider.   -Colorectal cancer screening should be done for adults age 54-65 with no increased risk factors for colorectal cancer. There are a number of acceptable methods of screening for this type of cancer. Each test has its own benefits and drawbacks. Discuss with your provider what is most appropriate for you during your annual wellness visit. The different tests include: colonoscopy (considered the best screening method), a fecal occult blood test, a fecal DNA test, and sigmoidoscopy.  -All adults born between [facility] should be screened once for Hepatitis C.  -An Abdominal Aortic Aneurysm (AAA) Screening is recommended for men age 73-68 who has ever smoked in their lifetime. Here is a list of your current Health Maintenance items (your personalized list of preventive services) with a due date: There are no preventive care reminders to display for this patient.

## 2022-05-12 NOTE — PROGRESS NOTES
Labs drawn from left arm per Dr. Efren Brewer orders. Patient tolerated well. 1. Have you been to the ER, urgent care clinic since your last visit? Hospitalized since your last visit?no    2. Have you seen or consulted any other health care providers outside of the [address] [place] [address] since your last visit? Include any pap smears or colon screening. no  This is the Subsequent Medicare Annual Wellness Exam, performed 12 months or more after the Initial AWV or the last Subsequent AWV    I have reviewed the patient's medical history in detail and updated the computerized patient record. Kinsey Jacobsen is a [de-identified] y.o. 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  Osteoarthritis     Multiple joints    Diabetes     With neurological manifestations and B12 deficiency    Cholesterol Problem     Mixed    Anxiety     ALEJANDRO       Patient's hypertension is doing well with no chest pain shortness of breath nor edema. Patient's atrial fibrillation is being controlled with heart rate as well as being well controlled on his anticoagulation on warfarin. The patient is having no abnormal bruising or bleeding. Patient's thyroid is being controlled with thyroid replacement without any fatigue nor any cold intolerance. The patient's osteoarthritis is doing well with his gabapentin which is controlling his achy pain and some of the stiffness. Patient's diabetes is doing well on his current medication which is basic reduced carbohydrate diet controlled without any polyuria nor abnormal weight loss. The patient's cholesterol is being controlled with lovastatin without any muscle pain or weakness.   The patient's anxiety is being controlled with citalopram such that he is sleeping well without any sleep aids and he feels comfortable during the daytime. The patient's BPH is being controlled with tamsulosin and his asthma is being controlled with albuterol and currently he is on 10 mg of prednisone daily. Allergies   Allergen Reactions    Ace Inhibitors Cough    Bupivacaine Hives, Rash, Itching and Other (comments)     Wheezing       Current Outpatient Medications   Medication Sig    predniSONE (DELTASONE) 10 mg tablet Take 10 mg by mouth daily (with breakfast).  albuterol (PROVENTIL HFA, VENTOLIN HFA, PROAIR HFA) 90 mcg/actuation inhaler Take 1 Puff by inhalation every six (6) hours as needed for Wheezing.  warfarin (COUMADIN) 5 mg tablet Take 1 tablet daily on Monday Tuesday Wednesday Friday Thursday and Sunday and take half a tablet  Saturday    tamsulosin (FLOMAX) 0.4 mg capsule Take 1 capsule by mouth once daily    citalopram (CELEXA) 20 mg tablet Take 1 tablet by mouth once daily    gabapentin (NEURONTIN) 600 mg tablet Take 1 Tablet by mouth nightly as needed for Pain. Max Daily Amount: 600 mg.    gemfibroziL (LOPID) 600 mg tablet Take 1 tablet by mouth twice daily    pantoprazole (PROTONIX) 40 mg tablet TAKE 1 TABLET BY MOUTH ONCE DAILY IN THE EVENING    losartan (COZAAR) 50 mg tablet Take 1 tablet by mouth once daily    ipratropium (ATROVENT) 42 mcg (0.06 %) nasal spray 1 [place] by Both Nostrils route four (4) times daily. Indications: runny nose    atenoloL (TENORMIN) 25 mg tablet Take 1 Tablet by mouth daily. (Patient taking differently: Take 40 mg by mouth daily.)    lovastatin (MEVACOR) 40 mg tablet Take 1 Tablet by mouth nightly.  multivitamin (ONE A DAY) tablet Take 1 Tablet by mouth daily. No current facility-administered medications for this visit. Past Medical History:   Diagnosis Date    Adverse effect of anesthesia     Could not seat LMA's x2 sizes due to leak.   no trouble with intubation 2/2106    Arrhythmia     h/o A-Fib    Arrhythmia     SSS    Arthritis     Asthma     CAD (coronary artery disease)     h/o stents    Diabetes ([facility] [address]     borderline per pt. 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 after 2016 shoulder surgery, pt reports that he had to see ENT. pt states he was told that vocal cords were \"bruised\"  pt states at this time he is ~ 50% improved. 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       Family History   Problem Relation Age of Onset    Heart Disease Mother     Heart Disease Father     Cancer Father         prostate    Other Father         mersa sepsis       Social History     Socioeconomic History    Marital status:    Tobacco Use    Smoking status: Former Smoker     Quit date: 1979     Years since quittin.3    Smokeless tobacco: Never Used   Substance and Sexual Activity    Alcohol use: Yes     Comment: few drinks per year    Drug use: No       Review of Systems   Constitutional: Negative for chills, fever, malaise/fatigue and weight loss. HENT: Positive for congestion. Negative for hearing loss, sore throat and tinnitus. Eyes: Negative for blurred vision, pain and discharge. Respiratory: Positive for cough and wheezing. Negative for shortness of breath.     Cardiovascular: Negative for chest pain, palpitations, orthopnea, claudication and leg swelling. Gastrointestinal: Negative for abdominal pain, constipation and heartburn. Genitourinary: Negative for dysuria, frequency and urgency. Musculoskeletal: Negative for falls, joint pain and myalgias. Skin: Negative for itching and rash. Neurological: Negative for dizziness, tingling, tremors and headaches. Endo/Heme/Allergies: Negative for environmental allergies and polydipsia. Psychiatric/Behavioral: Negative for depression and substance abuse. The patient is not nervous/anxious. Visit Vitals  /80   Pulse 69   Temp 98.4 °F (36.9 °C)   Resp 18   Ht 5' 11\" (1.803 m)   Wt 243 lb 12.8 oz (110.6 kg)   SpO2 98%   BMI 34.00 kg/m²     Physical Exam  Vitals reviewed. Constitutional:       General: He is not in acute distress. Appearance: Normal appearance. He is obese. He is not ill-appearing. HENT:      Head: Normocephalic and atraumatic. Right Ear: Tympanic membrane, ear canal and external ear normal.      Left Ear: Tympanic membrane, ear canal and external ear normal.      Nose: Nose normal. No congestion or rhinorrhea. Mouth/Throat:      Mouth: Mucous membranes are moist.      Pharynx: No oropharyngeal exudate or posterior oropharyngeal erythema. Eyes:      Extraocular Movements: Extraocular movements intact. Conjunctiva/sclera: Conjunctivae normal.      Pupils: Pupils are equal, round, and reactive to light. Comments: Pupil iris and anterior chamber normal.   Neck:      Trachea: No tracheal deviation. Cardiovascular:      Rate and Rhythm: Normal rate and regular rhythm. Pulses: Normal pulses. Heart sounds: Normal heart sounds. No murmur heard. No friction rub. No gallop. Pulmonary:      Effort: Pulmonary effort is normal. No respiratory distress. Breath sounds: Normal breath sounds. No wheezing, rhonchi or rales. Chest:      Chest wall: No tenderness.    Abdominal: General: Bowel sounds are normal. There is no distension. Palpations: Abdomen is soft. There is no mass. Tenderness: There is no abdominal tenderness. There is no guarding or rebound. Hernia: No hernia is present. Musculoskeletal:         General: No tenderness. Normal range of motion. Cervical back: Normal range of motion and neck supple. Right lower leg: No edema. Left lower leg: No edema. Lymphadenopathy:      Cervical: No cervical adenopathy. Skin:     General: Skin is warm and dry. Findings: No erythema or rash. Neurological:      General: No focal deficit present. Mental Status: He is alert and oriented to person, place, and time. Cranial Nerves: No cranial nerve deficit. Motor: No abnormal muscle tone. Deep Tendon Reflexes: Reflexes are normal and symmetric. Reflexes normal.      Comments: Cranial nerves II through XII intact sensory and motor. Deep tendon reflexes in the biceps triceps knee and ankle are normal and bilaterally symmetrical.   Psychiatric:         Mood and Affect: Mood normal.         Behavior: Behavior normal.         Thought Content: Thought content normal.         Judgment: Judgment normal.           ICD-10-CM ICD-9-CM    1. Medicare annual wellness visit, subsequent  Z00.00 V70.0    2. Atrial fibrillation, unspecified type (HCC)  I48.91 427.31 AMB POC PT/INR   3. Coronary artery disease involving native coronary artery of native heart without angina pectoris  I25.10 414.01    4. Primary hypertension  I10 401.9 COLLECTION CAPILLARY BLOOD SPECIMEN      COLLECTION VENOUS BLOOD,VENIPUNCTURE      METABOLIC PANEL, COMPREHENSIVE      CBC WITH AUTOMATED DIFF      CBC WITH AUTOMATED DIFF      METABOLIC PANEL, COMPREHENSIVE      LIPID PANEL   5. Acquired hypothyroidism  E03.9 244.9 TSH 3RD GENERATION      TSH 3RD GENERATION   6. Vocal cord paralysis, bilateral partial  J38.02 478.33    7.  Bilateral carpal tunnel syndrome  G56.03 354.0 8. Benign prostatic hyperplasia with weak urinary stream  N40.1 600.01     R39.12 788.62    9. Chronic allergic rhinitis  J30.9 477.9 predniSONE (DELTASONE) 10 mg tablet   10. B12 deficiency  E53.8 266.2 METHYLMALONIC ACID      METHYLMALONIC ACID   11. ALEJANDRO (generalized anxiety disorder)  F41.1 300.02    12. Mixed dyslipidemia  E78.2 272.2 LIPID PANEL   13. Type II or unspecified type diabetes mellitus with neurological manifestations, not stated as uncontrolled(250.60) ([facility] [address]  E11.49 250.60 MICROALBUMIN, UR, RAND W/ MICROALB/CREAT RATIO      MICROALBUMIN, UR, RAND W/ MICROALB/CREAT RATIO   14. Moderate persistent asthma without complication  U53.43 835.37 predniSONE (DELTASONE) 10 mg tablet       Orders Placed This Encounter    COLLECTION CAPILLARY BLOOD SPECIMEN    LIPID PANEL     Standing Status:   Future     Number of Occurrences:   1     Standing Expiration Date:   6/72/4247    METABOLIC PANEL, COMPREHENSIVE     Standing Status:   Future     Number of Occurrences:   1     Standing Expiration Date:   6/12/2022    MICROALBUMIN, UR, RAND W/ MICROALB/CREAT RATIO     Standing Status:   Future     Number of Occurrences:   1     Standing Expiration Date:   6/12/2022    CBC WITH AUTOMATED DIFF     Standing Status:   Future     Number of Occurrences:   1     Standing Expiration Date:   5/12/2023    TSH 3RD GENERATION     Standing Status:   Future     Number of Occurrences:   1     Standing Expiration Date:   5/12/2023    METHYLMALONIC ACID     Standing Status:   Future     Number of Occurrences:   1     Standing Expiration Date:   5/12/2023    AMB POC PT/INR    COLLECTION VENOUS BLOOD,VENIPUNCTURE    predniSONE (DELTASONE) 10 mg tablet     Sig: Take 10 mg by mouth daily (with breakfast). Dispense:  30 Tablet     Refill:  5       Follow-up and Dispositions    · Return in about 6 months (around 11/12/2022), or if symptoms worsen or fail to improve.          Joao Dukes MD          Assessment/Plan Education and counseling provided:  Are appropriate based on today's review and evaluation    1. Medicare annual wellness visit, subsequent  2. Atrial fibrillation, unspecified type (HCC)  -     AMB POC PT/INR  3. Coronary artery disease involving native coronary artery of native heart without angina pectoris  4. Primary hypertension  -     COLLECTION CAPILLARY BLOOD SPECIMEN  -     COLLECTION VENOUS BLOOD,VENIPUNCTURE  -     METABOLIC PANEL, COMPREHENSIVE; Future  -     CBC WITH AUTOMATED DIFF; Future  5. Acquired hypothyroidism  -     TSH 3RD GENERATION; Future  6. Vocal cord paralysis, bilateral partial  7. Bilateral carpal tunnel syndrome  8. Benign prostatic hyperplasia with weak urinary stream  9. Chronic allergic rhinitis  -     predniSONE (DELTASONE) 10 mg tablet; Take 10 mg by mouth daily (with breakfast). , No Print, Disp-30 Tablet, R-5  10. B12 deficiency  -     METHYLMALONIC ACID; Future  11. ALEJANDRO (generalized anxiety disorder)  12. Mixed dyslipidemia  -     LIPID PANEL; Future  13. Type II or unspecified type diabetes mellitus with neurological manifestations, not stated as uncontrolled(250.60) ([facility])  -     MICROALBUMIN, UR, RAND W/ MICROALB/CREAT RATIO; Future  14. Moderate persistent asthma without complication  -     predniSONE (DELTASONE) 10 mg tablet; Take 10 mg by mouth daily (with breakfast). , No Print, Disp-30 Tablet, R-5       Depression Risk Factor Screening     3 most recent PHQ Screens 5/12/2022   Little interest or pleasure in doing things Not at all   Feeling down, depressed, irritable, or hopeless Not at all   Total Score PHQ 2 0       Alcohol & Drug Abuse Risk Screen    Do you average more than 1 drink per night or more than 7 drinks a week: Yes    In the past three months have you have had more than 4 drinks containing alcohol on one occasion: Yes  Functional Ability:   Does the patient exhibit a steady gait?   {gen no default/yes/free text:[id number]::yes   How long did it take the patient to get up and walk from a sitting position? 2sec   Is the patient self reliant?  (ie can do own laundry, meals, household chores)  yes     Does the patient handle his/her own medications? yes     Does the patient handle his/her own money? yes     Is the patients home safe (ie good lighting, handrails on stairs and bath, etc.)? yes     Did you notice or did patient express any hearing difficulties? no     Did you notice or did patient express any vision difficulties?   no     Were distance and reading eye charts used? no       Advance Care Planning:   Patient was offered the opportunity to discuss advance care planning:  yes     Does patient have an Advance Directive:  no   If no, did you provide information on Caring Connections? yes     ADL Assessment 5/12/2022   Feeding yourself No Help Needed   Getting from bed to chair No Help Needed   Getting dressed No Help Needed   Bathing or showering No Help Needed   Walk across the room (includes cane/walker) No Help Needed   Using the telphone No Help Needed   Taking your medications No Help Needed   Preparing meals No Help Needed   Managing money (expenses/bills) No Help Needed   Moderately strenuous housework (laundry) No Help Needed   Shopping for personal items (toiletries/medicines) No Help Needed   Shopping for groceries No Help Needed   Driving No Help Needed   Climbing a flight of stairs No Help Needed   Getting to places beyond walking distances No Help Needed               Functional Ability and Level of Safety    Hearing: The patient wears hearing aids. Activities of Daily Living: The home contains: no safety equipment. Patient does total self care      Ambulation: with no difficulty     Fall Risk:  Fall Risk Assessment, last 12 mths 5/12/2022   Able to walk? Yes   Fall in past 12 months? 0   Do you feel unsteady?  0   Are you worried about falling 0      Abuse Screen:  Patient is not abused       Cognitive Screening    Has your family/caregiver stated any concerns about your memory: no     Cognitive Screening: Normal - Clock Drawing Test    Health Maintenance Due   There are no preventive care reminders to display for this patient. Patient Care Team   Patient Care Team:  Seth Willams MD as PCP - General (Family Medicine)  Seth Willams MD [facility] P[facility] - [facility] Empaneled Provider    History     Patient Active Problem List   Diagnosis Code    Dysarthria     Dysphagia R13.10    Vocal cord paralysis, bilateral partial J38.02    CTS (carpal tunnel syndrome), bilateral G56.00    Hypothyroid E03.9    B12 deficiency E53.8    Cervical spondylolysis M43.02    Type II or unspecified type diabetes mellitus with neurological manifestations, not stated as uncontrolled(250.60) ([address] E11.49    Occlusion and stenosis of carotid artery without mention of cerebral infarction I65.29    HTN (hypertension) I10    CAD (coronary artery disease) I25.10    A-fib ([address] I48.91    Benign prostatic hyperplasia N40.0    Osteoarthritis of left shoulder M19.012    Rotator cuff tear arthropathy M75.100, M12.819    Rotator cuff arthropathy M12.819    DJD (degenerative joint disease) of knee M17.10    Osteoarthrosis, localized, primary, knee, left M17.12    S/P total knee replacement, 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    Chronic allergic rhinitis J30.9    History of colon polyps Z86.010    ALEJANDRO (generalized anxiety disorder) F41.1    Mixed dyslipidemia E78.2     Past Medical History:   Diagnosis Date    Adverse effect of anesthesia     Could not seat LMA's x2 sizes due to leak.   no trouble with intubation 2/2106    Arrhythmia     h/o A-Fib    Arrhythmia     SSS    Arthritis     Asthma     CAD (coronary artery disease)     h/o stents    Diabetes ([address]     borderline per pt. 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 after 2/2016 shoulder surgery, pt reports that he had to see ENT. pt states he was told that vocal cords were \"bruised\"  pt states at this time he is ~ 50% improved. 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     Current Outpatient Medications   Medication Sig Dispense Refill    predniSONE (DELTASONE) 10 mg tablet Take 10 mg by mouth daily (with breakfast). 30 Tablet 5    albuterol (PROVENTIL HFA, VENTOLIN HFA, PROAIR HFA) 90 mcg/actuation inhaler Take 1 Puff by inhalation every six (6) hours as needed for Wheezing. 18 g 3    warfarin (COUMADIN) 5 mg tablet Take 1 tablet daily on Monday Tuesday Wednesday Friday Thursday and Sunday and take half a tablet  Saturday 90 Tablet 0    tamsulosin (FLOMAX) 0.4 mg capsule Take 1 capsule by mouth once daily 90 Capsule 0    citalopram (CELEXA) 20 mg tablet Take 1 tablet by mouth once daily 90 Tablet 0    gabapentin (NEURONTIN) 600 mg tablet Take 1 Tablet by mouth nightly as needed for Pain. Max Daily Amount: 600 mg. 30 Tablet 2    gemfibroziL (LOPID) 600 mg tablet Take 1 tablet by mouth twice daily 180 Tablet 1    pantoprazole (PROTONIX) 40 mg tablet TAKE 1 TABLET BY MOUTH ONCE DAILY IN THE EVENING 90 Tablet 0    losartan (COZAAR) 50 mg tablet Take 1 tablet by mouth once daily 90 Tablet 0    ipratropium (ATROVENT) 42 mcg (0.06 %) nasal spray 1 [place] by Both Nostrils route four (4) times daily. Indications: runny nose 15 mL 0    atenoloL (TENORMIN) 25 mg tablet Take 1 Tablet by mouth daily. (Patient taking differently: Take 40 mg by mouth daily.) 90 Tablet 1    lovastatin (MEVACOR) 40 mg tablet Take 1 Tablet by mouth nightly. 90 Tablet 1    multivitamin (ONE A DAY) tablet Take 1 Tablet by mouth daily.  90 Tablet 1     Allergies   Allergen Reactions    Ace Inhibitors Cough    Bupivacaine Hives, Rash, Itching and Other (comments)     Wheezing       Family History   Problem Relation Age of Onset    Heart Disease Mother     Heart Disease Father     Cancer Father         prostate    Other Father         mersa sepsis     Social History     Tobacco Use    Smoking status: Former Smoker     Quit date: 1979     Years since quittin.3    Smokeless tobacco: Never Used   Substance Use Topics    Alcohol use: Yes     Comment: few drinks per year         MAGNOLIA Ulrich MD

## 2022-05-13 LAB
ALBUMIN SERPL-MCNC: 4 G/DL (ref 3.5–5)
ALBUMIN/GLOB SERPL: 1.1 {RATIO} (ref 1.1–2.2)
ALP SERPL-CCNC: 74 U/L (ref 45–117)
ALT SERPL-CCNC: 19 U/L (ref 12–78)
ANION GAP SERPL CALC-SCNC: 6 MMOL/L (ref 5–15)
AST SERPL-CCNC: 16 U/L (ref 15–37)
BASOPHILS # BLD: 0.1 K/UL (ref 0–0.1)
BASOPHILS NFR BLD: 1 % (ref 0–1)
BILIRUB SERPL-MCNC: 0.4 MG/DL (ref 0.2–1)
BUN SERPL-MCNC: 16 MG/DL (ref 6–20)
BUN/CREAT SERPL: 17 (ref 12–20)
CALCIUM SERPL-MCNC: 9.3 MG/DL (ref 8.5–10.1)
CHLORIDE SERPL-SCNC: 105 MMOL/L (ref 97–108)
CHOLEST SERPL-MCNC: 226 MG/DL
CO2 SERPL-SCNC: 27 MMOL/L (ref 21–32)
CREAT SERPL-MCNC: 0.96 MG/DL (ref 0.7–1.3)
CREAT UR-MCNC: 155 MG/DL
DIFFERENTIAL METHOD BLD: ABNORMAL
EOSINOPHIL # BLD: 0.2 K/UL (ref 0–0.4)
EOSINOPHIL NFR BLD: 3 % (ref 0–7)
ERYTHROCYTE [DISTWIDTH] IN BLOOD BY AUTOMATED COUNT: 14.5 % (ref 11.5–14.5)
GLOBULIN SER CALC-MCNC: 3.7 G/DL (ref 2–4)
GLUCOSE SERPL-MCNC: 106 MG/DL (ref 65–100)
HCT VFR BLD AUTO: 43.9 % (ref 36.6–50.3)
HDLC SERPL-MCNC: 31 MG/DL
HDLC SERPL: 7.3 {RATIO} (ref 0–5)
HGB BLD-MCNC: 14.6 G/DL (ref 12.1–17)
IMM GRANULOCYTES # BLD AUTO: 0 K/UL (ref 0–0.04)
IMM GRANULOCYTES NFR BLD AUTO: 1 % (ref 0–0.5)
LDLC SERPL CALC-MCNC: 149.8 MG/DL (ref 0–100)
LYMPHOCYTES # BLD: 1.3 K/UL (ref 0.8–3.5)
LYMPHOCYTES NFR BLD: 26 % (ref 12–49)
MCH RBC QN AUTO: 31.9 PG (ref 26–34)
MCHC RBC AUTO-ENTMCNC: 33.3 G/DL (ref 30–36.5)
MCV RBC AUTO: 96.1 FL (ref 80–99)
MICROALBUMIN UR-MCNC: 1.06 MG/DL
MICROALBUMIN/CREAT UR-RTO: 7 MG/G (ref 0–30)
MONOCYTES # BLD: 0.8 K/UL (ref 0–1)
MONOCYTES NFR BLD: 16 % (ref 5–13)
NEUTS SEG # BLD: 2.6 K/UL (ref 1.8–8)
NEUTS SEG NFR BLD: 53 % (ref 32–75)
NRBC # BLD: 0 K/UL (ref 0–0.01)
NRBC BLD-RTO: 0 PER 100 WBC
PLATELET # BLD AUTO: 265 K/UL (ref 150–400)
PMV BLD AUTO: 11.4 FL (ref 8.9–12.9)
POTASSIUM SERPL-SCNC: 4.1 MMOL/L (ref 3.5–5.1)
PROT SERPL-MCNC: 7.7 G/DL (ref 6.4–8.2)
RBC # BLD AUTO: 4.57 M/UL (ref 4.1–5.7)
SODIUM SERPL-SCNC: 138 MMOL/L (ref 136–145)
TRIGL SERPL-MCNC: 226 MG/DL (ref ?–150)
TSH SERPL DL<=0.05 MIU/L-ACNC: 2.42 UIU/ML (ref 0.36–3.74)
VLDLC SERPL CALC-MCNC: 45.2 MG/DL
WBC # BLD AUTO: 5 K/UL (ref 4.1–11.1)

## 2022-05-20 LAB — METHYLMALONATE SERPL-SCNC: 155 NMOL/L (ref 0–378)

## 2022-05-26 DIAGNOSIS — I48.91 ATRIAL FIBRILLATION, UNSPECIFIED TYPE (HCC): ICD-10-CM

## 2022-05-26 RX ORDER — WARFARIN SODIUM 5 MG/1
TABLET ORAL
Qty: 90 TABLET | Refills: 0 | Status: SHIPPED | OUTPATIENT
Start: 2022-05-26 | End: 2022-09-09

## 2022-06-15 ENCOUNTER — OFFICE VISIT (OUTPATIENT)
Dept: FAMILY MEDICINE CLINIC | Age: 82
End: 2022-06-15
Payer: MEDICARE

## 2022-06-15 VITALS
BODY MASS INDEX: 34.86 KG/M2 | HEIGHT: 71 IN | OXYGEN SATURATION: 98 % | WEIGHT: 249 LBS | SYSTOLIC BLOOD PRESSURE: 122 MMHG | DIASTOLIC BLOOD PRESSURE: 78 MMHG | RESPIRATION RATE: 20 BRPM | HEART RATE: 76 BPM | TEMPERATURE: 97.6 F

## 2022-06-15 DIAGNOSIS — I65.29 ASYMPTOMATIC CAROTID ARTERY STENOSIS, UNSPECIFIED LATERALITY: ICD-10-CM

## 2022-06-15 DIAGNOSIS — I25.10 CORONARY ARTERY DISEASE INVOLVING NATIVE CORONARY ARTERY OF NATIVE HEART WITHOUT ANGINA PECTORIS: ICD-10-CM

## 2022-06-15 DIAGNOSIS — Z79.01 LONG TERM (CURRENT) USE OF ANTICOAGULANTS: Primary | ICD-10-CM

## 2022-06-15 DIAGNOSIS — I48.91 ATRIAL FIBRILLATION, UNSPECIFIED TYPE (HCC): ICD-10-CM

## 2022-06-15 DIAGNOSIS — I73.9 PERIPHERAL ARTERIAL DISEASE (HCC): ICD-10-CM

## 2022-06-15 LAB
INR BLD: 3.6
PT POC: 48.6 SECONDS
VALID INTERNAL CONTROL?: YES

## 2022-06-15 PROCEDURE — 85610 PROTHROMBIN TIME: CPT | Performed by: FAMILY MEDICINE

## 2022-06-15 PROCEDURE — 99213 OFFICE O/P EST LOW 20 MIN: CPT | Performed by: FAMILY MEDICINE

## 2022-06-15 NOTE — PROGRESS NOTES
Dora Pagan is a [de-identified] y.o. male who presents with the following:  Chief Complaint   Patient presents with    Anticoagulation    Heart Problem     Fibrillation    Carotid Artery Stenosis     Pulm artery disease       Patient's anticoagulation is adequate at 3.6 and I have told him he needs to eat a little more green leafy vegetables to pull it down to a safer level although this is definitely therapeutic and obscene some surgeons recommend 4.0 for the INR. Patient does have atrial fibrillation and this is what he is treating and is doing well without any unusual bruising or bleeding. Patient also has carotid artery stenosis has not been checked in a while and have suggested he should have a Doppler of his carotids and he agrees. Allergies   Allergen Reactions    Ace Inhibitors Cough    Bupivacaine Hives, Rash, Itching and Other (comments)     Wheezing       Current Outpatient Medications   Medication Sig    pantoprazole (PROTONIX) 40 mg tablet TAKE 1 TABLET BY MOUTH ONCE DAILY IN THE EVENING    losartan (COZAAR) 50 mg tablet Take 1 tablet by mouth once daily    warfarin (COUMADIN) 5 mg tablet TAKE 1 TABLET BY MOUTH ON MONDAYS, TUESDAYS, WEDNESDAY, FRIDAY, AND SUNDAY. TAKE 1/2 TAB ON THURSDAY AND SATURDAY    predniSONE (DELTASONE) 10 mg tablet Take 10 mg by mouth daily (with breakfast).  albuterol (PROVENTIL HFA, VENTOLIN HFA, PROAIR HFA) 90 mcg/actuation inhaler Take 1 Puff by inhalation every six (6) hours as needed for Wheezing.  tamsulosin (FLOMAX) 0.4 mg capsule Take 1 capsule by mouth once daily    citalopram (CELEXA) 20 mg tablet Take 1 tablet by mouth once daily    gemfibroziL (LOPID) 600 mg tablet Take 1 tablet by mouth twice daily    ipratropium (ATROVENT) 42 mcg (0.06 %) nasal spray 1 [place] by Both Nostrils route four (4) times daily. Indications: runny nose    atenoloL (TENORMIN) 25 mg tablet Take 1 Tablet by mouth daily.  (Patient taking differently: Take 40 mg by mouth daily.)  lovastatin (MEVACOR) 40 mg tablet Take 1 Tablet by mouth nightly.  multivitamin (ONE A DAY) tablet Take 1 Tablet by mouth daily.  gabapentin (NEURONTIN) 600 mg tablet Take 1 Tablet by mouth nightly as needed for Pain. Max Daily Amount: 600 mg. No current facility-administered medications for this visit. Past Medical History:   Diagnosis Date    Adverse effect of anesthesia     Could not seat LMA's x2 sizes due to leak. no trouble with intubation 2/2106    Arrhythmia     h/o A-Fib    Arrhythmia     SSS    Arthritis     Asthma     CAD (coronary artery disease)     h/o stents    Diabetes ([facility] [address]     borderline per pt. 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 after 2/2016 shoulder surgery, pt reports that he had to see ENT. pt states he was told that vocal cords were \"bruised\"  pt states at this time he is ~ 50% improved.        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       Family History   Problem Relation Age of Onset    Heart Disease Mother     Heart Disease Father     Cancer Father         prostate    Other Father         mersa sepsis       Social History     Socioeconomic History    Marital status:    Tobacco Use    Smoking status: Former Smoker     Quit date: 1979     Years since quittin.4    Smokeless tobacco: Never Used   Substance and Sexual Activity    Alcohol use: Yes     Comment: few drinks per year    Drug use: No       Review of Systems   Constitutional: Negative for chills, fever, malaise/fatigue and weight loss. HENT: Negative for congestion, hearing loss, sore throat and tinnitus. Eyes: Negative for blurred vision, pain and discharge. Respiratory: Negative for cough, shortness of breath and wheezing. Cardiovascular: Negative for chest pain, palpitations, orthopnea, claudication and leg swelling. Gastrointestinal: Negative for abdominal pain, constipation and heartburn. Genitourinary: Negative for dysuria, frequency and urgency. Musculoskeletal: Negative for falls, joint pain and myalgias. Skin: Negative for itching and rash. Neurological: Negative for dizziness, tingling, tremors and headaches. Endo/Heme/Allergies: Negative for environmental allergies and polydipsia. Does not bruise/bleed easily. Psychiatric/Behavioral: Negative for depression and substance abuse. The patient is not nervous/anxious. Visit Vitals  /78   Pulse 76   Temp 97.6 °F (36.4 °C)   Resp 20   Ht 5' 11\" (1.803 m)   Wt 249 lb (112.9 kg)   SpO2 98%   BMI 34.73 kg/m²     Physical Exam  Vitals reviewed. Constitutional:       General: He is not in acute distress. Appearance: Normal appearance. He is obese. He is not ill-appearing. HENT:      Head: Normocephalic and atraumatic. Right Ear: Tympanic membrane, ear canal and external ear normal.      Left Ear: Tympanic membrane, ear canal and external ear normal.      Nose: Nose normal. No congestion or rhinorrhea. Mouth/Throat:      Mouth: Mucous membranes are moist.      Pharynx: No oropharyngeal exudate or posterior oropharyngeal erythema. Eyes:      Extraocular Movements: Extraocular movements intact.       Conjunctiva/sclera: Conjunctivae normal.      Pupils: Pupils are equal, round, and reactive to light. Comments: Pupil iris and anterior chamber normal.   Neck:      Trachea: No tracheal deviation. Cardiovascular:      Rate and Rhythm: Normal rate and regular rhythm. Pulses: Normal pulses. Heart sounds: Normal heart sounds. No murmur heard. No friction rub. No gallop. Pulmonary:      Effort: Pulmonary effort is normal. No respiratory distress. Breath sounds: Normal breath sounds. No wheezing, rhonchi or rales. Chest:      Chest wall: No tenderness. Abdominal:      General: Bowel sounds are normal. There is no distension. Palpations: Abdomen is soft. There is no mass. Tenderness: There is no abdominal tenderness. There is no guarding or rebound. Hernia: No hernia is present. Musculoskeletal:         General: No tenderness. Normal range of motion. Cervical back: Normal range of motion and neck supple. Right lower leg: No edema. Left lower leg: No edema. Lymphadenopathy:      Cervical: No cervical adenopathy. Skin:     General: Skin is warm and dry. Findings: No bruising, erythema or rash. Neurological:      General: No focal deficit present. Mental Status: He is alert and oriented to person, place, and time. Cranial Nerves: No cranial nerve deficit. Motor: No abnormal muscle tone. Deep Tendon Reflexes: Reflexes are normal and symmetric. Reflexes normal.      Comments: Cranial nerves II through XII intact sensory and motor. Deep tendon reflexes in the biceps triceps knee and ankle are normal and bilaterally symmetrical.   Psychiatric:         Mood and Affect: Mood normal.         Behavior: Behavior normal.         Thought Content: Thought content normal.         Judgment: Judgment normal.           ICD-10-CM ICD-9-CM    1. Long term (current) use of anticoagulants  Z79.01 V58.61 COLLECTION CAPILLARY BLOOD SPECIMEN      AMB POC PT/INR   2.  [facility]  I48.91 427.31 3. Peripheral arterial disease (HCC)  I73.9 443.9 DUPLEX CAROTID BILATERAL   4. Coronary artery disease involving native coronary artery of native heart without angina pectoris  I25.10 414.01    5.  Asymptomatic carotid artery stenosis, unspecified laterality  I65.29 433.10 DUPLEX CAROTID BILATERAL       Orders Placed This Encounter    COLLECTION CAPILLARY BLOOD SPECIMEN    AMB POC PT/INR    DUPLEX CAROTID BILATERAL     Standing Status:   Future     Standing Expiration Date:   12/15/2022     Scheduling Instructions:      Patient prefers to go to Albina Case MD

## 2022-07-13 DIAGNOSIS — F41.1 GAD (GENERALIZED ANXIETY DISORDER): ICD-10-CM

## 2022-07-13 DIAGNOSIS — N40.1 BENIGN PROSTATIC HYPERPLASIA WITH WEAK URINARY STREAM: ICD-10-CM

## 2022-07-13 DIAGNOSIS — R39.12 BENIGN PROSTATIC HYPERPLASIA WITH WEAK URINARY STREAM: ICD-10-CM

## 2022-07-13 RX ORDER — TAMSULOSIN HYDROCHLORIDE 0.4 MG/1
CAPSULE ORAL
Qty: 90 CAPSULE | Refills: 1 | Status: SHIPPED | OUTPATIENT
Start: 2022-07-13

## 2022-07-13 RX ORDER — CITALOPRAM 20 MG/1
TABLET, FILM COATED ORAL
Qty: 90 TABLET | Refills: 1 | Status: SHIPPED | OUTPATIENT
Start: 2022-07-13

## 2022-07-15 ENCOUNTER — OFFICE VISIT (OUTPATIENT)
Dept: FAMILY MEDICINE CLINIC | Age: 82
End: 2022-07-15
Payer: MEDICARE

## 2022-07-15 VITALS
BODY MASS INDEX: 35 KG/M2 | SYSTOLIC BLOOD PRESSURE: 121 MMHG | RESPIRATION RATE: 18 BRPM | TEMPERATURE: 97.6 F | HEART RATE: 74 BPM | WEIGHT: 250 LBS | DIASTOLIC BLOOD PRESSURE: 70 MMHG | OXYGEN SATURATION: 98 % | HEIGHT: 71 IN

## 2022-07-15 DIAGNOSIS — Z79.01 LONG TERM CURRENT USE OF ANTICOAGULANT: Primary | ICD-10-CM

## 2022-07-15 LAB
INR BLD: 4.1
PT POC: 49 SECONDS
VALID INTERNAL CONTROL?: YES

## 2022-07-15 PROCEDURE — 99213 OFFICE O/P EST LOW 20 MIN: CPT | Performed by: FAMILY MEDICINE

## 2022-07-15 PROCEDURE — 85610 PROTHROMBIN TIME: CPT | Performed by: FAMILY MEDICINE

## 2022-07-15 NOTE — PROGRESS NOTES
Jimmie Cary is a [de-identified] y.o. male who presents with the following:  Chief Complaint   Patient presents with    Anticoagulation     inr only    Heart Problem     Atrial fibrillation       Patient is on warfarin for atrial fibrillation and has been doing well on it without any excessive bruising or bleeding however his INR today was 4.1 and the patient admits to not eating a good diet as of late as he is packing up and downsizing to move back to [employer].  In general the patient has been feeling well without any complaints and he hopes to have his move complete before next month. I told the patient he needed to find a doctor in his old home grounds and set up with them so they can follow his INR and in the meantime he needed to start eating at least 2 salads a week and 3 would be better to get his INR back down to normal therapeutic range. I told the patient not like him somewhere between 2 and 3.5 but not higher. Allergies   Allergen Reactions    Ace Inhibitors Cough    Bupivacaine Hives, Rash, Itching and Other (comments)     Wheezing       Current Outpatient Medications   Medication Sig    tamsulosin (FLOMAX) 0.4 mg capsule Take 1 capsule by mouth once daily    citalopram (CELEXA) 20 mg tablet Take 1 tablet by mouth once daily    pantoprazole (PROTONIX) 40 mg tablet TAKE 1 TABLET BY MOUTH ONCE DAILY IN THE EVENING    losartan (COZAAR) 50 mg tablet Take 1 tablet by mouth once daily    warfarin (COUMADIN) 5 mg tablet TAKE 1 TABLET BY MOUTH ON MONDAYS, TUESDAYS, WEDNESDAY, FRIDAY, AND SUNDAY. TAKE 1/2 TAB ON THURSDAY AND SATURDAY    albuterol (PROVENTIL HFA, VENTOLIN HFA, PROAIR HFA) 90 mcg/actuation inhaler Take 1 Puff by inhalation every six (6) hours as needed for Wheezing.  gemfibroziL (LOPID) 600 mg tablet Take 1 tablet by mouth twice daily    ipratropium (ATROVENT) 42 mcg (0.06 %) nasal spray 1 [place] by Both Nostrils route four (4) times daily.  Indications: runny nose    atenoloL (TENORMIN) 25 mg tablet Take 1 Tablet by mouth daily. (Patient taking differently: Take 40 mg by mouth daily.)    lovastatin (MEVACOR) 40 mg tablet Take 1 Tablet by mouth nightly.  multivitamin (ONE A DAY) tablet Take 1 Tablet by mouth daily.  predniSONE (DELTASONE) 10 mg tablet Take 10 mg by mouth daily (with breakfast). (Patient not taking: Reported on 7/15/2022)     No current facility-administered medications for this visit. Past Medical History:   Diagnosis Date    Adverse effect of anesthesia     Could not seat LMA's x2 sizes due to leak. no trouble with intubation 2/2106    Arrhythmia     h/o A-Fib    Arrhythmia     SSS    Arthritis     Asthma     CAD (coronary artery disease)     h/o stents    Diabetes ([facility] [address]     borderline per pt. 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 after 2/2016 shoulder surgery, pt reports that he had to see ENT. pt states he was told that vocal cords were \"bruised\"  pt states at this time he is ~ 50% improved.        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       Family History   Problem Relation Age of Onset    Heart Disease Mother     Heart Disease Father     Cancer Father         prostate    Other Father         mersa sepsis       Social History     Socioeconomic History    Marital status:    Tobacco Use    Smoking status: Former Smoker     Quit date: 1979     Years since quittin.5    Smokeless tobacco: Never Used   Substance and Sexual Activity    Alcohol use: Yes     Comment: few drinks per year    Drug use: No       Review of Systems   Constitutional: Negative for chills, fever, malaise/fatigue and weight loss. HENT: Negative for congestion, hearing loss, sore throat and tinnitus. Eyes: Negative for blurred vision, pain and discharge. Respiratory: Negative for cough, shortness of breath and wheezing. Cardiovascular: Negative for chest pain, palpitations, orthopnea, claudication and leg swelling. Gastrointestinal: Negative for abdominal pain, constipation and heartburn. Genitourinary: Negative for dysuria, frequency and urgency. Musculoskeletal: Negative for falls, joint pain and myalgias. Skin: Negative for itching and rash. Neurological: Negative for dizziness, tingling, tremors and headaches. Endo/Heme/Allergies: Negative for environmental allergies and polydipsia. Does not bruise/bleed easily. Psychiatric/Behavioral: Negative for depression and substance abuse. The patient is not nervous/anxious. Visit Vitals  /70   Pulse 74   Temp 97.6 °F (36.4 °C)   Resp 18   Ht 5' 11\" (1.803 m)   Wt 250 lb (113.4 kg)   SpO2 98%   BMI 34.87 kg/m²     Physical Exam  Vitals reviewed. Constitutional:       General: He is not in acute distress. Appearance: Normal appearance. He is obese. He is not ill-appearing. HENT:      Head: Normocephalic and atraumatic. Right Ear: Tympanic membrane, ear canal and external ear normal.      Left Ear: Tympanic membrane, ear canal and external ear normal.      Nose: Nose normal. No congestion or rhinorrhea. Mouth/Throat:      Mouth: Mucous membranes are moist.      Pharynx: No oropharyngeal exudate or posterior oropharyngeal erythema.    Eyes: Extraocular Movements: Extraocular movements intact. Conjunctiva/sclera: Conjunctivae normal.      Pupils: Pupils are equal, round, and reactive to light. Comments: Pupil iris and anterior chamber normal.   Neck:      Trachea: No tracheal deviation. Cardiovascular:      Rate and Rhythm: Normal rate and regular rhythm. Pulses: Normal pulses. Heart sounds: Normal heart sounds. No murmur heard. No friction rub. No gallop. Pulmonary:      Effort: Pulmonary effort is normal. No respiratory distress. Breath sounds: Normal breath sounds. No wheezing, rhonchi or rales. Chest:      Chest wall: No tenderness. Abdominal:      General: Bowel sounds are normal. There is no distension. Palpations: Abdomen is soft. There is no mass. Tenderness: There is no abdominal tenderness. There is no guarding or rebound. Hernia: No hernia is present. Musculoskeletal:         General: No tenderness. Normal range of motion. Cervical back: Normal range of motion and neck supple. Lymphadenopathy:      Cervical: No cervical adenopathy. Skin:     General: Skin is warm and dry. Findings: No erythema or rash. Neurological:      General: No focal deficit present. Mental Status: He is alert and oriented to person, place, and time. Cranial Nerves: No cranial nerve deficit. Motor: No abnormal muscle tone. Deep Tendon Reflexes: Reflexes are normal and symmetric. Reflexes normal.      Comments: Cranial nerves II through XII intact sensory and motor. Deep tendon reflexes in the biceps triceps knee and ankle are normal and bilaterally symmetrical.   Psychiatric:         Mood and Affect: Mood normal.         Behavior: Behavior normal.         Thought Content: Thought content normal.         Judgment: Judgment normal.           ICD-10-CM ICD-9-CM    1.  Long term current use of anticoagulant  Z79.01 V58.61 COLLECTION CAPILLARY BLOOD SPECIMEN      AMB POC PT/INR Orders Placed This Encounter    COLLECTION CAPILLARY BLOOD SPECIMEN    AMB POC PT/INR       Follow-up and Dispositions    · Return in about 4 weeks (around 8/12/2022), or if symptoms worsen or fail to improve.          Riri Cutler MD

## 2022-08-15 ENCOUNTER — OFFICE VISIT (OUTPATIENT)
Dept: FAMILY MEDICINE CLINIC | Age: 82
End: 2022-08-15
Payer: MEDICARE

## 2022-08-15 VITALS
DIASTOLIC BLOOD PRESSURE: 67 MMHG | HEIGHT: 71 IN | WEIGHT: 249.8 LBS | RESPIRATION RATE: 16 BRPM | BODY MASS INDEX: 34.97 KG/M2 | HEART RATE: 87 BPM | SYSTOLIC BLOOD PRESSURE: 118 MMHG | OXYGEN SATURATION: 97 % | TEMPERATURE: 97.9 F

## 2022-08-15 DIAGNOSIS — Z79.01 LONG TERM (CURRENT) USE OF ANTICOAGULANTS: Primary | ICD-10-CM

## 2022-08-15 DIAGNOSIS — I48.91 ATRIAL FIBRILLATION, UNSPECIFIED TYPE (HCC): ICD-10-CM

## 2022-08-15 LAB
INR BLD: 4.5
PT POC: 58 SECONDS
VALID INTERNAL CONTROL?: YES

## 2022-08-15 PROCEDURE — 85610 PROTHROMBIN TIME: CPT | Performed by: NURSE PRACTITIONER

## 2022-08-15 PROCEDURE — 99213 OFFICE O/P EST LOW 20 MIN: CPT | Performed by: NURSE PRACTITIONER

## 2022-08-15 NOTE — PROGRESS NOTES
Kaelyn Keen is a [de-identified] y.o. male who presents today with c/o   Chief Complaint   Patient presents with    Follow-up             Assessment/ Plan:         ICD-10-CM ICD-9-CM    1. Long term (current) use of anticoagulants  Z79.01 V58.61 AMB POC PT/INR      COLLECTION CAPILLARY BLOOD SPECIMEN      2. Atrial fibrillation, unspecified type ([facility]  I48.91 427.31           Orders Placed This Encounter    COLLECTION CAPILLARY BLOOD SPECIMEN    AMB POC PT/INR   I have asked him to hold the coumadin tomorrow and start taking 5 mg coumadin Cdl-[id number]-Vwq-Friday and Sunday and take 2.5 mg on Thursday and Saturday  Follow up in two weeks for another INR check  Go to the ER for s/s of abnormal bleeding    Follow-up and Dispositions    Return in about 2 weeks (around 8/29/2022). Subjective:  Kaelyn Keen is a [de-identified] y.o. male here today for follow up on INR. Afib:Patient is on warfarin for atrial fibrillation-doing well without any excessive bruising or bleeding. Denies new medications. States he was in afib yesterday, but came out of it that night. He does have a pacemaker. States he had a few more glasses of wine than he usually does yesterday. His INR today is 4.5. He was told two weeks ago when his INR was 4.1 to start eating three salads weekly, but states he only had one salad each week. We discussed in the office today eating at least three salads weekly and RTO in two weeks. He already took his coumadin today. I have asked him to hold the coumadin tomorrow and start taking 5 mg coumadin Jgu-Bkgl-Uaw-Friday and Sunday and take 2.5 mg on Thursday and Saturday. Previously taking 5 mg everyday except Saturday she was taking 2.5 mg.        Patient Active Problem List   Diagnosis Code    Dysarthria     Dysphagia R13.10    Vocal cord paralysis, bilateral partial J38.02    CTS (carpal tunnel syndrome), bilateral G56.00    Hypothyroid E03.9    B12 deficiency E53.8    Cervical spondylolysis M43.02    Type II or unspecified type diabetes mellitus with neurological manifestations, not stated as uncontrolled(250.60) ([facility] E11.49    Carotid stenosis, non-symptomatic I65.29    HTN (hypertension) I10    CAD (coronary artery disease) I25.10    A-fib (HCC) I48.91    Benign prostatic hyperplasia N40.0    Osteoarthritis of left shoulder M19.012    Rotator cuff tear arthropathy M75.100, M12.819    Rotator cuff arthropathy M12.819    DJD (degenerative joint disease) of knee M17.10    Osteoarthrosis, localized, primary, knee, left M17.12    S/P total knee replacement, 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 no trouble with intubation 2/2106    Arrhythmia     h/o A-Fib    Arrhythmia     SSS    Arthritis     Asthma     CAD (coronary artery disease)     h/o stents    Diabetes ([facility]     borderline per pt. 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 after 2/2016 shoulder surgery, pt reports that he had to see ENT. pt states he was told that vocal cords were \"bruised\"  pt states at this time he is ~ 50% improved.          Current Outpatient Medications:     tamsulosin (FLOMAX) 0.4 mg capsule, Take 1 capsule by mouth once daily, Disp: 90 Capsule, Rfl: 1    citalopram (CELEXA) 20 mg tablet, Take 1 tablet by mouth once daily, Disp: 90 Tablet, Rfl: 1    pantoprazole (PROTONIX) 40 mg tablet, TAKE 1 TABLET BY MOUTH ONCE DAILY IN THE EVENING, Disp: 90 Tablet, Rfl: 1    losartan (COZAAR) 50 mg tablet, Take 1 tablet by mouth once daily, Disp: 90 Tablet, Rfl: 1    warfarin (COUMADIN) 5 mg tablet, TAKE 1 TABLET BY MOUTH ON , , WEDNESDAY, FRIDAY, AND . TAKE 1/2 TAB ON THURSDAY AND SATURDAY, Disp: 90 Tablet, Rfl: 0    albuterol (PROVENTIL HFA, VENTOLIN HFA, PROAIR HFA) 90 mcg/actuation inhaler, Take 1 Puff by inhalation every six (6) hours as needed for Wheezing., Disp: 18 g, Rfl: 3    gemfibroziL (LOPID) 600 mg tablet, Take 1 tablet by mouth twice daily, Disp: 180 Tablet, Rfl: 1    ipratropium (ATROVENT) 42 mcg (0.06 %) nasal spray, 1 [place] by Both Nostrils route four (4) times daily. Indications: runny nose, Disp: 15 mL, Rfl: 0    atenoloL (TENORMIN) 25 mg tablet, Take 1 Tablet by mouth daily. (Patient taking differently: Take 40 mg by mouth daily.), Disp: 90 Tablet, Rfl: 1    lovastatin (MEVACOR) 40 mg tablet, Take 1 Tablet by mouth nightly., Disp: 90 Tablet, Rfl: 1    multivitamin (ONE A DAY) tablet, Take 1 Tablet by mouth daily. 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       Social History     Tobacco Use   Smoking Status Former    Packs/day: 2.00    Years: 19.00    Pack years: 38.00    Types: Cigarettes    Start date: 1966   [facility] Quit date: 1979    Years since quittin.6   Smokeless Tobacco Never       Social History     Socioeconomic History  Marital status:    Tobacco Use    Smoking status: Former     Packs/day: 2.00     Years: 19.00     Pack years: 38.00     Types: Cigarettes     Start date: 1966     Quit date: 1979     Years since quittin.6    Smokeless tobacco: Never   Vaping Use    Vaping Use: Never used   Substance and Sexual Activity    Alcohol use: Yes     Comment: few drinks per year    Drug use: No    Sexual activity: Not Currently     Social Determinants of Health     Financial Resource Strain: Low Risk     Difficulty of Paying Living Expenses: Not hard at all   Food Insecurity: No Food Insecurity    Worried About Running Out of Food in the Last Year: Never true    Pop of Food in the Last Year: Never true       Family History   Problem Relation Age of Onset    Heart Disease Mother     Heart Disease Father     Cancer Father         prostate    Other Father         mersa sepsis       ROS:  Review of Systems   Constitutional:  Negative for chills and fever. HENT:  Negative for hearing loss. Eyes:  Negative for blurred vision and double vision. Respiratory:  Negative for cough. Cardiovascular:  Negative for chest pain and leg swelling. Gastrointestinal:  Negative for heartburn, nausea and vomiting. Genitourinary:  Negative for dysuria. Musculoskeletal:  Negative for myalgias. Skin:  Negative for itching and rash. Neurological:  Negative for dizziness and headaches. Objective:     Visit Vitals  /67 (BP 1 Location: Left upper arm)   Pulse 87   Temp 97.9 °F (36.6 °C) (Oral)   Resp 16   Ht 5' 11\" (1.803 m)   Wt 249 lb 12.8 oz (113.3 kg)   SpO2 97%   BMI 34.84 kg/m²     Body mass index is 34.84 kg/m². Physical Exam  Vitals reviewed. HENT:      Head: Normocephalic. Right Ear: External ear normal.      Left Ear: External ear normal.      Nose: Nose normal.      Mouth/Throat:      Pharynx: Oropharynx is clear.    Eyes:      Pupils: Pupils are equal, round, and reactive to light. Cardiovascular:      Rate and Rhythm: Normal rate. Pulses: Normal pulses. Pulmonary:      Effort: Pulmonary effort is normal.   Abdominal:      Palpations: Abdomen is soft. Musculoskeletal:         General: Normal range of motion. Cervical back: Normal range of motion. Skin:     General: Skin is warm. Neurological:      Mental Status: He is alert and oriented to person, place, and time. Psychiatric:         Mood and Affect: Mood normal.         Behavior: Behavior normal.       Results for orders placed or performed in visit on 08/15/22   AMB POC PT/INR   Result Value Ref Range    VALID INTERNAL CONTROL POC Yes     Prothrombin time (POC) 58.0 seconds    INR POC 4.5        Results for orders placed or performed in visit on 08/15/22   AMB POC PT/INR   Result Value Ref Range    VALID INTERNAL CONTROL POC Yes     Prothrombin time (POC) 58.0 seconds    INR POC 4.5            Verbal and written instructions (see AVS) provided. Patient expresses understanding of diagnosis and treatment plan. Follow-up and Dispositions    Return in about 2 weeks (around 8/29/2022). Patient has been advised to contact practice or seek care if condition persists or worsens.      Elizabeth Vergara NP

## 2022-08-15 NOTE — PROGRESS NOTES
No chief complaint on file. 1. \"Have you been to the ER, urgent care clinic since your last visit? Hospitalized since your last visit? \" No    2. \"Have you seen or consulted any other health care providers outside of the [address] [place] [address] since your last visit? \" No     3. For patients aged 39-70: Has the patient had a colonoscopy / FIT/ Cologuard? NA - based on age      If the patient is female:    4. For patients aged 41-77: Has the patient had a mammogram within the past 2 years? NA - based on age or sex      11. For patients aged 21-65: Has the patient had a pap smear? NA - based on age or sex      Identified pt with two pt identifiers(name and ). Reviewed record in preparation for visit and have obtained necessary documentation.     Symptom review:    NO  Fever   NO  Shaking chills  NO  Cough  NO Headaches  NO  Body aches  NO  Coughing up blood  NO  Chest congestion  NO  Chest pain  NO  Shortness of breath  NO  Profound Loss of smell/taste  NO  Nausea/Vomiting   NO  Loose stool/Diarrhea  NO  any skin issues

## 2022-08-30 ENCOUNTER — OFFICE VISIT (OUTPATIENT)
Dept: FAMILY MEDICINE CLINIC | Age: 82
End: 2022-08-30
Payer: MEDICARE

## 2022-08-30 VITALS
SYSTOLIC BLOOD PRESSURE: 128 MMHG | RESPIRATION RATE: 18 BRPM | DIASTOLIC BLOOD PRESSURE: 63 MMHG | HEIGHT: 71 IN | BODY MASS INDEX: 34.65 KG/M2 | OXYGEN SATURATION: 97 % | TEMPERATURE: 97.7 F | WEIGHT: 247.5 LBS | HEART RATE: 78 BPM

## 2022-08-30 DIAGNOSIS — Z79.01 LONG TERM (CURRENT) USE OF ANTICOAGULANTS: Primary | ICD-10-CM

## 2022-08-30 LAB
INR BLD: 2.7
PT POC: 32.6 SECONDS
VALID INTERNAL CONTROL?: YES

## 2022-08-30 PROCEDURE — G8752 SYS BP LESS 140: HCPCS | Performed by: NURSE PRACTITIONER

## 2022-08-30 PROCEDURE — 1123F ACP DISCUSS/DSCN MKR DOCD: CPT | Performed by: NURSE PRACTITIONER

## 2022-08-30 PROCEDURE — G8427 DOCREV CUR MEDS BY ELIG CLIN: HCPCS | Performed by: NURSE PRACTITIONER

## 2022-08-30 PROCEDURE — 85610 PROTHROMBIN TIME: CPT | Performed by: NURSE PRACTITIONER

## 2022-08-30 PROCEDURE — G8417 CALC BMI ABV UP PARAM F/U: HCPCS | Performed by: NURSE PRACTITIONER

## 2022-08-30 PROCEDURE — 1101F PT FALLS ASSESS-DOCD LE1/YR: CPT | Performed by: NURSE PRACTITIONER

## 2022-08-30 PROCEDURE — G8432 DEP SCR NOT DOC, RNG: HCPCS | Performed by: NURSE PRACTITIONER

## 2022-08-30 PROCEDURE — 99213 OFFICE O/P EST LOW 20 MIN: CPT | Performed by: NURSE PRACTITIONER

## 2022-08-30 PROCEDURE — G8754 DIAS BP LESS 90: HCPCS | Performed by: NURSE PRACTITIONER

## 2022-08-30 PROCEDURE — G8536 NO DOC ELDER MAL SCRN: HCPCS | Performed by: NURSE PRACTITIONER

## 2022-08-30 NOTE — PROGRESS NOTES
1. \"Have you been to the ER, urgent care clinic since your last visit? Hospitalized since your last visit? \" No    2. \"Have you seen or consulted any other health care providers outside of the [address] [place] [address] since your last visit? \" No     3. For patients aged 39-70: Has the patient had a colonoscopy / FIT/ Cologuard? NA - based on age      If the patient is female:    4. For patients aged 41-77: Has the patient had a mammogram within the past 2 years? NA - based on age or sex      11. For patients aged 21-65: Has the patient had a pap smear?  NA - based on age or sex

## 2022-08-30 NOTE — PROGRESS NOTES
Karla Belle is a [de-identified] y.o. male who presents today with c/o   Chief Complaint   Patient presents with    Anticoagulation       Assessment/ Plan:         ICD-10-CM ICD-9-CM    1. Long term (current) use of anticoagulants  Z79.01 V58.61 COLLECTION CAPILLARY BLOOD SPECIMEN      AMB POC PT/INR        INR today is 2.7  Continue 5 mg coumadin Bch-Bcdn-Kgm-Friday and Sunday and take 2.5 mg on Thursday and Saturday  Follow up in month for another INR check  Go to the ER for s/s of abnormal bleeding    Orders Placed This Encounter    COLLECTION CAPILLARY BLOOD SPECIMEN    AMB POC PT/INR             Subjective:  Karla Belle is a [de-identified] y.o. male here today for follow up on INR. Afib:Patient is on warfarin for atrial fibrillation-doing well without any excessive bruising or bleeding. Taking coumadin  5 mg coumadin Esm-Smft-Afs-Friday and Sunday and take 2.5 mg on Thursday and Saturday. Eugenia well.      Patient Active Problem List   Diagnosis Code    Dysarthria     Dysphagia R13.10    Vocal cord paralysis, bilateral partial J38.02    CTS (carpal tunnel syndrome), bilateral G56.00    Hypothyroid E03.9    B12 deficiency E53.8    Cervical spondylolysis M43.02    Type II or unspecified type diabetes mellitus with neurological manifestations, not stated as uncontrolled(250.60) (HCC) E11.49    Carotid stenosis, non-symptomatic I65.29    HTN (hypertension) I10    CAD (coronary artery disease) I25.10    A-fib (HCC) I48.91    Benign prostatic hyperplasia N40.0    Osteoarthritis of left shoulder M19.012    Rotator cuff tear arthropathy M75.100, M12.819    Rotator cuff arthropathy M12.819    DJD (degenerative joint disease) of knee M17.10    Osteoarthrosis, localized, primary, knee, left M17.12    S/P total knee replacement, left X81.238    Chronic allergic rhinitis J30.9    History of colon polyps Z86.010    ALEJANDRO (generalized anxiety disorder) F41.1    Mixed dyslipidemia E78.2    Peripheral arterial disease (HCC) I73.9       Past Medical History:   Diagnosis Date    Adverse effect of anesthesia     Could not seat LMA's x2 sizes due to leak. no trouble with intubation 2/2106    Arrhythmia     h/o A-Fib    Arrhythmia     SSS    Arthritis     Asthma     CAD (coronary artery disease)     h/o stents    Diabetes ([facility]     borderline per pt. 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 after 2/2016 shoulder surgery, pt reports that he had to see ENT. pt states he was told that vocal cords were \"bruised\"  pt states at this time he is ~ 50% improved. Current Outpatient Medications:     tamsulosin (FLOMAX) 0.4 mg capsule, Take 1 capsule by mouth once daily, Disp: 90 Capsule, Rfl: 1    citalopram (CELEXA) 20 mg tablet, Take 1 tablet by mouth once daily, Disp: 90 Tablet, Rfl: 1    pantoprazole (PROTONIX) 40 mg tablet, TAKE 1 TABLET BY MOUTH ONCE DAILY IN THE EVENING, Disp: 90 Tablet, Rfl: 1    losartan (COZAAR) 50 mg tablet, Take 1 tablet by mouth once daily, Disp: 90 Tablet, Rfl: 1    warfarin (COUMADIN) 5 mg tablet, TAKE 1 TABLET BY MOUTH ON MONDAYS, TUESDAYS, WEDNESDAY, FRIDAY, AND SUNDAY.  TAKE 1/2 TAB ON THURSDAY AND SATURDAY, Disp: 90 Tablet, Rfl: 0    albuterol (PROVENTIL HFA, VENTOLIN HFA, PROAIR HFA) 90 mcg/actuation inhaler, Take 1 Puff by inhalation every six (6) hours as needed for Wheezing., Disp: 18 g, Rfl: 3    gemfibroziL (LOPID) 600 mg tablet, Take 1 tablet by mouth twice daily, Disp: 180 Tablet, Rfl: 1    ipratropium (ATROVENT) 42 mcg (0.06 %) nasal spray, 1 [place] by Both Nostrils route four (4) times daily. Indications: runny nose, Disp: 15 mL, Rfl: 0    atenoloL (TENORMIN) 25 mg tablet, Take 1 Tablet by mouth daily. (Patient taking differently: Take 40 mg by mouth daily.), Disp: 90 Tablet, Rfl: 1    lovastatin (MEVACOR) 40 mg tablet, Take 1 Tablet by mouth nightly., Disp: 90 Tablet, Rfl: 1    multivitamin (ONE A DAY) tablet, Take 1 Tablet by mouth daily. 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       Social History     Tobacco Use   Smoking Status Former    Packs/day: 2.00    Years: 19.00    Pack years: 38.00    Types: Cigarettes    Start date: 1966    Quit date: 1979    Years since quittin.6   Smokeless Tobacco Never       Social History     Socioeconomic History    Marital status:    Tobacco Use    Smoking status: Former     Packs/day: 2.00     Years: 19.00     Pack years: 38.00     Types: Cigarettes     Start date: 1966     Quit date: 1979     Years since quittin.6    Smokeless tobacco: Never   Vaping Use    Vaping Use: Never used   Substance and Sexual Activity    Alcohol use: Yes     Comment: few drinks per year    Drug use: No    Sexual activity: Not Currently     Social Determinants of Health     Financial Resource Strain: Low Risk     Difficulty of Paying Living Expenses: Not hard at all   Food Insecurity: No Food Insecurity    Worried About Running Out of Food in the Last Year: Never true    Ran Out of Food in the Last Year: Never true       Family History   Problem Relation Age of Onset    Heart Disease Mother     Heart Disease Father     Cancer Father         prostate    Other Father         mersa sepsis       ROS:  Review of Systems   Constitutional:  Negative for chills and fever. HENT:  Negative for hearing loss. Eyes:  Negative for blurred vision and double vision. Respiratory:  Negative for cough. Cardiovascular:  Negative for chest pain and leg swelling. Gastrointestinal:  Negative for heartburn, nausea and vomiting. Genitourinary:  Negative for dysuria. Musculoskeletal:  Negative for myalgias. Skin:  Negative for itching and rash. Neurological:  Negative for dizziness and headaches. Objective:     Visit Vitals  /63 (BP 1 Location: Left upper arm)   Pulse 78   Temp 97.7 °F (36.5 °C) (Temporal)   Resp 18   Ht 5' 11\" (1.803 m)   Wt 247 lb 8 oz (112.3 kg)   SpO2 97%   BMI 34.52 kg/m²     Body mass index is 34.52 kg/m². Physical Exam  Vitals reviewed. HENT:      Head: Normocephalic. Right Ear: External ear normal.      Left Ear: External ear normal.      Nose: Nose normal.      Mouth/Throat:      Pharynx: Oropharynx is clear. Eyes:      Pupils: Pupils are equal, round, and reactive to light. Cardiovascular:      Rate and Rhythm: Normal rate. Pulses: Normal pulses. Pulmonary:      Effort: Pulmonary effort is normal.   Abdominal:      Palpations: Abdomen is soft. Musculoskeletal:         General: Normal range of motion. Cervical back: Normal range of motion. Skin:     General: Skin is warm. Neurological:      Mental Status: He is alert and oriented to person, place, and time.    Psychiatric:         Mood and Affect: Mood normal.         Behavior: Behavior normal.       Results for orders placed or performed in visit on 08/30/22   AMB POC PT/INR   Result Value Ref Range    VALID INTERNAL CONTROL POC Yes     Prothrombin time (POC) 32.6 seconds    INR POC 2.7        Results for orders placed or performed in visit on 08/30/22   AMB POC PT/INR   Result Value Ref Range    VALID INTERNAL CONTROL POC Yes     Prothrombin time (POC) 32.6 seconds    INR POC 2.7            Verbal and written instructions (see AVS) provided. Patient expresses understanding of diagnosis and treatment plan. Patient has been advised to contact practice or seek care if condition persists or worsens.      Get Clemens NP

## 2022-09-09 DIAGNOSIS — I48.91 ATRIAL FIBRILLATION, UNSPECIFIED TYPE (HCC): ICD-10-CM

## 2022-09-09 RX ORDER — WARFARIN SODIUM 5 MG/1
TABLET ORAL
Qty: 90 TABLET | Refills: 0 | Status: SHIPPED | OUTPATIENT
Start: 2022-09-09

## 2022-09-21 DIAGNOSIS — E78.5 DYSLIPIDEMIA: ICD-10-CM

## 2022-09-21 RX ORDER — GEMFIBROZIL 600 MG/1
TABLET, FILM COATED ORAL
Qty: 180 TABLET | Refills: 0 | Status: SHIPPED | OUTPATIENT
Start: 2022-09-21

## 2022-09-30 ENCOUNTER — TELEPHONE (OUTPATIENT)
Dept: FAMILY MEDICINE CLINIC | Age: 82
End: 2022-09-30

## 2022-09-30 NOTE — TELEPHONE ENCOUNTER
Called pt. Has appointment on 10/19 with Lennie Rider. Offered another office but pt wanted to wait.

## 2022-09-30 NOTE — TELEPHONE ENCOUNTER
----- Message from Mike Yasemindavid sent at 9/30/2022 11:03 AM EDT -----  Subject: Appointment Request    Reason for Call: Established Patient Appointment needed: Semi-Routine Skin   Problem    QUESTIONS    Reason for appointment request? No appointments available during search     Additional Information for Provider? patient is requesting a call back in   a request to scheduling appt for a growth on his middle towards the lower   back for a couple month and is progressing and getting firmer and would   like to have it looked at  ---------------------------------------------------------------------------  --------------  [address]  [phone]; OK to leave message on voicemail  ---------------------------------------------------------------------------  --------------  SCRIPT ANSWERS  COVID Screen: Declan William

## 2022-10-12 NOTE — PROGRESS NOTES
Gabino Mohr is a [de-identified] y.o. male who presents today with c/o   Chief Complaint   Patient presents with    Coagulation disorder       Assessment/ Plan:         ICD-10-CM ICD-9-CM    1. [facility], unspecified [facility]  I48.91 427.31       2. Long term (current) use of anticoagulants  Z79.01 V58.61       3. Skin cancer screening  Z12.83 V76.43 REFERRAL TO DERMATOLOGY      4. Encounter for immunization  Z23 V03.89 CANCELED: INFLUENZA, FLUARIX, FLULAVAL, FLUZONE (AGE 6 MO+), AFLURIA(AGE 3Y+) IM, PF, 0.5 ML      5. Needs flu shot  Z23 V04.81 INFLUENZA, FLUAD, (AGE 65 Y+), IM, PF, 0.5 ML        INR today is 2.6--will adjust coumadin  Take 5 mg coumadin Vra-Nhgx-Lnk-Friday and take 2.5 mg on Thursday Saturday and Sunday--dose decrease  Follow up in month for another INR check  Go to the ER for s/s of abnormal bleeding    Make an appt with dermatology     Orders Placed This Encounter    Influenza Vaccine, QUAD, 65 Yrs +  IM  ([facility] [address] [facility]    REFERRAL TO DERMATOLOGY     Referral Priority:   Routine     Referral Type:   Consultation     Referral Reason:   Specialty Services Required     Referred to Provider:   Viktoria Koenig MD     Requested Specialty:   Dermatology Physician     Number of Visits Requested:   1       Follow-up and Dispositions    Return in about 1 month (around 11/19/2022). Subjective:  Gabino Mohr is a [de-identified] y.o. male here today for INR check. Afib:Patient is on warfarin for atrial fibrillation-doing well without any excessive bruising or bleeding. Previously taking coumadin  5 mg on Pgd-Xrqc-Hih-Friday and Sunday and take 2.5 mg on Thursday and Saturday. We will have him reduce his coumadin dose to 5 mg on Mon-Tues-Wed-Friday and 2.5 mg on Thursday, Saturday and Sunday. He will RTO in one month for another INR check. He is also due for his flu shot which we will administer today. He also noticed a brown flat rough area to the right side of his lower back.  He thinks it showed up about 3 months ago, but he isn't really sure. He would like to have dermatology look at this area. His brother goes to [facility] in [place] and he would like a referral to the same place. Patient Active Problem List   Diagnosis Code    Dysarthria     Dysphagia R13.10    Vocal cord paralysis, bilateral partial J38.02    CTS (carpal tunnel syndrome), bilateral G56.00    Hypothyroid E03.9    B12 deficiency E53.8    Cervical spondylolysis M43.02    Type II or unspecified type diabetes mellitus with neurological manifestations, not stated as uncontrolled(250.60) ([facility]) E11.49    Carotid stenosis, non-symptomatic I65.29    HTN (hypertension) I10    CAD (coronary artery disease) I25.10    A-fib ([facility]) I48.91    Benign prostatic hyperplasia N40.0    Osteoarthritis of left shoulder M19.012    Rotator cuff tear arthropathy M75.100, M12.819    Rotator cuff arthropathy M12.819    DJD (degenerative joint disease) of knee M17.9    Osteoarthrosis, localized, primary, knee, left M17.12    S/P total knee replacement, 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   no trouble with intubation 2/2106    Arrhythmia     h/o A-Fib    Arrhythmia     SSS    Arthritis     Asthma     CAD (coronary artery disease)     h/o stents    Diabetes ([facility]     borderline per pt. 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 after 2/2016 shoulder surgery, pt reports that he had to see ENT. pt states he was told that vocal cords were \"bruised\"  pt states at this time he is ~ 50% improved. Current Outpatient Medications:     gemfibroziL (LOPID) 600 mg tablet, Take 1 tablet by mouth twice daily, Disp: 180 Tablet, Rfl: 0    warfarin (COUMADIN) 5 mg tablet, TAKE 1 TABLET BY MOUTH ON MONDAY,TUESDAY,WEDNESDAY,FRIDAY AND SUNDAY. TAKE 1/2 TABLET ON THURSDAY AND SATURDAY, Disp: 90 Tablet, Rfl: 0    tamsulosin (FLOMAX) 0.4 mg capsule, Take 1 capsule by mouth once daily, Disp: 90 Capsule, Rfl: 1    citalopram (CELEXA) 20 mg tablet, Take 1 tablet by mouth once daily, Disp: 90 Tablet, Rfl: 1    pantoprazole (PROTONIX) 40 mg tablet, TAKE 1 TABLET BY MOUTH ONCE DAILY IN THE EVENING, Disp: 90 Tablet, Rfl: 1    losartan (COZAAR) 50 mg tablet, Take 1 tablet by mouth once daily, Disp: 90 Tablet, Rfl: 1    albuterol (PROVENTIL HFA, VENTOLIN HFA, PROAIR HFA) 90 mcg/actuation inhaler, Take 1 Puff by inhalation every six (6) hours as needed for Wheezing., Disp: 18 g, Rfl: 3    ipratropium (ATROVENT) 42 mcg (0.06 %) nasal spray, 1 [place] by Both Nostrils route four (4) times daily. Indications: runny nose, Disp: 15 mL, Rfl: 0    atenoloL (TENORMIN) 25 mg tablet, Take 1 Tablet by mouth daily. (Patient taking differently: Take 40 mg by mouth daily.), Disp: 90 Tablet, Rfl: 1    lovastatin (MEVACOR) 40 mg tablet, Take 1 Tablet by mouth nightly., Disp: 90 Tablet, Rfl: 1    multivitamin (ONE A DAY) tablet, Take 1 Tablet by mouth daily. , Disp: 90 Tablet, Rfl: 1    Allergies   Allergen Reactions    Ace Inhibitors Cough    Bupivacaine Hives, Rash, Itching and Other (comments)     Wheezing       Past Surgical History:   Procedure Laterality Date    HX CORONARY STENT PLACEMENT  ~2011    x 1    HX HEENT      sinus surgery    HX KNEE ARTHROSCOPY Right     HX ORTHOPAEDIC (R) & (L)shoulder rotator cuff repair    HX OTHER SURGICAL      missing 4th right toe    HX PACEMAKER      right sided  [facility]       Social History     Tobacco Use   Smoking Status Former    Packs/day: 2.00    Years: 19.00    Pack years: 38.00    Types: Cigarettes    Start date: 1966    Quit date: 1979    Years since quittin.8   Smokeless Tobacco Never       Social History     Socioeconomic History    Marital status:    Tobacco Use    Smoking status: Former     Packs/day: 2.00     Years: 19.00     Pack years: 38.00     Types: Cigarettes     Start date: 1966     Quit date: 1979     Years since quittin.8    Smokeless tobacco: Never   Vaping Use    Vaping Use: Never used   Substance and Sexual Activity    Alcohol use: Yes     Comment: few drinks per year    Drug use: No    Sexual activity: Not Currently     Social Determinants of Health     Financial Resource Strain: Low Risk     Difficulty of Paying Living Expenses: Not hard at all   Food Insecurity: No Food Insecurity    Worried About Running Out of Food in the Last Year: Never true    Ran Out of Food in the Last Year: Never true       Family History   Problem Relation Age of Onset    Heart Disease Mother     Heart Disease Father     Cancer Father         prostate    Other Father         mersa sepsis       ROS:  Review of Systems   Constitutional:  Negative for chills and fever. HENT:  Negative for hearing loss. Eyes:  Negative for blurred vision and double vision. Respiratory:  Negative for cough. Cardiovascular:  Negative for chest pain and leg swelling. Gastrointestinal:  Negative for heartburn, nausea and vomiting. Genitourinary:  Negative for dysuria. Musculoskeletal:  Negative for myalgias. Skin:  Negative for itching and rash. Spot on back   Neurological:  Negative for dizziness and headaches.        Objective:     Visit Vitals  /70 (BP 1 Location: Left upper arm)   Pulse 71   Temp 98.1 °F (36.7 °C)   Resp 16   Ht 5' 11\" (1.803 m)   Wt 247 lb 6.4 oz (112.2 kg)   SpO2 97%   BMI 34.51 kg/m²     Body mass index is 34.51 kg/m². Physical Exam  Vitals reviewed. HENT:      Head: Normocephalic. Right Ear: External ear normal.      Left Ear: External ear normal.      Nose: Nose normal.      Mouth/Throat:      Pharynx: Oropharynx is clear. Eyes:      Pupils: Pupils are equal, round, and reactive to light. Cardiovascular:      Rate and Rhythm: Normal rate. Pulses: Normal pulses. Pulmonary:      Effort: Pulmonary effort is normal.   Abdominal:      Palpations: Abdomen is soft. Musculoskeletal:         General: Normal range of motion. Cervical back: Normal range of motion. Skin:     Findings: Rash present. Rash is macular. Comments: Small round macular area (smaller than a tracey) noted to right lower back with a rough surface. No surrounding redness   Neurological:      Mental Status: He is alert and oriented to person, place, and time. Psychiatric:         Mood and Affect: Mood normal.         Behavior: Behavior normal.       Results for orders placed or performed in visit on 08/30/22   AMB POC PT/INR   Result Value Ref Range    VALID INTERNAL CONTROL POC Yes     Prothrombin time (POC) 32.6 seconds    INR POC 2.7        No results found for this visit on 10/19/22. Verbal and written instructions (see AVS) provided. Patient expresses understanding of diagnosis and treatment plan. Follow-up and Dispositions    Return in about 1 month (around 11/19/2022). Patient has been advised to contact practice or seek care if condition persists or worsens.      Rodriguez Lopez NP

## 2022-10-19 ENCOUNTER — DOCUMENTATION ONLY (OUTPATIENT)
Dept: FAMILY MEDICINE CLINIC | Age: 82
End: 2022-10-19

## 2022-10-19 ENCOUNTER — OFFICE VISIT (OUTPATIENT)
Dept: FAMILY MEDICINE CLINIC | Age: 82
End: 2022-10-19
Payer: MEDICARE

## 2022-10-19 VITALS
DIASTOLIC BLOOD PRESSURE: 70 MMHG | HEART RATE: 71 BPM | TEMPERATURE: 98.1 F | SYSTOLIC BLOOD PRESSURE: 116 MMHG | OXYGEN SATURATION: 97 % | WEIGHT: 247.4 LBS | BODY MASS INDEX: 34.64 KG/M2 | RESPIRATION RATE: 16 BRPM | HEIGHT: 71 IN

## 2022-10-19 DIAGNOSIS — Z79.01 LONG TERM (CURRENT) USE OF ANTICOAGULANTS: ICD-10-CM

## 2022-10-19 DIAGNOSIS — Z12.83 SKIN CANCER SCREENING: ICD-10-CM

## 2022-10-19 DIAGNOSIS — Z23 NEEDS FLU SHOT: ICD-10-CM

## 2022-10-19 DIAGNOSIS — Z23 ENCOUNTER FOR IMMUNIZATION: ICD-10-CM

## 2022-10-19 DIAGNOSIS — I48.91 ATRIAL FIBRILLATION, UNSPECIFIED TYPE (HCC): Primary | ICD-10-CM

## 2022-10-19 PROCEDURE — G0008 ADMIN INFLUENZA VIRUS VAC: HCPCS | Performed by: NURSE PRACTITIONER

## 2022-10-19 PROCEDURE — 99213 OFFICE O/P EST LOW 20 MIN: CPT | Performed by: NURSE PRACTITIONER

## 2022-10-19 PROCEDURE — 90694 VACC AIIV4 NO PRSRV 0.5ML IM: CPT | Performed by: NURSE PRACTITIONER

## 2022-10-19 NOTE — PATIENT INSTRUCTIONS
Vaccine Information Statement    Influenza (Flu) Vaccine (Inactivated or Recombinant): What You Need to Know    Many vaccine information statements are available in [place] and other languages. See www.immunize.org/vis. Hojas de información sobre vacunas están disponibles en español y en muchos otros idiomas. Visite www.immunize.org/vis. 1. Why get vaccinated? Influenza vaccine can prevent influenza (flu). Flu is a contagious disease that spreads around the [facility] every year, usually between October and May. Anyone can get the flu, but it is more dangerous for some people. Infants and young children, people 72 years and older, pregnant people, and people with certain health conditions or a weakened immune system are at greatest risk of flu complications. Pneumonia, bronchitis, sinus infections, and ear infections are examples of flu-related complications. If you have a medical condition, such as heart disease, cancer, or diabetes, flu can make it worse. Flu can cause fever and chills, sore throat, muscle aches, fatigue, cough, headache, and runny or stuffy nose. Some people may have vomiting and diarrhea, though this is more common in children than adults. In an average year, thousands of people in the [facility] die from flu, and many more are hospitalized. Flu vaccine prevents millions of illnesses and flu-related visits to the doctor each year. 2. Influenza vaccines     CDC recommends everyone 6 months and older get vaccinated every flu season. Children 6 months through 6years of age may need 2 doses during a single flu season. Everyone else needs only 1 dose each flu season. It takes about 2 weeks for protection to develop after vaccination. There are many flu viruses, and they are always changing. Each year a new flu vaccine is made to protect against the influenza viruses believed to be likely to cause disease in the upcoming flu season.  Even when the vaccine doesnt exactly match these viruses, it may still provide some protection. Influenza vaccine does not cause flu. Influenza vaccine may be given at the same time as other vaccines. 3. Talk with your health care provider    Tell your vaccination provider if the person getting the vaccine:  Has had an allergic reaction after a previous dose of influenza vaccine, or has any severe, life-threatening allergies   Has ever had Guillain-Barré Syndrome (also called GBS)    In some cases, your health care provider may decide to postpone influenza vaccination until a future visit. Influenza vaccine can be administered at any time during pregnancy. People who are or will be pregnant during influenza season should receive inactivated influenza vaccine. People with minor illnesses, such as a cold, may be vaccinated. People who are moderately or severely ill should usually wait until they recover before getting influenza vaccine. Your health care provider can give you more information. 4. Risks of a vaccine reaction    Soreness, redness, and swelling where the shot is given, fever, muscle aches, and headache can happen after influenza vaccination. There may be a very small increased risk of Guillain-Barré Syndrome (GBS) after inactivated influenza vaccine (the flu shot). Catherine Silvestre children who get the flu shot along with pneumococcal vaccine (PCV13) and/or DTaP vaccine at the same time might be slightly more likely to have a seizure caused by fever. Tell your health care provider if a child who is getting flu vaccine has ever had a seizure. People sometimes faint after medical procedures, including vaccination. Tell your provider if you feel dizzy or have vision changes or ringing in the ears. As with any medicine, there is a very remote chance of a vaccine causing a severe allergic reaction, other serious injury, or death. 5. What if there is a serious problem?     An allergic reaction could occur after the vaccinated person leaves the clinic. If you see signs of a severe allergic reaction (hives, swelling of the face and throat, difficulty breathing, a fast heartbeat, dizziness, or weakness), call 9-1-1 and get the person to the nearest hospital.    For other signs that concern you, call your health care provider. Adverse reactions should be reported to the Vaccine Adverse Event Reporting System (VAERS). Your health care provider will usually file this report, or you can do it yourself. Visit the VAERS website at www.vaers. [facility].gov or call 0-713.998.7934. VAERS is only for reporting reactions, and VAERS staff members do not give medical advice. 6. The National Vaccine Injury Compensation Program    The [facility] Vaccine Injury Compensation Program (VICP) is a federal program that was created to compensate people who may have been injured by certain vaccines. Claims regarding alleged injury or death due to vaccination have a time limit for filing, which may be as short as two years. Visit the VICP website at www.[facility].gov/vaccinecompensation or call 4-502.327.7777 to learn about the program and about filing a claim. 7. How can I learn more? Ask your health care provider. Call your local or state health department. Visit the website of the Food and Drug Administration (FDA) for vaccine package inserts and additional information at www.fda.gov/vaccines-blood-biologics/vaccines. Contact the Centers for Disease Control and Prevention (CDC): Call 5-807.231.6247 (1-800-CDC-INFO) or  Visit CDCs influenza website at www.cdc.gov/flu. Vaccine Information Statement   Inactivated Influenza Vaccine   8/6/2021  [address]WR[address][id number]   Department of Health and Human Services  Centers for Disease Control and Prevention    Office Use Only

## 2022-10-19 NOTE — PROGRESS NOTES
Identified pt with two pt identifiers(name and ). Reviewed record in preparation for visit and have obtained necessary documentation. Chief Complaint   Patient presents with    Coagulation disorder       1. \"Have you been to the ER, urgent care clinic since your last visit? Hospitalized since your last visit? \" No    2. \"Have you seen or consulted any other health care providers outside of the [address] [place] [address] since your last visit? \" No     3. For patients aged 39-70: Has the patient had a colonoscopy / FIT/ Cologuard? NA - based on age      If the patient is female:    4. For patients aged 41-77: Has the patient had a mammogram within the past 2 years? NA - based on age or sex      11. For patients aged 21-65: Has the patient had a pap smear?  NA - based on age or sex

## 2022-11-03 ENCOUNTER — TELEPHONE (OUTPATIENT)
Dept: FAMILY MEDICINE CLINIC | Age: 82
End: 2022-11-03

## 2022-11-03 DIAGNOSIS — Z12.83 SKIN CANCER SCREENING: Primary | ICD-10-CM

## 2022-11-03 NOTE — TELEPHONE ENCOUNTER
Pt tried to make appointment for Vipul Tobar, Dermatology. First appointment is in June. Would like a referral to a Dermatologist in [place].

## 2022-11-04 ENCOUNTER — DOCUMENTATION ONLY (OUTPATIENT)
Dept: FAMILY MEDICINE CLINIC | Age: 82
End: 2022-11-04

## 2022-11-11 ENCOUNTER — OFFICE VISIT (OUTPATIENT)
Dept: FAMILY MEDICINE CLINIC | Age: 82
End: 2022-11-11
Payer: MEDICARE

## 2022-11-11 VITALS
TEMPERATURE: 98.1 F | HEART RATE: 71 BPM | RESPIRATION RATE: 18 BRPM | HEIGHT: 71 IN | OXYGEN SATURATION: 95 % | WEIGHT: 244.6 LBS | SYSTOLIC BLOOD PRESSURE: 128 MMHG | BODY MASS INDEX: 34.24 KG/M2 | DIASTOLIC BLOOD PRESSURE: 77 MMHG

## 2022-11-11 DIAGNOSIS — I25.10 CORONARY ARTERY DISEASE INVOLVING NATIVE CORONARY ARTERY OF NATIVE HEART WITHOUT ANGINA PECTORIS: ICD-10-CM

## 2022-11-11 DIAGNOSIS — I10 PRIMARY HYPERTENSION: Primary | ICD-10-CM

## 2022-11-11 DIAGNOSIS — I10 ESSENTIAL HYPERTENSION: ICD-10-CM

## 2022-11-11 DIAGNOSIS — E11.49 TYPE II OR UNSPECIFIED TYPE DIABETES MELLITUS WITH NEUROLOGICAL MANIFESTATIONS, NOT STATED AS UNCONTROLLED(250.60) (HCC): ICD-10-CM

## 2022-11-11 DIAGNOSIS — N40.1 BENIGN PROSTATIC HYPERPLASIA WITH WEAK URINARY STREAM: ICD-10-CM

## 2022-11-11 DIAGNOSIS — E78.2 MIXED DYSLIPIDEMIA: ICD-10-CM

## 2022-11-11 DIAGNOSIS — E03.9 ACQUIRED HYPOTHYROIDISM: ICD-10-CM

## 2022-11-11 DIAGNOSIS — I73.9 PERIPHERAL ARTERIAL DISEASE (HCC): ICD-10-CM

## 2022-11-11 DIAGNOSIS — I48.91 ATRIAL FIBRILLATION, UNSPECIFIED TYPE (HCC): ICD-10-CM

## 2022-11-11 DIAGNOSIS — F41.1 GAD (GENERALIZED ANXIETY DISORDER): ICD-10-CM

## 2022-11-11 DIAGNOSIS — J30.9 CHRONIC ALLERGIC RHINITIS: ICD-10-CM

## 2022-11-11 DIAGNOSIS — R39.12 BENIGN PROSTATIC HYPERPLASIA WITH WEAK URINARY STREAM: ICD-10-CM

## 2022-11-11 LAB
INR BLD: 2.7 (ref 2–3)
PT POC: 32.6 SECONDS (ref 10.4–14)
VALID INTERNAL CONTROL?: YES

## 2022-11-11 PROCEDURE — 85610 PROTHROMBIN TIME: CPT | Performed by: FAMILY MEDICINE

## 2022-11-11 PROCEDURE — 99214 OFFICE O/P EST MOD 30 MIN: CPT | Performed by: FAMILY MEDICINE

## 2022-11-11 PROCEDURE — 36415 COLL VENOUS BLD VENIPUNCTURE: CPT | Performed by: FAMILY MEDICINE

## 2022-11-11 RX ORDER — WARFARIN SODIUM 5 MG/1
TABLET ORAL
Qty: 90 TABLET | Refills: 0
Start: 2022-11-11

## 2022-11-11 RX ORDER — ATENOLOL 25 MG/1
40 TABLET ORAL DAILY
Qty: 135 TABLET | Refills: 5
Start: 2022-11-11

## 2022-11-11 NOTE — PROGRESS NOTES
Robert Suarez is a [de-identified] y.o. male who presents with the following:  Chief Complaint   Patient presents with    Hypertension    Heart Problem    Diabetes    Cholesterol Problem    Anxiety    Allergic Rhinitis    Benign Prostatic Hypertrophy    Thyroid Problem       Patient states his hypertension has been doing well on current medications without any chest pain shortness of breath nor edema but he states when he is doing work outside he develops a sharp pain just to the left of the sternum which is tender to pressure applied in that area. The patient's atrial fibrillation is not giving him any problems at this time and he is not aware of any unusual palpitations. Patient does have coronary artery disease but again is having no chest pain but because of the chest wall pain he is seeing a cardiologist.  Patient also has peripheral arterial disease but is not having any claudication. Patient is hypothyroid by history but is taking no thyroid replacement at this time. Patient denies any fatigue or tiredness and he is not cold intolerant. Patient does have BPH and has had some trouble with frequency and a smaller stream which has been helped by the tamsulosin. The patient generalized anxiety is doing fairly well on current medications and he can function without interference from anxiety but he is still missing his wife and still grieving her loss. 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 Patient does have dyslipidemia which is being treated without any muscle pain or weakness. Patient has type 2 diabetes but is having no polyuria nor abnormal weight loss no vision problems nor numbness or tingling. Allergies   Allergen Reactions    Ace Inhibitors Cough    Bupivacaine Hives, Rash, Itching and Other (comments)     Wheezing       Current Outpatient Medications   Medication Sig    atenoloL (TENORMIN) 25 mg tablet Take 1.5 Tablets by mouth daily.     warfarin (COUMADIN) 5 mg tablet TAKE 1 TABLET BY MOUTH ON MONDAY,TUESDAY,FRIDAY AND SUNDAY. TAKE 1/2 TABLET ON WEDNESDAY AND FRIDAY AND SATURDAY    gemfibroziL (LOPID) 600 mg tablet Take 1 tablet by mouth twice daily    tamsulosin (FLOMAX) 0.4 mg capsule Take 1 capsule by mouth once daily    citalopram (CELEXA) 20 mg tablet Take 1 tablet by mouth once daily    pantoprazole (PROTONIX) 40 mg tablet TAKE 1 TABLET BY MOUTH ONCE DAILY IN THE EVENING    losartan (COZAAR) 50 mg tablet Take 1 tablet by mouth once daily    albuterol (PROVENTIL HFA, VENTOLIN HFA, PROAIR HFA) 90 mcg/actuation inhaler Take 1 Puff by inhalation every six (6) hours as needed for Wheezing. ipratropium (ATROVENT) 42 mcg (0.06 %) nasal spray 1 [place] by Both Nostrils route four (4) times daily. Indications: runny nose    lovastatin (MEVACOR) 40 mg tablet Take 1 Tablet by mouth nightly. multivitamin (ONE A DAY) tablet Take 1 Tablet by mouth daily. No current facility-administered medications for this visit. Past Medical History:   Diagnosis Date    Adverse effect of anesthesia     Could not seat LMA's x2 sizes due to leak. no trouble with intubation 2/2106    Arrhythmia     h/o A-Fib    Arrhythmia     SSS    Arthritis     Asthma     CAD (coronary artery disease)     h/o stents    Diabetes ([facility] [address]     borderline per pt. 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 after 2/2016 shoulder surgery, pt reports that he had to see ENT.   pt states he was told that vocal cords were \"bruised\"  pt states at this time he is ~ 50% improved. 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       Family History   Problem Relation Age of Onset    Heart Disease Mother     Heart Disease Father     Cancer Father         prostate    Other Father         mersa sepsis       Social History     Socioeconomic History    Marital status:    Tobacco Use    Smoking status: Former     Packs/day: 2.00     Years: 19.00     Pack years: 38.00     Types: Cigarettes     Start date: 1966     Quit date: 1979     Years since quittin.8    Smokeless tobacco: Never   Vaping Use    Vaping Use: Never used   Substance and Sexual Activity    Alcohol use: Yes     Comment: few drinks per year    Drug use: No    Sexual activity: Not Currently     Social Determinants of Health     Financial Resource Strain: Low Risk     Difficulty of Paying Living Expenses: Not hard at all   Food Insecurity: No Food Insecurity    Worried About Running Out of Food in the Last Year: Never true    Ran Out of Food in the Last Year: Never true       Review of Systems   Constitutional:  Negative for chills, fever, malaise/fatigue and weight loss. HENT:  Negative for congestion, hearing loss, sore throat and tinnitus. Eyes:  Negative for blurred vision, pain and discharge. Respiratory:  Negative for cough, shortness of breath and wheezing. Cardiovascular:  Negative for chest pain, palpitations, orthopnea, claudication and leg swelling. Patient has chest wall pain on the left consistent with costochondritis. Gastrointestinal:  Negative for abdominal pain, constipation and heartburn. Genitourinary:  Negative for dysuria, frequency and urgency.    Musculoskeletal:  Negative for falls, joint pain and myalgias. Skin:  Negative for itching and rash. Neurological:  Negative for dizziness, tingling, tremors and headaches. Endo/Heme/Allergies:  Negative for environmental allergies and polydipsia. Psychiatric/Behavioral:  Negative for depression and substance abuse. The patient is not nervous/anxious. Visit Vitals  /77 (BP 1 Location: Left arm)   Pulse 71   Temp 98.1 °F (36.7 °C)   Resp 18   Ht 5' 11\" (1.803 m)   Wt 244 lb 9.6 oz (110.9 kg)   SpO2 95%   BMI 34.11 kg/m²     Physical Exam  Vitals reviewed. Constitutional:       General: He is not in acute distress. Appearance: Normal appearance. He is well-developed. He is not toxic-appearing. HENT:      Head: Normocephalic and atraumatic. Right Ear: Tympanic membrane, ear canal and external ear normal.      Left Ear: Tympanic membrane, ear canal and external ear normal.      Nose: Nose normal. No congestion or rhinorrhea. Mouth/Throat:      Mouth: Mucous membranes are moist.      Pharynx: No oropharyngeal exudate or posterior oropharyngeal erythema. Eyes:      General:         Right eye: No discharge. Left eye: No discharge. Extraocular Movements: Extraocular movements intact. Conjunctiva/sclera: Conjunctivae normal.      Pupils: Pupils are equal, round, and reactive to light. Comments: Pupil iris and anterior chamber normal.   Neck:      Trachea: No tracheal deviation. Cardiovascular:      Rate and Rhythm: Normal rate and regular rhythm. Heart sounds: Normal heart sounds. No murmur heard. No friction rub. No gallop. Comments: Pulses are decreased in all 4 extremities but still palpable. Pulmonary:      Effort: Pulmonary effort is normal. No respiratory distress. Breath sounds: Normal breath sounds. No wheezing, rhonchi or rales. Chest:      Chest wall: No tenderness. Abdominal:      General: Bowel sounds are normal. There is no distension. Palpations: Abdomen is soft.  There is no mass. Tenderness: There is no abdominal tenderness. There is no guarding or rebound. Hernia: No hernia is present. Musculoskeletal:         General: No swelling or tenderness. Normal range of motion. Cervical back: Normal range of motion and neck supple. Right lower leg: No edema. Left lower leg: No edema. Lymphadenopathy:      Cervical: No cervical adenopathy. Skin:     General: Skin is warm and dry. Coloration: Skin is not pale. Findings: No erythema or rash. Neurological:      General: No focal deficit present. Mental Status: He is alert and oriented to person, place, and time. Cranial Nerves: No cranial nerve deficit. Sensory: No sensory deficit. Motor: No abnormal muscle tone. Deep Tendon Reflexes: Reflexes are normal and symmetric. Reflexes normal.      Comments: Cranial nerves II through XII intact sensory and motor. Deep tendon reflexes in the biceps triceps knee and ankle are normal and bilaterally symmetrical.   Psychiatric:         Mood and Affect: Mood normal.         Behavior: Behavior normal.         Thought Content: Thought content normal.         Judgment: Judgment normal.         ICD-10-CM ICD-9-CM    1. Primary hypertension  I10 401.9       2. Coronary artery disease involving native coronary artery of native heart without angina pectoris  I25.10 414.01       3. Atrial fibrillation, unspecified type (HCC)  I48.91 427.31 AMB POC PT/INR      COLLECTION VENOUS BLOOD,VENIPUNCTURE      warfarin (COUMADIN) 5 mg tablet      4. Peripheral arterial disease (HCC)  I73.9 443.9       5. Acquired hypothyroidism  E03.9 244.9 TSH 3RD GENERATION      6. Benign prostatic hyperplasia with weak urinary stream  N40.1 600.01     R39.12 788.62       7. Chronic allergic rhinitis  J30.9 477.9       8.  Type II or unspecified type diabetes mellitus with neurological manifestations, not stated as uncontrolled(250.60) ([facility] [address]  E11.49 250.60 LIPID PANEL METABOLIC PANEL, COMPREHENSIVE      HEMOGLOBIN A1C WITH EAG      MICROALBUMIN, UR, RAND W/ MICROALB/CREAT RATIO      MICROALBUMIN, UR, RAND W/ MICROALB/CREAT RATIO      HEMOGLOBIN A1C WITH EAG      METABOLIC PANEL, COMPREHENSIVE      LIPID PANEL      9. Mixed dyslipidemia  E78.2 272.2       10. ALEJANDRO (generalized anxiety disorder)  F41.1 300.02       11. Essential hypertension  I10 401.9 atenoloL (TENORMIN) 25 mg tablet          Orders Placed This Encounter    LIPID PANEL     Standing Status:   Future     Number of Occurrences:   1     Standing Expiration Date:   14/39/7154    METABOLIC PANEL, COMPREHENSIVE     Standing Status:   Future     Number of Occurrences:   1     Standing Expiration Date:   12/11/2022    HEMOGLOBIN A1C WITH EAG     Standing Status:   Future     Number of Occurrences:   1     Standing Expiration Date:   12/11/2022    MICROALBUMIN, UR, RAND W/ MICROALB/CREAT RATIO     Standing Status:   Future     Number of Occurrences:   1     Standing Expiration Date:   12/11/2022    TSH 3RD GENERATION     Standing Status:   Future     Standing Expiration Date:   11/11/2023    AMB POC PT/INR    COLLECTION VENOUS BLOOD,VENIPUNCTURE    atenoloL (TENORMIN) 25 mg tablet     Sig: Take 1.5 Tablets by mouth daily. Dispense:  135 Tablet     Refill:  5    warfarin (COUMADIN) 5 mg tablet     Sig: TAKE 1 TABLET BY MOUTH ON MONDAY,TUESDAY,FRIDAY AND SUNDAY. TAKE 1/2 TABLET ON WEDNESDAY AND FRIDAY AND SATURDAY     Dispense:  90 Tablet     Refill:  0       Follow-up and Dispositions    Return in about 4 weeks (around 12/9/2022) for For INR check and a another exam in 6 months.          Candelario Milligan MD

## 2022-11-11 NOTE — PROGRESS NOTES
1. \"Have you been to the ER, urgent care clinic since your last visit? Hospitalized since your last visit? \" No    2. \"Have you seen or consulted any other health care providers outside of the [address] [place] [address] since your last visit? \" No     3. For patients aged 39-70: Has the patient had a colonoscopy / FIT/ Cologuard? No     If the patient is female:    4. For patients aged 41-77: Has the patient had a mammogram within the past 2 years? NA - based on age    11. For patients aged 21-65: Has the patient had a pap smear?  NA - based on age

## 2022-11-12 LAB
ALBUMIN SERPL-MCNC: 3.9 G/DL (ref 3.5–5)
ALBUMIN/GLOB SERPL: 1.1 {RATIO} (ref 1.1–2.2)
ALP SERPL-CCNC: 94 U/L (ref 45–117)
ALT SERPL-CCNC: 25 U/L (ref 12–78)
ANION GAP SERPL CALC-SCNC: 4 MMOL/L (ref 5–15)
AST SERPL-CCNC: 20 U/L (ref 15–37)
BILIRUB SERPL-MCNC: 0.5 MG/DL (ref 0.2–1)
BUN SERPL-MCNC: 15 MG/DL (ref 6–20)
BUN/CREAT SERPL: 16 (ref 12–20)
CALCIUM SERPL-MCNC: 9 MG/DL (ref 8.5–10.1)
CHLORIDE SERPL-SCNC: 110 MMOL/L (ref 97–108)
CHOLEST SERPL-MCNC: 197 MG/DL
CO2 SERPL-SCNC: 25 MMOL/L (ref 21–32)
CREAT SERPL-MCNC: 0.94 MG/DL (ref 0.7–1.3)
CREAT UR-MCNC: 101 MG/DL
EST. AVERAGE GLUCOSE BLD GHB EST-MCNC: 128 MG/DL
GLOBULIN SER CALC-MCNC: 3.5 G/DL (ref 2–4)
GLUCOSE SERPL-MCNC: 96 MG/DL (ref 65–100)
HBA1C MFR BLD: 6.1 % (ref 4–5.6)
HDLC SERPL-MCNC: 22 MG/DL
HDLC SERPL: 9 {RATIO} (ref 0–5)
LDLC SERPL CALC-MCNC: 145.2 MG/DL (ref 0–100)
MICROALBUMIN UR-MCNC: 0.51 MG/DL
MICROALBUMIN/CREAT UR-RTO: 5 MG/G (ref 0–30)
POTASSIUM SERPL-SCNC: 4.3 MMOL/L (ref 3.5–5.1)
PROT SERPL-MCNC: 7.4 G/DL (ref 6.4–8.2)
SODIUM SERPL-SCNC: 139 MMOL/L (ref 136–145)
TRIGL SERPL-MCNC: 149 MG/DL (ref ?–150)
TSH SERPL DL<=0.05 MIU/L-ACNC: 1.45 UIU/ML (ref 0.36–3.74)
VLDLC SERPL CALC-MCNC: 29.8 MG/DL

## 2022-12-03 DIAGNOSIS — I10 ESSENTIAL HYPERTENSION: ICD-10-CM

## 2022-12-03 DIAGNOSIS — K21.9 GASTROESOPHAGEAL REFLUX DISEASE WITHOUT ESOPHAGITIS: ICD-10-CM

## 2022-12-05 RX ORDER — PANTOPRAZOLE SODIUM 40 MG/1
TABLET, DELAYED RELEASE ORAL
Qty: 90 TABLET | Refills: 0 | Status: SHIPPED | OUTPATIENT
Start: 2022-12-05

## 2022-12-05 RX ORDER — LOSARTAN POTASSIUM 50 MG/1
TABLET ORAL
Qty: 90 TABLET | Refills: 0 | Status: SHIPPED | OUTPATIENT
Start: 2022-12-05

## 2022-12-09 ENCOUNTER — OFFICE VISIT (OUTPATIENT)
Dept: FAMILY MEDICINE CLINIC | Age: 82
End: 2022-12-09
Payer: MEDICARE

## 2022-12-09 VITALS
BODY MASS INDEX: 33.61 KG/M2 | DIASTOLIC BLOOD PRESSURE: 62 MMHG | HEART RATE: 83 BPM | RESPIRATION RATE: 16 BRPM | TEMPERATURE: 97.9 F | WEIGHT: 241 LBS | SYSTOLIC BLOOD PRESSURE: 118 MMHG | OXYGEN SATURATION: 95 %

## 2022-12-09 DIAGNOSIS — I73.9 PERIPHERAL ARTERY DISEASE (HCC): ICD-10-CM

## 2022-12-09 DIAGNOSIS — Z79.01 ANTICOAGULATION ADEQUATE: ICD-10-CM

## 2022-12-09 DIAGNOSIS — I48.20 CHRONIC ATRIAL FIBRILLATION (HCC): Primary | ICD-10-CM

## 2022-12-09 LAB
INR BLD: 2.3
PT POC: 28 SECONDS
VALID INTERNAL CONTROL?: YES

## 2022-12-09 PROCEDURE — G8510 SCR DEP NEG, NO PLAN REQD: HCPCS | Performed by: FAMILY MEDICINE

## 2022-12-09 PROCEDURE — 36416 COLLJ CAPILLARY BLOOD SPEC: CPT | Performed by: FAMILY MEDICINE

## 2022-12-09 PROCEDURE — 1101F PT FALLS ASSESS-DOCD LE1/YR: CPT | Performed by: FAMILY MEDICINE

## 2022-12-09 PROCEDURE — G8427 DOCREV CUR MEDS BY ELIG CLIN: HCPCS | Performed by: FAMILY MEDICINE

## 2022-12-09 PROCEDURE — 85610 PROTHROMBIN TIME: CPT | Performed by: FAMILY MEDICINE

## 2022-12-09 PROCEDURE — G8752 SYS BP LESS 140: HCPCS | Performed by: FAMILY MEDICINE

## 2022-12-09 PROCEDURE — 99213 OFFICE O/P EST LOW 20 MIN: CPT | Performed by: FAMILY MEDICINE

## 2022-12-09 PROCEDURE — 1123F ACP DISCUSS/DSCN MKR DOCD: CPT | Performed by: FAMILY MEDICINE

## 2022-12-09 PROCEDURE — 3074F SYST BP LT 130 MM HG: CPT | Performed by: FAMILY MEDICINE

## 2022-12-09 PROCEDURE — G8536 NO DOC ELDER MAL SCRN: HCPCS | Performed by: FAMILY MEDICINE

## 2022-12-09 PROCEDURE — 3078F DIAST BP <80 MM HG: CPT | Performed by: FAMILY MEDICINE

## 2022-12-09 PROCEDURE — G8417 CALC BMI ABV UP PARAM F/U: HCPCS | Performed by: FAMILY MEDICINE

## 2022-12-09 PROCEDURE — G8754 DIAS BP LESS 90: HCPCS | Performed by: FAMILY MEDICINE

## 2022-12-09 NOTE — PROGRESS NOTES
Carol Lombard is a [de-identified] y.o. male who presents with the following:  Chief Complaint   Patient presents with    Heart Problem     Follow-up       Patient has chronic atrial fibrillation and peripheral artery disease and is on chronic anticoagulation with warfarin without any excessive bruising or bleeding. Patient also has nonsymptomatic carotid stenosis. Patient stated originally he was living [de-identified] getting down but now he is becoming more physically active and feeling much better. Allergies   Allergen Reactions    Ace Inhibitors Cough    Bupivacaine Hives, Rash, Itching and Other (comments)     Wheezing       Current Outpatient Medications   Medication Sig    pantoprazole (PROTONIX) 40 mg tablet TAKE 1 TABLET BY MOUTH ONCE DAILY IN THE EVENING    losartan (COZAAR) 50 mg tablet Take 1 tablet by mouth once daily    atenoloL (TENORMIN) 25 mg tablet Take 1.5 Tablets by mouth daily. warfarin (COUMADIN) 5 mg tablet TAKE 1 TABLET BY MOUTH ON MONDAY,TUESDAY,FRIDAY AND SUNDAY. TAKE 1/2 TABLET ON WEDNESDAY AND FRIDAY AND SATURDAY    gemfibroziL (LOPID) 600 mg tablet Take 1 tablet by mouth twice daily    tamsulosin (FLOMAX) 0.4 mg capsule Take 1 capsule by mouth once daily    citalopram (CELEXA) 20 mg tablet Take 1 tablet by mouth once daily    albuterol (PROVENTIL HFA, VENTOLIN HFA, PROAIR HFA) 90 mcg/actuation inhaler Take 1 Puff by inhalation every six (6) hours as needed for Wheezing. ipratropium (ATROVENT) 42 mcg (0.06 %) nasal spray 1 [place] by Both Nostrils route four (4) times daily. Indications: runny nose    lovastatin (MEVACOR) 40 mg tablet Take 1 Tablet by mouth nightly. multivitamin (ONE A DAY) tablet Take 1 Tablet by mouth daily. No current facility-administered medications for this visit. Past Medical History:   Diagnosis Date    Adverse effect of anesthesia     Could not seat LMA's x2 sizes due to leak.   no trouble with intubation 2/2106    Arrhythmia     h/o A-Fib    Arrhythmia     SSS Arthritis     Asthma     CAD (coronary artery disease)     h/o stents    Diabetes ([facility] [address]     borderline per pt. 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 after 2016 shoulder surgery, pt reports that he had to see ENT. pt states he was told that vocal cords were \"bruised\"  pt states at this time he is ~ 50% improved.        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       Family History   Problem Relation Age of Onset    Heart Disease Mother     Heart Disease Father     Cancer Father         prostate    Other Father         mersa sepsis       Social History     Socioeconomic History    Marital status:    Tobacco Use    Smoking status: Former     Packs/day: 2.00     Years: 19.00     Pack years: 38.00     Types: Cigarettes     Start date: 1966     Quit date: 1979     Years since quittin.9    Smokeless tobacco: Never   Vaping Use    Vaping Use: Never used   Substance and Sexual Activity    Alcohol use: Yes     Comment: few drinks per year    Drug use: No    Sexual activity: Not Currently     Social Determinants of Health     Financial Resource Strain: Low Risk     Difficulty of Paying Living Expenses: Not hard at all   Food Insecurity: No Food Insecurity    Worried About [address] in the Last Year: Never true    Ran Out of Food in the Last Year: Never true       Review of Systems   Constitutional:  Negative for chills, fever, malaise/fatigue and weight loss. HENT:  Negative for congestion, hearing loss, sore throat and tinnitus. Eyes:  Negative for blurred vision, pain and discharge. Respiratory:  Negative for cough, shortness of breath and wheezing. Cardiovascular:  Negative for chest pain, palpitations, orthopnea, claudication and leg swelling. Gastrointestinal:  Negative for abdominal pain, constipation and heartburn. Genitourinary:  Negative for dysuria, frequency and urgency. Musculoskeletal:  Negative for falls, joint pain and myalgias. Skin:  Negative for itching and rash. Neurological:  Negative for dizziness, tingling, tremors and headaches. Endo/Heme/Allergies:  Negative for environmental allergies and polydipsia. Psychiatric/Behavioral:  Negative for depression and substance abuse. The patient is not nervous/anxious. Visit Vitals  /62 (BP 1 Location: Left upper arm)   Pulse 83   Temp 97.9 °F (36.6 °C) (Oral)   Resp 16   Wt 241 lb (109.3 kg)   SpO2 95%   BMI 33.61 kg/m²     Physical Exam  Vitals reviewed. Constitutional:       General: He is not in acute distress. Appearance: Normal appearance. He is well-developed. He is not toxic-appearing. HENT:      Head: Normocephalic and atraumatic. Right Ear: Tympanic membrane, ear canal and external ear normal.      Left Ear: Tympanic membrane, ear canal and external ear normal.      Nose: Nose normal. No congestion or rhinorrhea. Mouth/Throat:      Mouth: Mucous membranes are moist.      Pharynx: No oropharyngeal exudate or posterior oropharyngeal erythema. Eyes:      General:         Right eye: No discharge. Left eye: No discharge. Extraocular Movements: Extraocular movements intact.       Conjunctiva/sclera: Conjunctivae normal.      Pupils: Pupils are equal, round, and reactive to light. Comments: Pupil iris and anterior chamber normal.   Neck:      Trachea: No tracheal deviation. Cardiovascular:      Rate and Rhythm: Normal rate and regular rhythm. Pulses: Normal pulses. Heart sounds: Normal heart sounds. No murmur heard. No friction rub. No gallop. Pulmonary:      Effort: Pulmonary effort is normal. No respiratory distress. Breath sounds: Normal breath sounds. No wheezing, rhonchi or rales. Chest:      Chest wall: No tenderness. Abdominal:      General: Bowel sounds are normal. There is no distension. Palpations: Abdomen is soft. There is no mass. Tenderness: There is no abdominal tenderness. There is no guarding or rebound. Hernia: No hernia is present. Musculoskeletal:         General: No swelling or tenderness. Normal range of motion. Cervical back: Normal range of motion and neck supple. Lymphadenopathy:      Cervical: No cervical adenopathy. Skin:     General: Skin is warm and dry. Coloration: Skin is not pale. Findings: No erythema or rash. Neurological:      General: No focal deficit present. Mental Status: He is alert and oriented to person, place, and time. Cranial Nerves: No cranial nerve deficit. Sensory: No sensory deficit. Motor: No abnormal muscle tone. Deep Tendon Reflexes: Reflexes are normal and symmetric. Reflexes normal.      Comments: Cranial nerves II through XII intact sensory and motor. Deep tendon reflexes in the biceps triceps knee and ankle are normal and bilaterally symmetrical.   Psychiatric:         Mood and Affect: Mood normal.         Behavior: Behavior normal.         Thought Content: Thought content normal.         Judgment: Judgment normal.         ICD-10-CM ICD-9-CM    1. Chronic atrial fibrillation (HCC)  I48.20 427.31 AMB POC PT/INR      2. Peripheral artery disease (HCC)  I73.9 443.9 AMB POC PT/INR      3.  Anticoagulation adequate  Z79.01 V58.61 AMB POC PT/INR          Orders Placed This Encounter    AMB POC PT/INR       Follow-up and Dispositions    Return in about 4 weeks (around 1/6/2023), or if symptoms worsen or fail to improve.          Candelario Milligan MD

## 2022-12-09 NOTE — PROGRESS NOTES
Identified pt with two pt identifiers(name and ). Reviewed record in preparation for visit and have obtained necessary documentation. Chief Complaint   Patient presents with    Heart Problem     Follow-up       1. \"Have you been to the ER, urgent care clinic since your last visit? Hospitalized since your last visit? \" No    2. \"Have you seen or consulted any other health care providers outside of the [address] [place] [address] since your last visit? \" No     3. For patients aged 39-70: Has the patient had a colonoscopy / FIT/ Cologuard? NA - based on age      If the patient is female:    4. For patients aged 41-77: Has the patient had a mammogram within the past 2 years? NA - based on age or sex      11. For patients aged 21-65: Has the patient had a pap smear?  NA - based on age or sex

## 2022-12-19 ENCOUNTER — TRANSCRIBE ORDER (OUTPATIENT)
Dept: REGISTRATION | Age: 82
End: 2022-12-19

## 2022-12-19 ENCOUNTER — HOSPITAL ENCOUNTER (OUTPATIENT)
Dept: LAB | Age: 82
Discharge: HOME | End: 2022-12-19
Payer: MEDICARE

## 2022-12-19 ENCOUNTER — HOSPITAL ENCOUNTER (OUTPATIENT)
Dept: GENERAL RADIOLOGY | Age: 82
Discharge: HOME | End: 2022-12-19
Payer: MEDICARE

## 2022-12-19 DIAGNOSIS — Z45.010 ENCOUNTER FOR PACEMAKER AT END OF BATTERY LIFE: ICD-10-CM

## 2022-12-19 DIAGNOSIS — I25.10 CORONARY ATHEROSCLEROSIS OF NATIVE CORONARY ARTERY: ICD-10-CM

## 2022-12-19 DIAGNOSIS — Z45.010 ENCOUNTER FOR PACEMAKER AT END OF BATTERY LIFE: Primary | ICD-10-CM

## 2022-12-19 LAB
ANION GAP SERPL CALC-SCNC: 9 MMOL/L (ref 5–15)
BASOPHILS # BLD: 0 K/UL (ref 0–0.1)
BASOPHILS NFR BLD: 1 % (ref 0–1)
BUN SERPL-MCNC: 15 MG/DL (ref 6–20)
BUN/CREAT SERPL: 13 (ref 12–20)
CALCIUM SERPL-MCNC: 8.8 MG/DL (ref 8.5–10.1)
CHLORIDE SERPL-SCNC: 104 MMOL/L (ref 97–108)
CO2 SERPL-SCNC: 24 MMOL/L (ref 21–32)
CREAT SERPL-MCNC: 1.16 MG/DL (ref 0.7–1.3)
DIFFERENTIAL METHOD BLD: NORMAL
EOSINOPHIL # BLD: 0.3 K/UL (ref 0–0.4)
EOSINOPHIL NFR BLD: 6 % (ref 0–7)
ERYTHROCYTE [DISTWIDTH] IN BLOOD BY AUTOMATED COUNT: 13.8 % (ref 11.5–14.5)
GLUCOSE SERPL-MCNC: 157 MG/DL (ref 65–100)
HCT VFR BLD AUTO: 38.2 % (ref 36.6–50.3)
HGB BLD-MCNC: 13.3 G/DL (ref 12.1–17)
IMM GRANULOCYTES # BLD AUTO: 0 K/UL (ref 0–0.04)
IMM GRANULOCYTES NFR BLD AUTO: 0 % (ref 0–0.5)
INR PPP: 1.8 (ref 0.9–1.1)
LYMPHOCYTES # BLD: 1 K/UL (ref 0.8–3.5)
LYMPHOCYTES NFR BLD: 24 % (ref 12–49)
MCH RBC QN AUTO: 31.3 PG (ref 26–34)
MCHC RBC AUTO-ENTMCNC: 34.8 G/DL (ref 30–36.5)
MCV RBC AUTO: 89.9 FL (ref 80–99)
MONOCYTES # BLD: 0.6 K/UL (ref 0–1)
MONOCYTES NFR BLD: 13 % (ref 5–13)
NEUTS SEG # BLD: 2.4 K/UL (ref 1.8–8)
NEUTS SEG NFR BLD: 56 % (ref 32–75)
NRBC # BLD: 0 K/UL (ref 0–0.01)
NRBC BLD-RTO: 0 PER 100 WBC
PLATELET # BLD AUTO: 227 K/UL (ref 150–400)
PMV BLD AUTO: 10.7 FL (ref 8.9–12.9)
POTASSIUM SERPL-SCNC: 3.9 MMOL/L (ref 3.5–5.1)
PROTHROMBIN TIME: 17.8 SEC (ref 9–11.1)
RBC # BLD AUTO: 4.25 M/UL (ref 4.1–5.7)
SODIUM SERPL-SCNC: 137 MMOL/L (ref 136–145)
WBC # BLD AUTO: 4.3 K/UL (ref 4.1–11.1)

## 2022-12-19 PROCEDURE — 85610 PROTHROMBIN TIME: CPT

## 2022-12-19 PROCEDURE — 36415 COLL VENOUS BLD VENIPUNCTURE: CPT

## 2022-12-19 PROCEDURE — 71046 X-RAY EXAM CHEST 2 VIEWS: CPT

## 2022-12-19 PROCEDURE — 80048 BASIC METABOLIC PNL TOTAL CA: CPT

## 2022-12-19 PROCEDURE — 85025 COMPLETE CBC W/AUTO DIFF WBC: CPT

## 2023-01-05 ENCOUNTER — HOSPITAL ENCOUNTER (OUTPATIENT)
Age: 83
Setting detail: OUTPATIENT SURGERY
Discharge: HOME | End: 2023-01-05
Attending: INTERNAL MEDICINE | Admitting: INTERNAL MEDICINE
Payer: MEDICARE

## 2023-01-05 VITALS
DIASTOLIC BLOOD PRESSURE: 81 MMHG | WEIGHT: 230 LBS | HEIGHT: 71 IN | OXYGEN SATURATION: 97 % | HEART RATE: 75 BPM | BODY MASS INDEX: 32.2 KG/M2 | RESPIRATION RATE: 17 BRPM | TEMPERATURE: 98.3 F | SYSTOLIC BLOOD PRESSURE: 158 MMHG

## 2023-01-05 DIAGNOSIS — Z45.010 CARDIAC PACEMAKER BATTERY WORN OUT: ICD-10-CM

## 2023-01-05 LAB — INR BLD: 1.3 (ref 0.9–1.2)

## 2023-01-05 PROCEDURE — 33228 REMV&REPLC PM GEN DUAL LEAD: CPT | Performed by: INTERNAL MEDICINE

## 2023-01-05 PROCEDURE — 77030028698 HC BLD TISS PLSM MEDT -D: Performed by: INTERNAL MEDICINE

## 2023-01-05 PROCEDURE — C1781 MESH (IMPLANTABLE): HCPCS | Performed by: INTERNAL MEDICINE

## 2023-01-05 PROCEDURE — 74011250636 HC RX REV CODE- 250/636: Performed by: INTERNAL MEDICINE

## 2023-01-05 PROCEDURE — 99152 MOD SED SAME PHYS/QHP 5/>YRS: CPT | Performed by: INTERNAL MEDICINE

## 2023-01-05 PROCEDURE — 77030031139 HC SUT VCRL2 J&J -A: Performed by: INTERNAL MEDICINE

## 2023-01-05 PROCEDURE — 99153 MOD SED SAME PHYS/QHP EA: CPT | Performed by: INTERNAL MEDICINE

## 2023-01-05 PROCEDURE — C1785 PMKR, DUAL, RATE-RESP: HCPCS | Performed by: INTERNAL MEDICINE

## 2023-01-05 PROCEDURE — 74011000272 HC RX REV CODE- 272: Performed by: INTERNAL MEDICINE

## 2023-01-05 PROCEDURE — 85610 PROTHROMBIN TIME: CPT

## 2023-01-05 PROCEDURE — 74011000250 HC RX REV CODE- 250: Performed by: INTERNAL MEDICINE

## 2023-01-05 PROCEDURE — 77030002996 HC SUT SLK J&J -A: Performed by: INTERNAL MEDICINE

## 2023-01-05 DEVICE — PCMKR DUAL CHMBR DR RF -- ASSURITY MRI: Type: IMPLANTABLE DEVICE | Status: FUNCTIONAL

## 2023-01-05 DEVICE — ENVELOPE CMRM6133 ABSORB LRG MR
Type: IMPLANTABLE DEVICE | Status: FUNCTIONAL
Brand: TYRX™

## 2023-01-05 RX ORDER — LIDOCAINE HYDROCHLORIDE AND EPINEPHRINE 10; 10 MG/ML; UG/ML
INJECTION, SOLUTION INFILTRATION; PERINEURAL AS NEEDED
Status: DISCONTINUED | OUTPATIENT
Start: 2023-01-05 | End: 2023-01-05 | Stop reason: HOSPADM

## 2023-01-05 RX ORDER — HEPARIN SODIUM 200 [USP'U]/100ML
INJECTION, SOLUTION INTRAVENOUS
Status: DISCONTINUED | OUTPATIENT
Start: 2023-01-05 | End: 2023-01-05 | Stop reason: HOSPADM

## 2023-01-05 RX ORDER — CEFAZOLIN SODIUM/WATER 2 G/20 ML
SYRINGE (ML) INTRAVENOUS AS NEEDED
Status: DISCONTINUED | OUTPATIENT
Start: 2023-01-05 | End: 2023-01-05 | Stop reason: HOSPADM

## 2023-01-05 RX ORDER — FENTANYL CITRATE 50 UG/ML
INJECTION, SOLUTION INTRAMUSCULAR; INTRAVENOUS AS NEEDED
Status: DISCONTINUED | OUTPATIENT
Start: 2023-01-05 | End: 2023-01-05 | Stop reason: HOSPADM

## 2023-01-05 RX ORDER — CEPHALEXIN 500 MG/1
500 CAPSULE ORAL 4 TIMES DAILY
Qty: 20 CAPSULE | Refills: 0 | OUTPATIENT
Start: 2023-01-05 | End: 2023-01-10

## 2023-01-05 RX ORDER — MIDAZOLAM HYDROCHLORIDE 1 MG/ML
INJECTION, SOLUTION INTRAMUSCULAR; INTRAVENOUS AS NEEDED
Status: DISCONTINUED | OUTPATIENT
Start: 2023-01-05 | End: 2023-01-05 | Stop reason: HOSPADM

## 2023-01-05 NOTE — DISCHARGE INSTRUCTIONS
Cardiology Discharge Summary     Patient ID:  Vinicio Rios  [id number]  57 y.o.  1940    Admit Date: 1/5/2023    Discharge Date: 1/5/2023         Patient Instructions:       Referenced discharge instructions provided by nursing for diet and activity. Follow-up with Dr Celso Chakraborty nurse in 3-4 weeks for incision check [phone]    Resume warfarin Saturday    Signed:  Solitario Canales MD  1/5/2023  4:19 PM   DISCHARGE INSTRUCTIONS FOR PATIENTS WITH PACEMAKERS    1. Remember to call for an appointment in 3-4 weeks [phone] to check healing and implant programming. 2. Medic Alert Bracelets are available from your pharmacist to wear at all times if you choose to wear one. 3. Carry your ID card for pacemaker with you at all times. This card will be given to you in the hospital or mailed to you. 4. The pacemaker will bulge slightly under your skin. The bulge will decrease in size over the next few weeks. Please notify the doctor's office if you notice any of the following around your site:   A.  A bruise that does not go away. B.  Soreness or yellow, green, or brown drainage from the site. C. Any swelling from the site. D. If you have a fever of 100 degrees or higher that lasts for a few days. INCISION CARE       1.  Leave paper strips over your site until it starts to fall off, usually in a few weeks. 2.  You may shower after 3 days as long as your incision isnt submerged or directly sprayed upon until well healed. 3.  For comfort, wear loose fitting clothing. 4.  Report any signs of infection, fever, pain, swelling, redness, oozing, or heat at site especially if these symptoms increase after the first 3 to 4 days. ACTIVITY PRECAUTIONS     1. Avoid rough contact with the implant site. 2. No driving for 3 days. 3. Avoid lifting your arm over your head, carrying anything on the affected side, or lifting over 10 pounds for 10 days.    4. Any extreme activity such as golf, weight lifting or exercise biking should be restricted for 60 days. 5. Do not carry objects by holding them against your implant site. 6.  No shooting rifles or any type of gun with the affected shoulder permanently. SPECIAL PRECAUTIONS     1. You should avoid all strong magnetic fields, such as arc welding, large transformers, large motors. 2.  You may not have an MRI which uses a strong magnet to take pictures. 3.  Treatments or surgery that requires diathermy or electrocautery should be discussed with your doctor before scheduled. 4. Avoid radio frequency transmitters, including radar. 5. Advise dentist or other medical personnel you see that you have a pacemaker. 6.  Cell phones and microwave oven use is okay. 7.  If you plan to move or take a trip to a new area, the doctor's office will give you a name of a doctor to contact for any problems. ANTIBIOTIC THERAPY    During the first 8 weeks after your pacemaker insertion, you may need antibiotics before any dental work or certain tests or operations. Let the dentist or doctor who is caring for you know that you have had an implanted device. You will be given a prescription for a prophylactic antibiotic called cephalexin to take for a few days after your procedure.

## 2023-01-05 NOTE — PROGRESS NOTES
TRANSFER - IN REPORT:    Verbal report received from Brianna Means RN and Bret Qureshi RN on Ruth Deluca  being received from cath lab for routine progression of care. Report consisted of patients Situation, Background, Assessment and Recommendations(SBAR). Information from the following report(s) SBAR, Procedure Summary, MAR, and Recent Results was reviewed with the receiving clinician. Opportunity for questions and clarification was provided. Assessment completed upon patients arrival to [address] [place] [address] and care assumed. Cardiac Cath Lab Recovery Arrival Note:    Ruth Deluca arrived to [facility]3] recovery area. Patient procedure= generator change. Patient on cardiac monitor, non-invasive blood pressure, SPO2 monitor. On room air. Patient status doing well without problems. Patient is A&Ox 4. Patient reports no complaints. PROCEDURE SITE CHECK:    Procedure site:without any bleeding and no hematoma, no pain/discomfort reported at procedure site. No change in patient status. Continue to monitor patient and status.

## 2023-01-05 NOTE — PROGRESS NOTES
Patient ambulated to bathroom without assistance. Discharge instructions reviewed with patient and all questions and concerns were answered. Saline locked IV removed without complications. Patient wheeled to hospital entrance where his brother was waiting to drive home.

## 2023-01-05 NOTE — PROGRESS NOTES
Primary Nurse Violet Nieto RN performed a dual skin assessment on this patient No impairment noted  Arsenio score is 23 ; meplix on sacrum

## 2023-01-05 NOTE — PROGRESS NOTES
Cardiac Cath Lab Recovery Arrival Note:      Concetta Harris arrived to Cardiac Cath Lab, Recovery Area. Staff introduced to patient. Patient identifiers verified with NAME and DATE OF BIRTH. Procedure verified with patient. Consent forms reviewed and signed by patient or authorized representative and verified. Allergies verified. Patient and family oriented to department. Patient and family informed of procedure and plan of care. Questions answered with review. Patient prepped for procedure, per orders from physician, prior to arrival.    Patient on cardiac monitor, non-invasive blood pressure, SPO2 monitor. On RA. Patient is A&Ox 4 Patient reports no complaints. Patient in stretcher, in low position, with side rails up, call bell within reach, patient instructed to call if assistance as needed. Patient prep in: [address], [place] non invasive . Patient family has pager # none  Family in: brother in lobby.    Prep by: Vanessa Upton

## 2023-01-17 ENCOUNTER — OFFICE VISIT (OUTPATIENT)
Dept: FAMILY MEDICINE CLINIC | Age: 83
End: 2023-01-17
Payer: MEDICARE

## 2023-01-17 VITALS
BODY MASS INDEX: 33.88 KG/M2 | HEART RATE: 64 BPM | TEMPERATURE: 97.6 F | HEIGHT: 71 IN | RESPIRATION RATE: 20 BRPM | OXYGEN SATURATION: 94 % | DIASTOLIC BLOOD PRESSURE: 66 MMHG | WEIGHT: 242 LBS | SYSTOLIC BLOOD PRESSURE: 102 MMHG

## 2023-01-17 DIAGNOSIS — I73.9 PERIPHERAL ARTERY DISEASE (HCC): ICD-10-CM

## 2023-01-17 DIAGNOSIS — R39.12 BENIGN PROSTATIC HYPERPLASIA WITH WEAK URINARY STREAM: ICD-10-CM

## 2023-01-17 DIAGNOSIS — I65.29 ASYMPTOMATIC CAROTID ARTERY STENOSIS, UNSPECIFIED LATERALITY: Primary | ICD-10-CM

## 2023-01-17 DIAGNOSIS — I48.91 ATRIAL FIBRILLATION, UNSPECIFIED TYPE (HCC): ICD-10-CM

## 2023-01-17 DIAGNOSIS — E11.49 TYPE II OR UNSPECIFIED TYPE DIABETES MELLITUS WITH NEUROLOGICAL MANIFESTATIONS, NOT STATED AS UNCONTROLLED(250.60) (HCC): ICD-10-CM

## 2023-01-17 DIAGNOSIS — N40.1 BENIGN PROSTATIC HYPERPLASIA WITH WEAK URINARY STREAM: ICD-10-CM

## 2023-01-17 DIAGNOSIS — F41.1 GAD (GENERALIZED ANXIETY DISORDER): ICD-10-CM

## 2023-01-17 LAB
INR BLD: 1.6
PT POC: 41.2 SECONDS
VALID INTERNAL CONTROL?: YES

## 2023-01-17 PROCEDURE — G8536 NO DOC ELDER MAL SCRN: HCPCS | Performed by: FAMILY MEDICINE

## 2023-01-17 PROCEDURE — 85610 PROTHROMBIN TIME: CPT | Performed by: FAMILY MEDICINE

## 2023-01-17 PROCEDURE — G8510 SCR DEP NEG, NO PLAN REQD: HCPCS | Performed by: FAMILY MEDICINE

## 2023-01-17 PROCEDURE — 1101F PT FALLS ASSESS-DOCD LE1/YR: CPT | Performed by: FAMILY MEDICINE

## 2023-01-17 PROCEDURE — 3074F SYST BP LT 130 MM HG: CPT | Performed by: FAMILY MEDICINE

## 2023-01-17 PROCEDURE — G8417 CALC BMI ABV UP PARAM F/U: HCPCS | Performed by: FAMILY MEDICINE

## 2023-01-17 PROCEDURE — 1123F ACP DISCUSS/DSCN MKR DOCD: CPT | Performed by: FAMILY MEDICINE

## 2023-01-17 PROCEDURE — G8427 DOCREV CUR MEDS BY ELIG CLIN: HCPCS | Performed by: FAMILY MEDICINE

## 2023-01-17 PROCEDURE — 3078F DIAST BP <80 MM HG: CPT | Performed by: FAMILY MEDICINE

## 2023-01-17 PROCEDURE — 99213 OFFICE O/P EST LOW 20 MIN: CPT | Performed by: FAMILY MEDICINE

## 2023-01-17 RX ORDER — CITALOPRAM 20 MG/1
TABLET, FILM COATED ORAL
Qty: 90 TABLET | Refills: 0 | Status: SHIPPED | OUTPATIENT
Start: 2023-01-17

## 2023-01-17 RX ORDER — WARFARIN SODIUM 5 MG/1
TABLET ORAL
Qty: 90 TABLET | Refills: 0
Start: 2023-01-17

## 2023-01-17 RX ORDER — TAMSULOSIN HYDROCHLORIDE 0.4 MG/1
CAPSULE ORAL
Qty: 90 CAPSULE | Refills: 0 | Status: SHIPPED | OUTPATIENT
Start: 2023-01-17

## 2023-01-17 NOTE — PROGRESS NOTES
Payton Valadez is a [de-identified] y.o. male who presents with the following:  Chief Complaint   Patient presents with    Anticoagulation       Patient's type 2 diabetes mellitus is doing well on current medications. The patient is in today for evaluation of his anticoagulation for his carotid stenosis related to his peripheral arterial disease and his atrial fibrillation. The patient was a little low today at 1.6 and have suggested that he increase his medications to 1 full tablet Sunday through Friday and take half tablet on Saturday and will recheck in 1 month or sooner should he develop any unusual bruising or bleeding. Allergies   Allergen Reactions    Ace Inhibitors Cough    Bupivacaine Hives, Rash, Itching and Other (comments)     Wheezing       Current Outpatient Medications   Medication Sig    warfarin (COUMADIN) 5 mg tablet Take 1 tablet Sunday through Friday and half a tablet on Saturday    gemfibroziL (LOPID) 600 mg tablet Take 1 tablet by mouth twice daily    pantoprazole (PROTONIX) 40 mg tablet TAKE 1 TABLET BY MOUTH ONCE DAILY IN THE EVENING    losartan (COZAAR) 50 mg tablet Take 1 tablet by mouth once daily    atenoloL (TENORMIN) 25 mg tablet Take 1.5 Tablets by mouth daily. tamsulosin (FLOMAX) 0.4 mg capsule Take 1 capsule by mouth once daily    citalopram (CELEXA) 20 mg tablet Take 1 tablet by mouth once daily    albuterol (PROVENTIL HFA, VENTOLIN HFA, PROAIR HFA) 90 mcg/actuation inhaler Take 1 Puff by inhalation every six (6) hours as needed for Wheezing. ipratropium (ATROVENT) 42 mcg (0.06 %) nasal spray 1 [place] by Both Nostrils route four (4) times daily. Indications: runny nose    lovastatin (MEVACOR) 40 mg tablet Take 1 Tablet by mouth nightly. multivitamin (ONE A DAY) tablet Take 1 Tablet by mouth daily. No current facility-administered medications for this visit.        Past Medical History:   Diagnosis Date    Adverse effect of anesthesia     Could not seat LMA's x2 sizes due to leak.  no trouble with intubation 2106    Arrhythmia     h/o A-Fib    Arrhythmia     SSS    Arthritis     Asthma     CAD (coronary artery disease)     h/o stents    Diabetes ([facility] [address]     borderline per pt. 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 after 2016 shoulder surgery, pt reports that he had to see ENT. pt states he was told that vocal cords were \"bruised\"  pt states at this time he is ~ 50% improved.        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       Family History   Problem Relation Age of Onset    Heart Disease Mother     Heart Disease Father     Cancer Father         prostate    Other Father         mersa sepsis       Social History     Socioeconomic History    Marital status:    Tobacco Use    Smoking status: Former     Packs/day: 2.00     Years: 19.00     Pack years: 38.00     Types: Cigarettes     Start date: 1966     Quit date: 1979     Years since quittin.0    Smokeless tobacco: Never   Vaping Use    Vaping Use: Never used   Substance and Sexual Activity    Alcohol use: Yes     Comment: few drinks per year    Drug use: No    Sexual activity: Not Currently     Social Determinants of Health     Financial Resource Strain: Low Risk     Difficulty of Paying Living Expenses: Not hard at all   Food Insecurity: No Food Insecurity    Worried About Running Out of Food in the Last Year: Never true    Ran Out of Food in the Last Year: Never true       Review of Systems   Constitutional:  Negative for chills, fever, malaise/fatigue and weight loss. HENT:  Negative for congestion, hearing loss, sore throat and tinnitus. Eyes:  Negative for blurred vision, pain and discharge. Respiratory:  Negative for cough, shortness of breath and wheezing. Cardiovascular:  Negative for chest pain, palpitations, orthopnea, claudication and leg swelling. Gastrointestinal:  Negative for abdominal pain, constipation and heartburn. Genitourinary:  Negative for dysuria, frequency and urgency. Musculoskeletal:  Negative for falls, joint pain and myalgias. Skin:  Negative for itching and rash. Neurological:  Negative for dizziness, tingling, tremors and headaches. Endo/Heme/Allergies:  Negative for environmental allergies and polydipsia. Psychiatric/Behavioral:  Negative for depression and substance abuse. The patient is not nervous/anxious. Visit Vitals  /66 (BP 1 Location: Left arm)   Pulse 64   Temp 97.6 °F (36.4 °C)   Resp 20   Ht 5' 11\" (1.803 m)   Wt 242 lb (109.8 kg)   SpO2 94%   BMI 33.75 kg/m²     Physical Exam  Vitals reviewed. Constitutional:       General: He is not in acute distress. Appearance: Normal appearance. He is well-developed. He is obese. He is not ill-appearing or toxic-appearing. HENT:      Head: Normocephalic and atraumatic. Right Ear: Tympanic membrane, ear canal and external ear normal.      Left Ear: Tympanic membrane, ear canal and external ear normal.      Nose: Nose normal. No congestion or rhinorrhea. Mouth/Throat:      Mouth: Mucous membranes are moist.      Pharynx: No oropharyngeal exudate or posterior oropharyngeal erythema. Eyes:      General:         Right eye: No discharge. Left eye: No discharge.       Extraocular Movements: Extraocular movements intact. Conjunctiva/sclera: Conjunctivae normal.      Pupils: Pupils are equal, round, and reactive to light. Comments: Pupil iris and anterior chamber normal.   Neck:      Trachea: No tracheal deviation. Cardiovascular:      Rate and Rhythm: Normal rate and regular rhythm. Pulses: Normal pulses. Heart sounds: Normal heart sounds. No murmur heard. No friction rub. No gallop. Pulmonary:      Effort: Pulmonary effort is normal. No respiratory distress. Breath sounds: Normal breath sounds. No wheezing, rhonchi or rales. Chest:      Chest wall: No tenderness. Abdominal:      General: Bowel sounds are normal. There is no distension. Palpations: Abdomen is soft. There is no mass. Tenderness: There is no abdominal tenderness. There is no guarding or rebound. Hernia: No hernia is present. Musculoskeletal:         General: No swelling or tenderness. Normal range of motion. Cervical back: Normal range of motion and neck supple. Lymphadenopathy:      Cervical: No cervical adenopathy. Skin:     General: Skin is warm and dry. Coloration: Skin is not pale. Findings: No erythema or rash. Neurological:      General: No focal deficit present. Mental Status: He is alert and oriented to person, place, and time. Cranial Nerves: No cranial nerve deficit. Sensory: No sensory deficit. Motor: No abnormal muscle tone. Deep Tendon Reflexes: Reflexes are normal and symmetric. Reflexes normal.      Comments: Cranial nerves II through XII intact sensory and motor. Deep tendon reflexes in the biceps triceps knee and ankle are normal and bilaterally symmetrical.   Psychiatric:         Mood and Affect: Mood normal.         Behavior: Behavior normal.         ICD-10-CM ICD-9-CM    1. Asymptomatic carotid artery stenosis, unspecified laterality  I65.29 433.10 AMB POC PT/INR      2.  Atrial fibrillation, unspecified type ([address] I48.91 427.31 warfarin (COUMADIN) 5 mg tablet      3. Type II or unspecified type diabetes mellitus with neurological manifestations, not stated as uncontrolled(250.60) ([facility]  E11.49 250.60       4. Peripheral artery disease (HCC)  I73.9 443.9           Orders Placed This Encounter    AMB POC PT/INR    warfarin (COUMADIN) 5 mg tablet     Sig: Take 1 tablet Sunday through Friday and half a tablet on Saturday     Dispense:  90 Tablet     Refill:  0       Follow-up and Dispositions    Return in about 4 weeks (around 2/14/2023).          Iza Osman MD

## 2023-01-17 NOTE — PROGRESS NOTES
1. \"Have you been to the ER, urgent care clinic since your last visit? Hospitalized since your last visit? \" No    2. \"Have you seen or consulted any other health care providers outside of the [address] [place] [address] since your last visit? \" No     3. For patients aged 39-70: Has the patient had a colonoscopy / FIT/ Cologuard? NA - based on age     If the patient is female:    4. For patients aged 41-77: Has the patient had a mammogram within the past 2 years? NA - based on age    11. For patients aged 21-65: Has the patient had a pap smear?  NA - based on age

## 2023-02-22 ENCOUNTER — OFFICE VISIT (OUTPATIENT)
Dept: FAMILY MEDICINE CLINIC | Age: 83
End: 2023-02-22
Payer: MEDICARE

## 2023-02-22 VITALS
OXYGEN SATURATION: 98 % | RESPIRATION RATE: 17 BRPM | WEIGHT: 246 LBS | BODY MASS INDEX: 34.44 KG/M2 | HEART RATE: 74 BPM | TEMPERATURE: 98.4 F | SYSTOLIC BLOOD PRESSURE: 144 MMHG | HEIGHT: 71 IN | DIASTOLIC BLOOD PRESSURE: 94 MMHG

## 2023-02-22 DIAGNOSIS — G47.30 SLEEP APNEA, UNSPECIFIED TYPE: ICD-10-CM

## 2023-02-22 DIAGNOSIS — J45.909 ASTHMA, UNSPECIFIED ASTHMA SEVERITY, UNSPECIFIED WHETHER COMPLICATED, UNSPECIFIED WHETHER PERSISTENT: ICD-10-CM

## 2023-02-22 DIAGNOSIS — R06.09 DYSPNEA ON EXERTION: ICD-10-CM

## 2023-02-22 DIAGNOSIS — E11.49 TYPE II OR UNSPECIFIED TYPE DIABETES MELLITUS WITH NEUROLOGICAL MANIFESTATIONS, NOT STATED AS UNCONTROLLED(250.60) (HCC): ICD-10-CM

## 2023-02-22 DIAGNOSIS — I65.29 ASYMPTOMATIC CAROTID ARTERY STENOSIS, UNSPECIFIED LATERALITY: ICD-10-CM

## 2023-02-22 DIAGNOSIS — N40.1 BENIGN PROSTATIC HYPERPLASIA WITH WEAK URINARY STREAM: ICD-10-CM

## 2023-02-22 DIAGNOSIS — F41.1 GAD (GENERALIZED ANXIETY DISORDER): ICD-10-CM

## 2023-02-22 DIAGNOSIS — I48.91 ATRIAL FIBRILLATION, UNSPECIFIED TYPE (HCC): ICD-10-CM

## 2023-02-22 DIAGNOSIS — Z00.00 ENCOUNTER FOR MEDICAL EXAMINATION TO ESTABLISH CARE: Primary | ICD-10-CM

## 2023-02-22 DIAGNOSIS — I10 PRIMARY HYPERTENSION: ICD-10-CM

## 2023-02-22 DIAGNOSIS — J38.00 VOCAL CORD PALSY: ICD-10-CM

## 2023-02-22 DIAGNOSIS — R39.12 BENIGN PROSTATIC HYPERPLASIA WITH WEAK URINARY STREAM: ICD-10-CM

## 2023-02-22 DIAGNOSIS — E03.9 ACQUIRED HYPOTHYROIDISM: ICD-10-CM

## 2023-02-22 DIAGNOSIS — Z79.01 ANTICOAGULANT LONG-TERM USE: ICD-10-CM

## 2023-02-22 DIAGNOSIS — I25.10 CHRONIC CORONARY ARTERY DISEASE: ICD-10-CM

## 2023-02-22 PROBLEM — R13.13 PHARYNGEAL DYSPHAGIA: Status: ACTIVE | Noted: 2017-08-10

## 2023-02-22 PROBLEM — M17.9 DJD (DEGENERATIVE JOINT DISEASE) OF KNEE: Status: ACTIVE | Noted: 2017-02-24

## 2023-02-22 PROBLEM — Z96.1 PSEUDOPHAKIA OF BOTH EYES: Status: ACTIVE | Noted: 2020-12-08

## 2023-02-22 LAB
INR BLD: 2.7 (ref 1–1.5)
PT POC: 32.3 SECONDS (ref 9.1–12)
VALID INTERNAL CONTROL?: YES

## 2023-02-22 PROCEDURE — 85610 PROTHROMBIN TIME: CPT

## 2023-02-22 PROCEDURE — 93005 ELECTROCARDIOGRAM TRACING: CPT

## 2023-02-22 PROCEDURE — G0463 HOSPITAL OUTPT CLINIC VISIT: HCPCS

## 2023-02-22 NOTE — PROGRESS NOTES
Subjective: (As above and below)     Chief Complaint   Patient presents with    1938 [address] is a [de-identified] y.o. male  with multiple chronic conditions who presents to the office to establish care. Was seeing Dr. Sejal Haque for many years but recently moved back to [facility] and now needs a provider closer to home. Cardiovascular Review:  The patient has essential hypertension, HLD , AFIB, CAD, Carotid stenosis and PAD  Pacemaker was recently replaced about 6 weeks ago (1/5/2023) and he has a history of cardiac stents. Followed by Dr. Dia Connolly (cardiologist). Follow-up every six months   He currently wears a watch that tells him his oxygen saturation and HR and recently he was alerted that when he was sleeping  his O2 sats dropped to 87% two nights in a row. He does have history of HEATHER but does not wear CPAP machine. Today he states he is feeling more short of breath with exertion over the last 1 month. He does have a history of vocal cord paralysis so has GARIBAY at baseline but has worsened over the last month and he is not sure if it is because he has been over exerting himself from recent move. He is taking warfarin for AFIB will check INR today. Denies any abnormal bleeding. Current medication: Losartan 50mg, Lovastatin 40 mg, Gemfibrozil 600 mg BID, atenolol 25 mg (1.5 tablet daily) and warfarin 5 mg (1 tablet Sunday-Friday and half tablet on Saturday). Compliance? Yes- tolerating medication well with any side effects. Pertinent ROS: no TIA's, no chest pain on exertion, no swelling of ankles. No claudication. No orthopnea. Asthma:   Pulmonologist (Dr. Zak Kim) at [facility]. Currently only  on albuterol. No recent hospitalization for asthma exacerbations. He also gets allergy injections here. HEATHER:   Not currently using his  cpap machine because it dries his mouth.      Anxiety:   Current medication: Celexa 20 mg daily   Compliance: Yes- tolerating medication well without any problems   He feels that his anxiety is well controlled on current therapy. BPH:  Currently on Flomax 0.4 mg daily  Tolerating medication well without side effects, he is compliant. T2DM:   Patient has type 2 diabetes but currently not on diabetic medication. Last HgbA1c was 6.1 11/11/2022  Not having polyuria, abnormal weight loss, vision problems or numbness or tingling. Patient has history of hypothyroid but not currently on thyroid replacement medication. He denies any fatigue or tiredness and no temperature intolerance. Reviewed and agree with Nurse Note duplicated in this note. Reviewed PmHx, RxHx, FmHx, SocHx, AllgHx and updated in chart. Current Outpatient Medications   Medication Sig    tamsulosin (FLOMAX) 0.4 mg capsule Take 1 capsule by mouth once daily    citalopram (CELEXA) 20 mg tablet Take 1 tablet by mouth once daily    warfarin (COUMADIN) 5 mg tablet Take 1 tablet Sunday through Friday and half a tablet on Saturday    gemfibroziL (LOPID) 600 mg tablet Take 1 tablet by mouth twice daily    pantoprazole (PROTONIX) 40 mg tablet TAKE 1 TABLET BY MOUTH ONCE DAILY IN THE EVENING    losartan (COZAAR) 50 mg tablet Take 1 tablet by mouth once daily    atenoloL (TENORMIN) 25 mg tablet Take 1.5 Tablets by mouth daily. albuterol (PROVENTIL HFA, VENTOLIN HFA, PROAIR HFA) 90 mcg/actuation inhaler Take 1 Puff by inhalation every six (6) hours as needed for Wheezing. ipratropium (ATROVENT) 42 mcg (0.06 %) nasal spray 1 [place] by Both Nostrils route four (4) times daily. Indications: runny nose    lovastatin (MEVACOR) 40 mg tablet Take 1 Tablet by mouth nightly. multivitamin (ONE A DAY) tablet Take 1 Tablet by mouth daily. No current facility-administered medications for this visit.       Allergies   Allergen Reactions    Ace Inhibitors Cough    Bupivacaine Hives, Rash, Itching and Other (comments)     Wheezing      Past Medical History:   Diagnosis Date Adverse effect of anesthesia     Could not seat LMA's x2 sizes due to leak. no trouble with intubation 2106    Arrhythmia     h/o A-Fib    Arrhythmia     SSS    Arthritis     Asthma     CAD (coronary artery disease)     h/o stents    Diabetes ([facility] [address]     borderline per pt. 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 after 2016 shoulder surgery, pt reports that he had to see ENT. pt states he was told that vocal cords were \"bruised\"  pt states at this time he is ~ 50% improved.       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      Family History   Problem Relation Age of Onset    Heart Disease Mother     Heart Disease Father     Cancer Father         prostate    Other Father         mersa sepsis      Social History     Socioeconomic History    Marital status:      Spouse name: Not on file    Number of children: Not on file    Years of education: Not on file    Highest education level: Not on file   Occupational History    Not on file   Tobacco Use    Smoking status: Former     Packs/day: 2.00     Years: 19.00     Pack years: 38.00     Types: Cigarettes     Start date: 1966     Quit date: 1979     Years since quittin.1    Smokeless tobacco: Never   Vaping Use    Vaping Use: Never used   Substance and Sexual Activity    Alcohol use: Yes     Comment: few drinks per year    Drug use: No    Sexual activity: Not Currently   Other Topics Concern    Not on file   Social History Narrative    Not on file     Social Determinants of Health     Financial Resource Strain: Low Risk     Difficulty of Paying Living Expenses: Not hard at all   Food Insecurity: No Food Insecurity    Worried About Running Out of Food in the Last Year: Never true    Ran Out of Food in the Last Year: Never true   Transportation Needs: Not on file   Physical Activity: Unknown    Days of Exercise per Week: 0 days    Minutes of Exercise per Session: Not on file   Stress: Not on file   Social Connections: Not on file   Intimate Partner Violence: Not At Risk    Fear of Current or Ex-Partner: No    Emotionally Abused: No    Physically Abused: No    Sexually Abused: No   Housing Stability: Not on file             Review of Systems   Constitutional:  Negative for chills, diaphoresis, fever, malaise/fatigue and weight loss. HENT: Negative. +bilateral hearing aids    Eyes: Negative. Respiratory:  Positive for shortness of breath. Negative for cough, hemoptysis, sputum production and wheezing. Cardiovascular:  Negative for chest pain, palpitations, orthopnea, claudication, leg swelling and PND. Gastrointestinal:  Negative for abdominal pain, blood in stool, constipation, diarrhea, heartburn, melena, nausea and vomiting. Genitourinary:  Negative for dysuria, flank pain, frequency, hematuria and urgency. Musculoskeletal: Negative. Skin: Negative. Neurological:  Negative for dizziness, tingling, tremors, sensory change, speech change, focal weakness, seizures, loss of consciousness, weakness and headaches. Endo/Heme/Allergies: Negative. Psychiatric/Behavioral: Negative.       Objective:     Physical Examination:    Visit Vitals  BP (!) 144/94 (BP 1 Location: Left upper arm, BP Patient Position: Sitting, BP Cuff Size: Adult) Pulse 74   Temp 98.4 °F (36.9 °C) (Temporal)   Resp 17   Ht 5' 11\" (1.803 m)   Wt 246 lb (111.6 kg)   SpO2 98%   BMI 34.31 kg/m²       Physical Exam  Vitals and nursing note reviewed. Constitutional:       General: He is not in acute distress. Appearance: Normal appearance. HENT:      Head: Normocephalic. Right Ear: Tympanic membrane, ear canal and external ear normal.      Left Ear: Tympanic membrane, ear canal and external ear normal.      Nose: Nose normal.      Mouth/Throat:      Mouth: Mucous membranes are moist.      Pharynx: Oropharynx is clear. Eyes:      Extraocular Movements: Extraocular movements intact. Conjunctiva/sclera: Conjunctivae normal.      Pupils: Pupils are equal, round, and reactive to light. Neck:      Thyroid: No thyromegaly or thyroid tenderness. Vascular: No carotid bruit. Cardiovascular:      Rate and Rhythm: Normal rate and regular rhythm. Pulses: Normal pulses. Heart sounds: Normal heart sounds. Pulmonary:      Effort: Pulmonary effort is normal. No respiratory distress. Breath sounds: Normal breath sounds. Abdominal:      General: Bowel sounds are normal. There is no distension. Palpations: Abdomen is soft. Tenderness: There is no abdominal tenderness. Musculoskeletal:      Cervical back: No tenderness. Right lower leg: No edema. Left lower leg: No edema. Lymphadenopathy:      Cervical: No cervical adenopathy. Skin:     General: Skin is warm and dry. Neurological:      General: No focal deficit present. Mental Status: He is alert and oriented to person, place, and time. Mental status is at baseline. Psychiatric:         Mood and Affect: Mood normal.         Behavior: Behavior normal.         Thought Content:  Thought content normal.         Judgment: Judgment normal.           3 most recent PHQ Screens 2/22/2023   Little interest or pleasure in doing things Not at all   Feeling down, depressed, irritable, or hopeless Not at all   Total Score PHQ 2 0        Assessment/ Plan:   Differential diagnosis and treatment options reviewed with patient who is in agreement with treatment plan as outlined below. ICD-10-CM ICD-9-CM    1. Encounter for medical examination to establish care  Z00.00 V70.9       2. Primary hypertension  I10 401.9       3. Type II or unspecified type diabetes mellitus with neurological manifestations, not stated as uncontrolled(250.60) ([facility] [address]  E11.49 250.60       4. Atrial fibrillation, unspecified type ([facility]  I48.91 427.31       5. Asymptomatic carotid artery stenosis, unspecified laterality  I65.29 433.10       6. Chronic coronary artery disease  I25.10 414.00       7. Dyspnea on exertion  R06.09 786.09 AMB POC EKG ROUTINE W/ 12 LEADS, INTER & REP      8. ALEJANDRO (generalized anxiety disorder)  F41.1 300.02       9. Benign prostatic hyperplasia with weak urinary stream  N40.1 600.01     R39.12 788.62       10. Anticoagulant long-term use  Z79.01 V58.61 AMB POC PT/INR      11. Acquired hypothyroidism  E03.9 244.9       12. Vocal cord palsy  J38.00 478.30       13. Asthma, unspecified asthma severity, unspecified whether complicated, unspecified whether persistent  J45.909 493.90       14. Sleep apnea, unspecified type  G47.30 780.57          Lab work from December and November were reviewed and all stable at this time. Will repeat lab work at next visit. Discussed diet, exercise and lifestyle modifications. Patient did not need medication refilled at this time. Continue with all medication at this time, no changes to be made. Low O2 when he is sleeping most likely due to his HEATHER as he is not wearing his CPAP machine. Offered patient sleep study but he states his pulmonologist office does sleep studies and he will follow-up there for further evaluation. GARIBAY:   EKG showed afib with regular rate.    Advised patient to follow-up with cardiologist and pulmonologist sooner rather than later. Discussed red flag symptoms that warrant ED visit. Long-term anticoag use.:   INR today was 2.6. Continue with current therapy/dose. Repeat in 1 week. Goal INR is 2-3. Follow-up and Dispositions    Return in about 3 months (around 5/22/2023), or if symptoms worsen or fail to improve, for chronic illness follow-up with fasting lab . Medication Side Effects and Warnings were discussed with patient: yes  Patient Labs were reviewed: yes  Patient Past Records were reviewed:  yes    Verbal and written instructions (see AVS) provided. Patient expresses understanding and agreement of diagnosis and treatment plan.     Leander Artis, AVELINO

## 2023-02-22 NOTE — PROGRESS NOTES
Chief Complaint   Patient presents with    [address] Maintenance reviewed     1. Have you been to the ER, urgent care clinic since your last visit? Hospitalized since your last visit? No     2. Have you seen or consulted any other health care providers outside of the [address] [place] [address] since your last visit? Include any pap smears or colon screening.   No

## 2023-02-22 NOTE — PATIENT INSTRUCTIONS
Heart-Healthy Diet: Care Instructions  Your Care Instructions     A heart-healthy diet has lots of vegetables, fruits, nuts, beans, and whole grains, and is low in salt. It limits foods that are high in saturated fat, such as meats, cheeses, and fried foods. It may be hard to change your diet, but even small changes can lower your risk of heart attack and heart disease. Follow-up care is a key part of your treatment and safety. Be sure to make and go to all appointments, and call your doctor if you are having problems. It's also a good idea to know your test results and keep a list of the medicines you take. How can you care for yourself at home? Watch your portions  Use food labels to learn what the recommended servings are for the foods you eat. Eat only the number of calories you need to stay at a healthy weight. If you need to lose weight, eat fewer calories than your body burns (through exercise and other physical activity). Eat more fruits and vegetables  Eat a variety of fruit and vegetables every day. Dark green, deep orange, red, or yellow fruits and vegetables are especially good for you. Examples include spinach, carrots, peaches, and berries. Keep carrots, celery, and other veggies handy for snacks. Buy fruit that is in season and store it where you can see it so that you will be tempted to eat it. Cook dishes that have a lot of veggies in them, such as stir-fries and soups. Limit saturated fat  Read food labels, and try to avoid saturated fats. They increase your risk of heart disease. Use olive or canola oil when you cook. Bake, broil, grill, or steam foods instead of frying them. Choose lean meats instead of high-fat meats such as hot dogs and sausages. Cut off all visible fat when you prepare meat. Eat fish, skinless poultry, and meat alternatives such as soy products instead of high-fat meats. Soy products, such as tofu, may be especially good for your heart.   Choose low-fat or fat-free milk and dairy products. Eat foods high in fiber  Eat a variety of grain products every day. Include whole-grain foods that have lots of fiber and nutrients. Examples of whole-grain foods include oats, whole wheat bread, and brown rice. Buy whole-grain breads and cereals, instead of white bread or pastries. Limit salt and sodium  Limit how much salt and sodium you eat to help lower your blood pressure. Taste food before you salt it. Add only a little salt when you think you need it. With time, your taste buds will adjust to less salt. Eat fewer snack items, fast foods, and other high-salt, processed foods. Check food labels for the amount of sodium in packaged foods. Choose low-sodium versions of canned goods (such as soups, vegetables, and beans). Limit sugar  Limit drinks and foods with added sugar. These include candy, desserts, and soda pop. Limit alcohol  Limit alcohol to no more than 2 drinks a day for men and 1 drink a day for women. Too much alcohol can cause health problems. When should you call for help? Watch closely for changes in your health, and be sure to contact your doctor if:    You would like help planning heart-healthy meals. Where can you learn more? Go to http://www.[employer].com/  Enter V137 in the search box to learn more about \"Heart-Healthy Diet: Care Instructions. \"  Current as of: October 6, 2021               Content Version: 13.4  © 2450-3576 [employer]. Care instructions adapted under license by [employer] (which disclaims liability or warranty for this information). If you have questions about a medical condition or this instruction, always ask your healthcare professional. [address] any warranty or liability for your use of this information.

## 2023-03-01 ENCOUNTER — CLINICAL SUPPORT (OUTPATIENT)
Dept: FAMILY MEDICINE CLINIC | Age: 83
End: 2023-03-01
Payer: MEDICARE

## 2023-03-01 DIAGNOSIS — I48.91 ATRIAL FIBRILLATION, UNSPECIFIED TYPE (HCC): Primary | ICD-10-CM

## 2023-03-01 LAB
INR BLD: 3
PT POC: 36 SECONDS
VALID INTERNAL CONTROL?: YES

## 2023-03-01 PROCEDURE — 85610 PROTHROMBIN TIME: CPT

## 2023-03-01 NOTE — PROGRESS NOTES
Patsy Vora is a [de-identified] y.o. male who presents today for Anticoagulation monitoring. Indication: Atrial Fibrillation  INR Goal: 1.0 - 1.5. Current dose:  Coumadin 5 mg daily, except 2.5 mg Sat. Missed Coumadin Doses:  None  Medication Changes:  no  Dietary Changes:  no    Symptoms: taking coumadin appropriately without any bleeding. Latest INRs:  Lab Results   Component Value Date/Time    INR 1.8 (H) 12/19/2022 10:31 AM    INR 1.0 04/10/2019 04:49 AM    INR 1.6 (H) 03/26/2019 10:33 AM    INR (POC) 1.3 (H) 01/05/2023 12:05 PM    INR (POC) 1.1 04/09/2019 08:44 AM    INR POC 2.7 (A) 02/22/2023 01:08 PM    INR POC 1.6 01/17/2023 10:17 AM    INR POC 2.3 12/09/2022 09:00 AM    Prothrombin time 17.8 (H) 12/19/2022 10:31 AM    Prothrombin time 10.7 04/10/2019 04:49 AM    Prothrombin time 15.6 (H) 03/26/2019 10:33 AM        New Coumadin dose:.current treatment plan is effective, no change in therapy. Next check to be scheduled for  1 weeks.

## 2023-03-08 ENCOUNTER — CLINICAL SUPPORT (OUTPATIENT)
Dept: FAMILY MEDICINE CLINIC | Age: 83
End: 2023-03-08
Payer: MEDICARE

## 2023-03-08 DIAGNOSIS — I48.91 ATRIAL FIBRILLATION, UNSPECIFIED TYPE (HCC): Primary | ICD-10-CM

## 2023-03-08 LAB
INR BLD: 2.7
PT POC: 32 SECONDS
VALID INTERNAL CONTROL?: YES

## 2023-03-08 PROCEDURE — 85610 PROTHROMBIN TIME: CPT

## 2023-03-08 NOTE — PROGRESS NOTES
Shreyas Cruz is a [de-identified] y.o. male who presents today for Anticoagulation monitoring. Indication: Atrial Fibrillation  INR Goal: 2.0-3.0. Current dose:  Coumadin 5 mg daily, 2.5 mg Saturdays. Missed Coumadin Doses:  None  Medication Changes:  no  Dietary Changes:  no    Symptoms: taking coumadin appropriately without any bleeding. Latest INRs:  Lab Results   Component Value Date/Time    INR 1.8 (H) 12/19/2022 10:31 AM    INR 1.0 04/10/2019 04:49 AM    INR 1.6 (H) 03/26/2019 10:33 AM    INR (POC) 1.3 (H) 01/05/2023 12:05 PM    INR (POC) 1.1 04/09/2019 08:44 AM    INR POC 3.0 03/01/2023 10:59 AM    INR POC 2.7 (A) 02/22/2023 01:08 PM    INR POC 1.6 01/17/2023 10:17 AM    Prothrombin time 17.8 (H) 12/19/2022 10:31 AM    Prothrombin time 10.7 04/10/2019 04:49 AM    Prothrombin time 15.6 (H) 03/26/2019 10:33 AM        New Coumadin dose:.current treatment plan is effective, no change in therapy. Next check to be scheduled for  2 weeks.

## 2023-03-16 DIAGNOSIS — I10 ESSENTIAL HYPERTENSION: ICD-10-CM

## 2023-03-16 DIAGNOSIS — E78.5 DYSLIPIDEMIA: ICD-10-CM

## 2023-03-16 DIAGNOSIS — K21.9 GASTROESOPHAGEAL REFLUX DISEASE WITHOUT ESOPHAGITIS: ICD-10-CM

## 2023-03-16 RX ORDER — PANTOPRAZOLE SODIUM 40 MG/1
TABLET, DELAYED RELEASE ORAL
Qty: 90 TABLET | Refills: 0 | Status: SHIPPED | OUTPATIENT
Start: 2023-03-16

## 2023-03-16 RX ORDER — LOSARTAN POTASSIUM 50 MG/1
50 TABLET ORAL DAILY
Qty: 90 TABLET | Refills: 0 | Status: SHIPPED | OUTPATIENT
Start: 2023-03-16

## 2023-03-16 RX ORDER — GEMFIBROZIL 600 MG/1
600 TABLET, FILM COATED ORAL 2 TIMES DAILY
Qty: 180 TABLET | Refills: 1 | Status: SHIPPED | OUTPATIENT
Start: 2023-03-16

## 2023-03-16 RX ORDER — ATENOLOL 25 MG/1
40 TABLET ORAL DAILY
Qty: 135 TABLET | Refills: 5 | Status: SHIPPED | OUTPATIENT
Start: 2023-03-16

## 2023-03-16 NOTE — TELEPHONE ENCOUNTER
Refill Request (patient walk-in)     -Pt is out of medication   -Please call pt when RX's have been sent in     Requested Prescriptions     Pending Prescriptions Disp Refills    gemfibroziL (LOPID) 600 mg tablet 180 Tablet 1     Sig: Take 1 Tablet by mouth two (2) times a day. atenoloL (TENORMIN) 25 mg tablet 135 Tablet 5     Sig: Take 1.5 Tablets by mouth daily. pantoprazole (PROTONIX) 40 mg tablet 90 Tablet 0    losartan (COZAAR) 50 mg tablet 90 Tablet 0     Sig: Take 1 Tablet by mouth daily.        Thank You

## 2023-03-22 ENCOUNTER — CLINICAL SUPPORT (OUTPATIENT)
Dept: FAMILY MEDICINE CLINIC | Age: 83
End: 2023-03-22
Payer: MEDICARE

## 2023-03-22 DIAGNOSIS — I10 ESSENTIAL HYPERTENSION: ICD-10-CM

## 2023-03-22 DIAGNOSIS — I48.91 ATRIAL FIBRILLATION, UNSPECIFIED TYPE (HCC): Primary | ICD-10-CM

## 2023-03-22 LAB
INR BLD: 2.8
PT POC: 33.8 SECONDS
VALID INTERNAL CONTROL?: YES

## 2023-03-22 PROCEDURE — 85610 PROTHROMBIN TIME: CPT

## 2023-03-22 RX ORDER — ATENOLOL 50 MG/1
50 TABLET ORAL DAILY
Qty: 90 TABLET | Refills: 1 | Status: SHIPPED | OUTPATIENT
Start: 2023-03-22

## 2023-03-22 NOTE — PROGRESS NOTES
Elin Haro is a [de-identified] y.o. male who presents today for Anticoagulation monitoring. Indication: Atrial Fibrillation  INR Goal: 2.0-3.0. Current dose:  Coumadin 5 mg daily, except 2.5 mg Sat. Missed Coumadin Doses:  None  Medication Changes:  no  Dietary Changes:  no    Symptoms: taking coumadin appropriately without any bleeding. Latest INRs:  Lab Results   Component Value Date/Time    INR 1.8 (H) 12/19/2022 10:31 AM    INR 1.0 04/10/2019 04:49 AM    INR 1.6 (H) 03/26/2019 10:33 AM    INR (POC) 1.3 (H) 01/05/2023 12:05 PM    INR (POC) 1.1 04/09/2019 08:44 AM    INR POC 2.7 03/08/2023 09:31 AM    INR POC 3.0 03/01/2023 10:59 AM    INR POC 2.7 (A) 02/22/2023 01:08 PM    Prothrombin time 17.8 (H) 12/19/2022 10:31 AM    Prothrombin time 10.7 04/10/2019 04:49 AM    Prothrombin time 15.6 (H) 03/26/2019 10:33 AM        New Coumadin dose:.current treatment plan is effective, no change in therapy. Next check to be scheduled for  2 weeks.

## 2023-04-05 ENCOUNTER — CLINICAL SUPPORT (OUTPATIENT)
Dept: FAMILY MEDICINE CLINIC | Age: 83
End: 2023-04-05
Payer: MEDICARE

## 2023-04-05 LAB
INR BLD: 2.9
PT POC: 34.9 SECONDS
VALID INTERNAL CONTROL?: YES

## 2023-04-05 PROCEDURE — 85610 PROTHROMBIN TIME: CPT

## 2023-04-05 RX ORDER — WARFARIN SODIUM 5 MG/1
TABLET ORAL
Qty: 90 TABLET | Refills: 0 | Status: SHIPPED
Start: 2023-04-05

## 2023-04-05 NOTE — PROGRESS NOTES
Hussein Argueta is a 82 y.o. male who presents today for Anticoagulation monitoring. Indication: Atrial Fibrillation  INR Goal: 2.0-3.0. Current dose:  Coumadin 2.5 mg Sat and 5mg all other days. Missed Coumadin Doses:  None  Medication Changes:  no  Dietary Changes:  no    Symptoms: taking coumadin appropriately without any bleeding. Latest INRs:  Lab Results   Component Value Date/Time    INR 1.8 (H) 12/19/2022 10:31 AM    INR 1.0 04/10/2019 04:49 AM    INR 1.6 (H) 03/26/2019 10:33 AM    INR (POC) 1.3 (H) 01/05/2023 12:05 PM    INR (POC) 1.1 04/09/2019 08:44 AM    INR POC 2.8 03/22/2023 09:31 AM    INR POC 2.7 03/08/2023 09:31 AM    INR POC 3.0 03/01/2023 10:59 AM    Prothrombin time 17.8 (H) 12/19/2022 10:31 AM    Prothrombin time 10.7 04/10/2019 04:49 AM    Prothrombin time 15.6 (H) 03/26/2019 10:33 AM        New Coumadin dose:.current treatment plan is effective, no change in therapy. Next check to be scheduled for  3 weeks.

## 2023-04-05 NOTE — TELEPHONE ENCOUNTER
Refill request:  -Pt came in Office for NV  -Pt would like a call when RX has been sent     Requested Prescriptions     Pending Prescriptions Disp Refills    warfarin (COUMADIN) 5 mg tablet 90 Tablet 0     Sig: Take 1 tablet Sunday through Friday and half a tablet on Saturday         Thank You

## 2023-04-06 NOTE — TELEPHONE ENCOUNTER
verified. Informed pt of message from provider regarding medication refilled. Pt verified understanding and had no further questions.

## 2023-04-26 ENCOUNTER — CLINICAL SUPPORT (OUTPATIENT)
Dept: FAMILY MEDICINE CLINIC | Age: 83
End: 2023-04-26
Payer: MEDICARE

## 2023-04-26 DIAGNOSIS — F41.1 GAD (GENERALIZED ANXIETY DISORDER): ICD-10-CM

## 2023-04-26 DIAGNOSIS — N40.1 BENIGN PROSTATIC HYPERPLASIA WITH WEAK URINARY STREAM: ICD-10-CM

## 2023-04-26 DIAGNOSIS — I48.91 ATRIAL FIBRILLATION, UNSPECIFIED TYPE (HCC): Primary | ICD-10-CM

## 2023-04-26 DIAGNOSIS — R39.12 BENIGN PROSTATIC HYPERPLASIA WITH WEAK URINARY STREAM: ICD-10-CM

## 2023-04-26 LAB
INR BLD: 2.6
PT POC: 30.9 SECONDS
VALID INTERNAL CONTROL?: YES

## 2023-04-26 PROCEDURE — 85610 PROTHROMBIN TIME: CPT

## 2023-04-26 RX ORDER — TAMSULOSIN HYDROCHLORIDE 0.4 MG/1
0.4 CAPSULE ORAL DAILY
Qty: 90 CAPSULE | Refills: 0 | Status: SHIPPED | OUTPATIENT
Start: 2023-04-26

## 2023-04-26 RX ORDER — ALBUTEROL SULFATE 90 UG/1
1 AEROSOL, METERED RESPIRATORY (INHALATION)
Qty: 18 G | Refills: 3 | Status: SHIPPED | OUTPATIENT
Start: 2023-04-26

## 2023-04-26 RX ORDER — CITALOPRAM 20 MG/1
20 TABLET, FILM COATED ORAL DAILY
Qty: 90 TABLET | Refills: 0 | Status: SHIPPED | OUTPATIENT
Start: 2023-04-26

## 2023-04-26 NOTE — PROGRESS NOTES
Fani Bonilla is a 82 y.o. male who presents today for Anticoagulation monitoring. Indication: Atrial Fibrillation  INR Goal: 2.0-3.0. Current dose:  Coumadin 5 mg daily, except 2.5 mg Saturdays. Missed Coumadin Doses:  None  Medication Changes:  no  Dietary Changes:  no    Symptoms: taking coumadin appropriately without any bleeding. Latest INRs:  Lab Results   Component Value Date/Time    INR 1.8 (H) 12/19/2022 10:31 AM    INR 1.0 04/10/2019 04:49 AM    INR 1.6 (H) 03/26/2019 10:33 AM    INR (POC) 1.3 (H) 01/05/2023 12:05 PM    INR (POC) 1.1 04/09/2019 08:44 AM    INR POC 2.9 04/05/2023 09:21 AM    INR POC 2.8 03/22/2023 09:31 AM    INR POC 2.7 03/08/2023 09:31 AM    Prothrombin time 17.8 (H) 12/19/2022 10:31 AM    Prothrombin time 10.7 04/10/2019 04:49 AM    Prothrombin time 15.6 (H) 03/26/2019 10:33 AM        New Coumadin dose:.current treatment plan is effective, no change in therapy. Next check to be scheduled for  4 weeks.

## 2023-04-26 NOTE — TELEPHONE ENCOUNTER
Pt is coming in requesting a refill on med    Requested Prescriptions     Pending Prescriptions Disp Refills    citalopram (CELEXA) 20 mg tablet 90 Tablet 0     Sig: Take 1 Tablet by mouth daily. albuterol (PROVENTIL HFA, VENTOLIN HFA, PROAIR HFA) 90 mcg/actuation inhaler 18 g 3     Sig: Take 1 Puff by inhalation every six (6) hours as needed for Wheezing. tamsulosin (FLOMAX) 0.4 mg capsule 90 Capsule 0     Sig: Take 1 Capsule by mouth daily.

## 2023-05-23 ENCOUNTER — TELEPHONE (OUTPATIENT)
Age: 83
End: 2023-05-23

## 2023-05-23 DIAGNOSIS — H91.93 HEARING DISORDER OF BOTH EARS: Primary | ICD-10-CM

## 2023-05-23 NOTE — PROGRESS NOTES
Subjective:     Nahid Montoya is a 82 y.o. male who presents for     Chief Complaint   Patient presents with    Hypertension     Follow-up         Current Outpatient Medications on File Prior to Visit   Medication Sig Dispense Refill    arformoterol tartrate (BROVANA) 15 MCG/2ML NEBU Take 1 ampule by nebulization 2 times daily      budesonide (PULMICORT) 0.5 MG/2ML nebulizer suspension Take 2 mLs by nebulization 2 times daily      albuterol sulfate HFA (PROVENTIL;VENTOLIN;PROAIR) 108 (90 Base) MCG/ACT inhaler Inhale 1 puff into the lungs every 6 hours as needed      atenolol (TENORMIN) 25 MG tablet Take 1.5 tablets by mouth daily      citalopram (CELEXA) 20 MG tablet Take 1 tablet by mouth once daily      gemfibrozil (LOPID) 600 MG tablet Take 1 tablet by mouth 2 times daily      losartan (COZAAR) 50 MG tablet Take 1 tablet by mouth daily      lovastatin (MEVACOR) 40 MG tablet Take 1 tablet by mouth      pantoprazole (PROTONIX) 40 MG tablet TAKE 1 TABLET BY MOUTH ONCE DAILY IN THE EVENING      tamsulosin (FLOMAX) 0.4 MG capsule Take 1 capsule by mouth once daily      warfarin (COUMADIN) 5 MG tablet Take 1 tablet Sunday through Friday and half a tablet on Saturday       No current facility-administered medications on file prior to visit. Cardiovascular Review:  The patient has history of essential hypertension, HLD , AFIB, CAD, Carotid stenosis and PAD  Followed by Dr. Soo Milner (cardiologist). Follow-up every six months for most of his cardiac problems   He is taking warfarin for AFIB will check INR today. Denies any abnormal bleeding. Current medication: Losartan 50mg, Lovastatin 40 mg, Gemfibrozil 600 mg BID, atenolol 25 mg (1.5 tablet daily) and warfarin 5 mg (1 tablet Sunday-Friday and half tablet on Saturday). Compliance? Yes- tolerating medication well without any side effects. Diet: generally eat a low-salt diet. Pertinent ROS: no TIA's, no chest pain on exertion, no swelling of ankles.  No

## 2023-05-23 NOTE — TELEPHONE ENCOUNTER
Ab Felder with [facility] is calling to get orders for the Hearing Evaluation    [phone] (fax)

## 2023-05-23 NOTE — TELEPHONE ENCOUNTER
Faxed orders for hearing evaluation to Sybil Murphy with [facility] and received confirmation 5/23/23.

## 2023-05-24 ENCOUNTER — OFFICE VISIT (OUTPATIENT)
Age: 83
End: 2023-05-24
Payer: MEDICARE

## 2023-05-24 VITALS
SYSTOLIC BLOOD PRESSURE: 130 MMHG | BODY MASS INDEX: 33.46 KG/M2 | HEART RATE: 74 BPM | WEIGHT: 239 LBS | OXYGEN SATURATION: 96 % | RESPIRATION RATE: 18 BRPM | HEIGHT: 71 IN | TEMPERATURE: 98.4 F | DIASTOLIC BLOOD PRESSURE: 77 MMHG

## 2023-05-24 DIAGNOSIS — I10 PRIMARY HYPERTENSION: Primary | ICD-10-CM

## 2023-05-24 DIAGNOSIS — I48.20 CHRONIC ATRIAL FIBRILLATION (HCC): ICD-10-CM

## 2023-05-24 DIAGNOSIS — E78.5 HYPERLIPIDEMIA, UNSPECIFIED HYPERLIPIDEMIA TYPE: ICD-10-CM

## 2023-05-24 DIAGNOSIS — J44.1 CHRONIC OBSTRUCTIVE PULMONARY DISEASE WITH (ACUTE) EXACERBATION (HCC): ICD-10-CM

## 2023-05-24 DIAGNOSIS — R73.03 PREDIABETES: ICD-10-CM

## 2023-05-24 DIAGNOSIS — G47.30 SLEEP APNEA, UNSPECIFIED TYPE: ICD-10-CM

## 2023-05-24 DIAGNOSIS — E03.9 HYPOTHYROIDISM, UNSPECIFIED TYPE: ICD-10-CM

## 2023-05-24 LAB
POC INR: 3.3
PROTHROMBIN TIME, POC: 39.1

## 2023-05-24 PROCEDURE — 3023F SPIROM DOC REV: CPT

## 2023-05-24 PROCEDURE — 99214 OFFICE O/P EST MOD 30 MIN: CPT

## 2023-05-24 PROCEDURE — G8427 DOCREV CUR MEDS BY ELIG CLIN: HCPCS

## 2023-05-24 PROCEDURE — 3078F DIAST BP <80 MM HG: CPT

## 2023-05-24 PROCEDURE — 85610 PROTHROMBIN TIME: CPT

## 2023-05-24 PROCEDURE — PBSHW AMB POC PT/INR

## 2023-05-24 PROCEDURE — G8417 CALC BMI ABV UP PARAM F/U: HCPCS

## 2023-05-24 PROCEDURE — 1036F TOBACCO NON-USER: CPT

## 2023-05-24 PROCEDURE — 1123F ACP DISCUSS/DSCN MKR DOCD: CPT

## 2023-05-24 PROCEDURE — 3075F SYST BP GE 130 - 139MM HG: CPT

## 2023-05-24 RX ORDER — BUDESONIDE 0.5 MG/2ML
1 INHALANT ORAL 2 TIMES DAILY
COMMUNITY

## 2023-05-24 RX ORDER — ARFORMOTEROL TARTRATE 15 UG/2ML
1 SOLUTION RESPIRATORY (INHALATION) 2 TIMES DAILY
COMMUNITY

## 2023-05-24 SDOH — ECONOMIC STABILITY: TRANSPORTATION INSECURITY
IN THE PAST 12 MONTHS, HAS LACK OF TRANSPORTATION KEPT YOU FROM MEETINGS, WORK, OR FROM GETTING THINGS NEEDED FOR DAILY LIVING?: NO

## 2023-05-24 SDOH — ECONOMIC STABILITY: INCOME INSECURITY: HOW HARD IS IT FOR YOU TO PAY FOR THE VERY BASICS LIKE FOOD, HOUSING, MEDICAL CARE, AND HEATING?: NOT VERY HARD

## 2023-05-24 SDOH — ECONOMIC STABILITY: FOOD INSECURITY: WITHIN THE PAST 12 MONTHS, THE FOOD YOU BOUGHT JUST DIDN'T LAST AND YOU DIDN'T HAVE MONEY TO GET MORE.: NEVER TRUE

## 2023-05-24 SDOH — ECONOMIC STABILITY: HOUSING INSECURITY
IN THE LAST 12 MONTHS, WAS THERE A TIME WHEN YOU DID NOT HAVE A STEADY PLACE TO SLEEP OR SLEPT IN A SHELTER (INCLUDING NOW)?: NO

## 2023-05-24 SDOH — ECONOMIC STABILITY: FOOD INSECURITY: WITHIN THE PAST 12 MONTHS, YOU WORRIED THAT YOUR FOOD WOULD RUN OUT BEFORE YOU GOT MONEY TO BUY MORE.: NEVER TRUE

## 2023-05-24 NOTE — PROGRESS NOTES
Chief Complaint   Patient presents with    Hypertension     Follow-up          Health Maintenance Due   Topic Date Due    Diabetic retinal exam  Never done    DTaP/Tdap/Td vaccine (1 - Tdap) Never done    Shingles vaccine (1 of 2) Never done    COVID-19 Vaccine (3 - Booster for Moderna series) 04/13/2021    Diabetic foot exam  07/02/2022    A1C test (Diabetic or Prediabetic)  05/11/2023    Annual Wellness Visit (AWV)  05/13/2023           1. \"Have you been to the ER, urgent care clinic since your last visit? Hospitalized since your last visit? \" Yes on file    2. \"Have you seen or consulted any other health care providers outside of the [address] [place] [address] since your last visit? \" No     3. For patients aged 39-70: Has the patient had a colonoscopy / FIT/ Cologuard? NA - based on age      If the patient is female:    4. For patients aged 41-77: Has the patient had a mammogram within the past 2 years? NA - based on age or sex      11. For patients aged 21-65: Has the patient had a pap smear?  NA - based on age or sex

## 2023-05-25 LAB
ALBUMIN SERPL-MCNC: 4 G/DL (ref 3.5–5)
ALBUMIN/GLOB SERPL: 1.2 (ref 1.1–2.2)
ALP SERPL-CCNC: 85 U/L (ref 45–117)
ALT SERPL-CCNC: 27 U/L (ref 12–78)
ANION GAP SERPL CALC-SCNC: 7 MMOL/L (ref 5–15)
AST SERPL-CCNC: 21 U/L (ref 15–37)
BASOPHILS # BLD: 0 K/UL (ref 0–0.1)
BASOPHILS NFR BLD: 1 % (ref 0–1)
BILIRUB SERPL-MCNC: 0.4 MG/DL (ref 0.2–1)
BUN SERPL-MCNC: 15 MG/DL (ref 6–20)
BUN/CREAT SERPL: 17 (ref 12–20)
CALCIUM SERPL-MCNC: 9.1 MG/DL (ref 8.5–10.1)
CHLORIDE SERPL-SCNC: 110 MMOL/L (ref 97–108)
CHOLEST SERPL-MCNC: 219 MG/DL
CO2 SERPL-SCNC: 23 MMOL/L (ref 21–32)
CREAT SERPL-MCNC: 0.89 MG/DL (ref 0.7–1.3)
DIFFERENTIAL METHOD BLD: ABNORMAL
EOSINOPHIL # BLD: 0.1 K/UL (ref 0–0.4)
EOSINOPHIL NFR BLD: 3 % (ref 0–7)
ERYTHROCYTE [DISTWIDTH] IN BLOOD BY AUTOMATED COUNT: 14.3 % (ref 11.5–14.5)
EST. AVERAGE GLUCOSE BLD GHB EST-MCNC: 131 MG/DL
GLOBULIN SER CALC-MCNC: 3.3 G/DL (ref 2–4)
GLUCOSE SERPL-MCNC: 107 MG/DL (ref 65–100)
HBA1C MFR BLD: 6.2 % (ref 4–5.6)
HCT VFR BLD AUTO: 41.9 % (ref 36.6–50.3)
HDLC SERPL-MCNC: 28 MG/DL
HDLC SERPL: 7.8 (ref 0–5)
HGB BLD-MCNC: 13.3 G/DL (ref 12.1–17)
IMM GRANULOCYTES # BLD AUTO: 0 K/UL (ref 0–0.04)
IMM GRANULOCYTES NFR BLD AUTO: 0 % (ref 0–0.5)
LDLC SERPL CALC-MCNC: 155.8 MG/DL (ref 0–100)
LYMPHOCYTES # BLD: 0.9 K/UL (ref 0.8–3.5)
LYMPHOCYTES NFR BLD: 24 % (ref 12–49)
MCH RBC QN AUTO: 30.1 PG (ref 26–34)
MCHC RBC AUTO-ENTMCNC: 31.7 G/DL (ref 30–36.5)
MCV RBC AUTO: 94.8 FL (ref 80–99)
MONOCYTES # BLD: 0.6 K/UL (ref 0–1)
MONOCYTES NFR BLD: 16 % (ref 5–13)
NEUTS SEG # BLD: 2.1 K/UL (ref 1.8–8)
NEUTS SEG NFR BLD: 56 % (ref 32–75)
NRBC # BLD: 0 K/UL (ref 0–0.01)
NRBC BLD-RTO: 0 PER 100 WBC
PLATELET # BLD AUTO: 241 K/UL (ref 150–400)
PMV BLD AUTO: 11.5 FL (ref 8.9–12.9)
POTASSIUM SERPL-SCNC: 4.3 MMOL/L (ref 3.5–5.1)
PROT SERPL-MCNC: 7.3 G/DL (ref 6.4–8.2)
RBC # BLD AUTO: 4.42 M/UL (ref 4.1–5.7)
SODIUM SERPL-SCNC: 140 MMOL/L (ref 136–145)
TRIGL SERPL-MCNC: 176 MG/DL
TSH SERPL DL<=0.05 MIU/L-ACNC: 1.42 UIU/ML (ref 0.36–3.74)
VLDLC SERPL CALC-MCNC: 35.2 MG/DL
WBC # BLD AUTO: 3.7 K/UL (ref 4.1–11.1)

## 2023-06-07 ENCOUNTER — NURSE ONLY (OUTPATIENT)
Age: 83
End: 2023-06-07
Payer: MEDICARE

## 2023-06-07 DIAGNOSIS — I48.20 CHRONIC ATRIAL FIBRILLATION (HCC): Primary | ICD-10-CM

## 2023-06-07 LAB
POC INR: 2.7
PROTHROMBIN TIME, POC: 31.9

## 2023-06-07 PROCEDURE — PBSHW AMB POC PT/INR

## 2023-06-07 PROCEDURE — 85610 PROTHROMBIN TIME: CPT

## 2023-06-08 NOTE — PROGRESS NOTES
Reyesmarcie Myersalma 82 y.o. male who presents today for Anticoagulation monitoring. Indication: Atrial Fibrillation  INR Goal: 2.0-3.0. Today's INR: 2.7  Previous INR: 3.3  Current dose: 2.5 every Wednesday and Saturday and 5 mg all other days   Missed Coumadin Doses:  None  Medication Changes:  no  Dietary Changes:  no     Symptoms: taking coumadin appropriately without any bleeding. New Coumadin dose:.current treatment plan is effective, no change in therapy. Next check to be scheduled for 4 weeks.

## 2023-06-19 RX ORDER — LOSARTAN POTASSIUM 50 MG/1
50 TABLET ORAL DAILY
Qty: 90 TABLET | Refills: 1 | Status: SHIPPED | OUTPATIENT
Start: 2023-06-19

## 2023-06-19 RX ORDER — PANTOPRAZOLE SODIUM 40 MG/1
TABLET, DELAYED RELEASE ORAL
Qty: 90 TABLET | Refills: 1 | Status: SHIPPED | OUTPATIENT
Start: 2023-06-19

## 2023-07-05 ENCOUNTER — NURSE ONLY (OUTPATIENT)
Age: 83
End: 2023-07-05
Payer: MEDICARE

## 2023-07-05 DIAGNOSIS — I48.20 CHRONIC ATRIAL FIBRILLATION (HCC): Primary | ICD-10-CM

## 2023-07-05 LAB
POC INR: 2.1
PROTHROMBIN TIME, POC: 24.6

## 2023-07-05 PROCEDURE — PBSHW AMB POC PT/INR

## 2023-07-05 PROCEDURE — 85610 PROTHROMBIN TIME: CPT

## 2023-07-05 NOTE — PROGRESS NOTES
Mirlande Underwood is a 82 y.o. male who presents today for Anticoagulation monitoring. Indication: atrial fibrillation  INR Goal: 2.0-3.0. Current dose:  Coumadin 5 mg daily, except 2.5 mg Thurs/Sat. Missed Coumadin Doses:  None  Medication Changes:  no  Dietary Changes:  no    Symptoms: taking coumadin appropriately without any bleeding. Latest INRs:  Lab Results   Component Value Date/Time    INR 2.1 07/05/2023 09:22 AM    INR 2.7 06/07/2023 09:05 AM    INR 3.3 05/24/2023 10:56 AM    INR 2.6 04/26/2023 09:21 AM    INR 2.9 04/05/2023 09:21 AM    INR 2.8 03/22/2023 09:31 AM        New Coumadin dose:.current treatment plan is effective, no change in therapy. Next check to be scheduled for  4 weeks.

## 2023-07-12 DIAGNOSIS — I48.20 CHRONIC ATRIAL FIBRILLATION (HCC): Primary | ICD-10-CM

## 2023-07-13 RX ORDER — WARFARIN SODIUM 5 MG/1
TABLET ORAL
Qty: 90 TABLET | Refills: 1 | Status: SHIPPED | OUTPATIENT
Start: 2023-07-13

## 2023-07-31 RX ORDER — CITALOPRAM 20 MG/1
TABLET ORAL
Qty: 90 TABLET | Refills: 1 | Status: SHIPPED | OUTPATIENT
Start: 2023-07-31

## 2023-07-31 RX ORDER — TAMSULOSIN HYDROCHLORIDE 0.4 MG/1
CAPSULE ORAL
Qty: 90 CAPSULE | Refills: 1 | Status: SHIPPED | OUTPATIENT
Start: 2023-07-31

## 2023-08-04 ENCOUNTER — NURSE ONLY (OUTPATIENT)
Age: 83
End: 2023-08-04
Payer: MEDICARE

## 2023-08-04 DIAGNOSIS — I48.20 CHRONIC ATRIAL FIBRILLATION (HCC): Primary | ICD-10-CM

## 2023-08-04 LAB
POC INR: 1.9
PROTHROMBIN TIME, POC: 22.9

## 2023-08-04 PROCEDURE — 85610 PROTHROMBIN TIME: CPT

## 2023-08-04 PROCEDURE — PBSHW AMB POC PT/INR

## 2023-08-04 NOTE — PROGRESS NOTES
Ray Holguin is a 82 y.o. male who presents today for Anticoagulation monitoring. Indication: atrial fibrillation  INR Goal: 2.0-3.0. Current dose:  Coumadin 5 mg daily and 2.5 mg Th/Sat. Missed Coumadin Doses:  None  Medication Changes:  no  Dietary Changes:  no    Symptoms: taking coumadin appropriately without any bleeding. Latest INRs:  Lab Results   Component Value Date/Time    INR 2.1 07/05/2023 09:22 AM    INR 2.7 06/07/2023 09:05 AM    INR 3.3 05/24/2023 10:56 AM    INR 2.6 04/26/2023 09:21 AM    INR 2.9 04/05/2023 09:21 AM    INR 2.8 03/22/2023 09:31 AM        New Coumadin dose:.the following changes are made - see anti-coag calendar. Next check to be scheduled for  2 weeks.

## 2023-08-18 ENCOUNTER — NURSE ONLY (OUTPATIENT)
Age: 83
End: 2023-08-18
Payer: MEDICARE

## 2023-08-18 DIAGNOSIS — I48.20 CHRONIC ATRIAL FIBRILLATION (HCC): Primary | ICD-10-CM

## 2023-08-18 LAB
POC INR: 2.4
PROTHROMBIN TIME, POC: 28.6

## 2023-08-18 PROCEDURE — 85610 PROTHROMBIN TIME: CPT

## 2023-08-18 PROCEDURE — PBSHW AMB POC PT/INR

## 2023-08-18 NOTE — PROGRESS NOTES
Estiven Cantor 82 y.o.  who presents today for Anticoagulation monitoring. Indication: Atrial Fibrillation  INR Goal: 2.0-3.0. Today's INR:2.4  Previous INR: 1.9  Current dose: 2.5 mg on Thursdays and 5 mg all other days   Missed Coumadin  Doses:  None  Medication Changes:  no  Dietary Changes:  no     Symptoms: taking coumadin appropriately without any bleeding. New Coumadin dose:.current treatment plan is effective, no change in therapy. Next check to be scheduled for 4 weeks.

## 2023-08-24 SDOH — HEALTH STABILITY: PHYSICAL HEALTH: ON AVERAGE, HOW MANY DAYS PER WEEK DO YOU ENGAGE IN MODERATE TO STRENUOUS EXERCISE (LIKE A BRISK WALK)?: 4 DAYS

## 2023-08-24 SDOH — HEALTH STABILITY: PHYSICAL HEALTH: ON AVERAGE, HOW MANY MINUTES DO YOU ENGAGE IN EXERCISE AT THIS LEVEL?: 10 MIN

## 2023-08-24 ASSESSMENT — PATIENT HEALTH QUESTIONNAIRE - PHQ9
SUM OF ALL RESPONSES TO PHQ QUESTIONS 1-9: 0
SUM OF ALL RESPONSES TO PHQ QUESTIONS 1-9: 0
2. FEELING DOWN, DEPRESSED OR HOPELESS: 0
SUM OF ALL RESPONSES TO PHQ9 QUESTIONS 1 & 2: 0
SUM OF ALL RESPONSES TO PHQ QUESTIONS 1-9: 0
1. LITTLE INTEREST OR PLEASURE IN DOING THINGS: 0
SUM OF ALL RESPONSES TO PHQ QUESTIONS 1-9: 0

## 2023-08-24 ASSESSMENT — LIFESTYLE VARIABLES
HOW OFTEN DO YOU HAVE SIX OR MORE DRINKS ON ONE OCCASION: 1
HOW MANY STANDARD DRINKS CONTAINING ALCOHOL DO YOU HAVE ON A TYPICAL DAY: 1 OR 2
HOW OFTEN DO YOU HAVE A DRINK CONTAINING ALCOHOL: 3
HOW MANY STANDARD DRINKS CONTAINING ALCOHOL DO YOU HAVE ON A TYPICAL DAY: 1
HOW OFTEN DO YOU HAVE A DRINK CONTAINING ALCOHOL: 2-4 TIMES A MONTH

## 2023-08-25 ENCOUNTER — OFFICE VISIT (OUTPATIENT)
Age: 83
End: 2023-08-25
Payer: MEDICARE

## 2023-08-25 VITALS
BODY MASS INDEX: 33.26 KG/M2 | TEMPERATURE: 98.6 F | SYSTOLIC BLOOD PRESSURE: 134 MMHG | HEART RATE: 70 BPM | WEIGHT: 237.6 LBS | OXYGEN SATURATION: 94 % | DIASTOLIC BLOOD PRESSURE: 85 MMHG | HEIGHT: 71 IN

## 2023-08-25 DIAGNOSIS — E78.5 HYPERLIPIDEMIA, UNSPECIFIED HYPERLIPIDEMIA TYPE: ICD-10-CM

## 2023-08-25 DIAGNOSIS — G47.30 SLEEP APNEA, UNSPECIFIED TYPE: ICD-10-CM

## 2023-08-25 DIAGNOSIS — J44.9 CHRONIC OBSTRUCTIVE PULMONARY DISEASE, UNSPECIFIED COPD TYPE (HCC): ICD-10-CM

## 2023-08-25 DIAGNOSIS — I10 PRIMARY HYPERTENSION: ICD-10-CM

## 2023-08-25 DIAGNOSIS — E11.49 TYPE 2 DIABETES MELLITUS WITH OTHER DIABETIC NEUROLOGICAL COMPLICATION (HCC): ICD-10-CM

## 2023-08-25 DIAGNOSIS — I48.20 CHRONIC ATRIAL FIBRILLATION (HCC): ICD-10-CM

## 2023-08-25 DIAGNOSIS — Z00.00 ROUTINE GENERAL MEDICAL EXAMINATION AT A HEALTH CARE FACILITY: Primary | ICD-10-CM

## 2023-08-25 DIAGNOSIS — D68.69 SECONDARY HYPERCOAGULABLE STATE (HCC): ICD-10-CM

## 2023-08-25 PROCEDURE — 99214 OFFICE O/P EST MOD 30 MIN: CPT | Performed by: FAMILY MEDICINE

## 2023-08-25 SDOH — ECONOMIC STABILITY: FOOD INSECURITY: WITHIN THE PAST 12 MONTHS, YOU WORRIED THAT YOUR FOOD WOULD RUN OUT BEFORE YOU GOT MONEY TO BUY MORE.: NEVER TRUE

## 2023-08-25 SDOH — ECONOMIC STABILITY: INCOME INSECURITY: HOW HARD IS IT FOR YOU TO PAY FOR THE VERY BASICS LIKE FOOD, HOUSING, MEDICAL CARE, AND HEATING?: NOT HARD AT ALL

## 2023-08-25 SDOH — ECONOMIC STABILITY: FOOD INSECURITY: WITHIN THE PAST 12 MONTHS, THE FOOD YOU BOUGHT JUST DIDN'T LAST AND YOU DIDN'T HAVE MONEY TO GET MORE.: NEVER TRUE

## 2023-08-25 ASSESSMENT — PATIENT HEALTH QUESTIONNAIRE - PHQ9
6. FEELING BAD ABOUT YOURSELF - OR THAT YOU ARE A FAILURE OR HAVE LET YOURSELF OR YOUR FAMILY DOWN: 0
2. FEELING DOWN, DEPRESSED OR HOPELESS: 0
SUM OF ALL RESPONSES TO PHQ QUESTIONS 1-9: 0
8. MOVING OR SPEAKING SO SLOWLY THAT OTHER PEOPLE COULD HAVE NOTICED. OR THE OPPOSITE, BEING SO FIGETY OR RESTLESS THAT YOU HAVE BEEN MOVING AROUND A LOT MORE THAN USUAL: 0
10. IF YOU CHECKED OFF ANY PROBLEMS, HOW DIFFICULT HAVE THESE PROBLEMS MADE IT FOR YOU TO DO YOUR WORK, TAKE CARE OF THINGS AT HOME, OR GET ALONG WITH OTHER PEOPLE: 0
4. FEELING TIRED OR HAVING LITTLE ENERGY: 0
SUM OF ALL RESPONSES TO PHQ9 QUESTIONS 1 & 2: 0
9. THOUGHTS THAT YOU WOULD BE BETTER OFF DEAD, OR OF HURTING YOURSELF: 0
3. TROUBLE FALLING OR STAYING ASLEEP: 0
SUM OF ALL RESPONSES TO PHQ QUESTIONS 1-9: 0
5. POOR APPETITE OR OVEREATING: 0
SUM OF ALL RESPONSES TO PHQ QUESTIONS 1-9: 0
SUM OF ALL RESPONSES TO PHQ QUESTIONS 1-9: 0
7. TROUBLE CONCENTRATING ON THINGS, SUCH AS READING THE NEWSPAPER OR WATCHING TELEVISION: 0
1. LITTLE INTEREST OR PLEASURE IN DOING THINGS: 0

## 2023-08-25 ASSESSMENT — LIFESTYLE VARIABLES
HOW MANY STANDARD DRINKS CONTAINING ALCOHOL DO YOU HAVE ON A TYPICAL DAY: 1 OR 2
HOW OFTEN DO YOU HAVE A DRINK CONTAINING ALCOHOL: 2-4 TIMES A MONTH

## 2023-08-26 LAB
ALBUMIN SERPL-MCNC: 4 G/DL (ref 3.5–5)
ALBUMIN/GLOB SERPL: 1.1 (ref 1.1–2.2)
ALP SERPL-CCNC: 93 U/L (ref 45–117)
ALT SERPL-CCNC: 27 U/L (ref 12–78)
ANION GAP SERPL CALC-SCNC: 6 MMOL/L (ref 5–15)
AST SERPL-CCNC: 24 U/L (ref 15–37)
BASOPHILS # BLD: 0 K/UL (ref 0–0.1)
BASOPHILS NFR BLD: 1 % (ref 0–1)
BILIRUB SERPL-MCNC: 0.6 MG/DL (ref 0.2–1)
BUN SERPL-MCNC: 16 MG/DL (ref 6–20)
BUN/CREAT SERPL: 15 (ref 12–20)
CALCIUM SERPL-MCNC: 8.8 MG/DL (ref 8.5–10.1)
CHLORIDE SERPL-SCNC: 107 MMOL/L (ref 97–108)
CHOLEST SERPL-MCNC: 234 MG/DL
CO2 SERPL-SCNC: 23 MMOL/L (ref 21–32)
CREAT SERPL-MCNC: 1.07 MG/DL (ref 0.7–1.3)
DIFFERENTIAL METHOD BLD: NORMAL
EOSINOPHIL # BLD: 0.2 K/UL (ref 0–0.4)
EOSINOPHIL NFR BLD: 3 % (ref 0–7)
ERYTHROCYTE [DISTWIDTH] IN BLOOD BY AUTOMATED COUNT: 13.6 % (ref 11.5–14.5)
EST. AVERAGE GLUCOSE BLD GHB EST-MCNC: 131 MG/DL
GLOBULIN SER CALC-MCNC: 3.7 G/DL (ref 2–4)
GLUCOSE SERPL-MCNC: 120 MG/DL (ref 65–100)
HBA1C MFR BLD: 6.2 % (ref 4–5.6)
HCT VFR BLD AUTO: 41.3 % (ref 36.6–50.3)
HDLC SERPL-MCNC: 28 MG/DL
HDLC SERPL: 8.4 (ref 0–5)
HGB BLD-MCNC: 13.6 G/DL (ref 12.1–17)
IMM GRANULOCYTES # BLD AUTO: 0 K/UL (ref 0–0.04)
IMM GRANULOCYTES NFR BLD AUTO: 0 % (ref 0–0.5)
LDLC SERPL CALC-MCNC: 182.4 MG/DL (ref 0–100)
LYMPHOCYTES # BLD: 1 K/UL (ref 0.8–3.5)
LYMPHOCYTES NFR BLD: 18 % (ref 12–49)
MCH RBC QN AUTO: 30.8 PG (ref 26–34)
MCHC RBC AUTO-ENTMCNC: 32.9 G/DL (ref 30–36.5)
MCV RBC AUTO: 93.4 FL (ref 80–99)
MONOCYTES # BLD: 0.7 K/UL (ref 0–1)
MONOCYTES NFR BLD: 13 % (ref 5–13)
NEUTS SEG # BLD: 3.8 K/UL (ref 1.8–8)
NEUTS SEG NFR BLD: 65 % (ref 32–75)
NRBC # BLD: 0 K/UL (ref 0–0.01)
NRBC BLD-RTO: 0 PER 100 WBC
PLATELET # BLD AUTO: 227 K/UL (ref 150–400)
PMV BLD AUTO: 11 FL (ref 8.9–12.9)
POTASSIUM SERPL-SCNC: 4.5 MMOL/L (ref 3.5–5.1)
PROT SERPL-MCNC: 7.7 G/DL (ref 6.4–8.2)
RBC # BLD AUTO: 4.42 M/UL (ref 4.1–5.7)
SODIUM SERPL-SCNC: 136 MMOL/L (ref 136–145)
TRIGL SERPL-MCNC: 118 MG/DL
VLDLC SERPL CALC-MCNC: 23.6 MG/DL
WBC # BLD AUTO: 5.8 K/UL (ref 4.1–11.1)

## 2023-08-29 RX ORDER — LOVASTATIN 40 MG/1
40 TABLET ORAL NIGHTLY
Qty: 90 TABLET | Refills: 3 | Status: SHIPPED | OUTPATIENT
Start: 2023-08-29

## 2023-08-29 NOTE — TELEPHONE ENCOUNTER
verified. Informed pt of message from provider regarding labs. Pt verified understanding, pt went through his medication bottles and states it looks like he has been taking the gemfibrozil but states he does not have the lovastatin . Pt will need a refill on the lovastatin and sent to the [facility] in [place] point.

## 2023-08-29 NOTE — TELEPHONE ENCOUNTER
Labs reviewed. Cholesterol is quite high. Much higher than the last time it was checked. It is not as though you are not taking cholesterol medicine consistently or the cholesterol medicine you are taking is not strong enough    Double check that you have lovastatin and gemfibrozil. It is possible that you ran out of 1 of these medications without noticing.     If you have both lovastatin and gemfibrozil and are taking them consistently then I would switch lovastatin over to rosuvastatin

## 2023-09-13 RX ORDER — GEMFIBROZIL 600 MG/1
600 TABLET, FILM COATED ORAL 2 TIMES DAILY
Qty: 180 TABLET | Refills: 3 | Status: SHIPPED | OUTPATIENT
Start: 2023-09-13

## 2023-09-15 ENCOUNTER — NURSE ONLY (OUTPATIENT)
Age: 83
End: 2023-09-15
Payer: MEDICARE

## 2023-09-15 DIAGNOSIS — I48.20 CHRONIC ATRIAL FIBRILLATION (HCC): Primary | ICD-10-CM

## 2023-09-15 LAB
POC INR: 2.2
PROTHROMBIN TIME, POC: 26.9

## 2023-09-15 PROCEDURE — PBSHW AMB POC PT/INR: Performed by: FAMILY MEDICINE

## 2023-09-15 PROCEDURE — 85610 PROTHROMBIN TIME: CPT | Performed by: FAMILY MEDICINE

## 2023-09-15 RX ORDER — ATENOLOL 50 MG/1
50 TABLET ORAL DAILY
Qty: 90 TABLET | Refills: 3 | Status: SHIPPED | OUTPATIENT
Start: 2023-09-15

## 2023-09-15 NOTE — PROGRESS NOTES
Nhi Jiménez is a 82 y.o. male who presents today for Anticoagulation monitoring. Indication: atrial fibrillation  INR Goal: 2.0-3.0. Current dose:  Coumadin 5 mg daily, except 2.5 mg Thurs. Missed Coumadin Doses:  None  Medication Changes:  no  Dietary Changes:  no    Symptoms: taking coumadin appropriately without any bleeding. Latest INRs:  Lab Results   Component Value Date/Time    INR 2.4 08/18/2023 09:18 AM    INR 1.9 08/04/2023 09:23 AM    INR 2.1 07/05/2023 09:22 AM    INR 2.6 04/26/2023 09:21 AM    INR 2.9 04/05/2023 09:21 AM    INR 2.8 03/22/2023 09:31 AM        New Coumadin dose:.current treatment plan is effective, no change in therapy. Next check to be scheduled for  4 weeks.

## 2023-10-17 ENCOUNTER — NURSE ONLY (OUTPATIENT)
Age: 83
End: 2023-10-17
Payer: MEDICARE

## 2023-10-17 DIAGNOSIS — I48.20 CHRONIC ATRIAL FIBRILLATION (HCC): Primary | ICD-10-CM

## 2023-10-17 DIAGNOSIS — Z23 NEEDS FLU SHOT: ICD-10-CM

## 2023-10-17 LAB
POC INR: 3.3
PROTHROMBIN TIME, POC: 39.9

## 2023-10-17 PROCEDURE — 85610 PROTHROMBIN TIME: CPT

## 2023-10-17 PROCEDURE — 90694 VACC AIIV4 NO PRSRV 0.5ML IM: CPT

## 2023-10-17 PROCEDURE — PBSHW AMB POC PT/INR

## 2023-10-17 NOTE — PROGRESS NOTES
Fransisca Moody is a 82 y.o. male who presents today for Anticoagulation monitoring. Indication: atrial fibrillation  INR Goal: 2.0-3.0. Current dose:  Coumadin 5 mg daily, except 2.5 mg thurs  Missed Coumadin Doses:  None  Medication Changes:  no  Dietary Changes:  no    Symptoms: taking coumadin appropriately without any bleeding. Latest INRs:  Lab Results   Component Value Date/Time    INR 2.2 09/15/2023 09:58 AM    INR 2.4 08/18/2023 09:18 AM    INR 1.9 08/04/2023 09:23 AM    INR 2.6 04/26/2023 09:21 AM    INR 2.9 04/05/2023 09:21 AM    INR 2.8 03/22/2023 09:31 AM        New Coumadin dose:.hold two days,  5 mg daily, except 2.5 mg thursresume   Next check to be scheduled for  2 weeks.

## 2023-10-31 ENCOUNTER — NURSE ONLY (OUTPATIENT)
Age: 83
End: 2023-10-31
Payer: MEDICARE

## 2023-10-31 DIAGNOSIS — I48.20 CHRONIC ATRIAL FIBRILLATION (HCC): Primary | ICD-10-CM

## 2023-10-31 LAB
POC INR: 2.2
PROTHROMBIN TIME, POC: 26.6

## 2023-10-31 PROCEDURE — 85610 PROTHROMBIN TIME: CPT | Performed by: FAMILY MEDICINE

## 2023-10-31 PROCEDURE — PBSHW AMB POC PT/INR: Performed by: FAMILY MEDICINE

## 2023-10-31 NOTE — PROGRESS NOTES
Fransisca Moody who presents today for Anticoagulation monitoring. Indication: Atrial Fibrillation  INR Goal: 2.0-3.0. Today's INR:2.2  Previous INR:3.3 on 10/17/2023  Current dose: 5 mg daily expect 2.5 mg on Thursdays   Missed Coumadin Doses:  None  Medication Changes:  no  Dietary Changes:  no     Symptoms: taking coumadin appropriately without any bleeding. New Coumadin dose:.current treatment plan is effective, no change in therapy. Next check to be scheduled for 4 weeks.

## 2023-11-28 ENCOUNTER — NURSE ONLY (OUTPATIENT)
Age: 83
End: 2023-11-28
Payer: MEDICARE

## 2023-11-28 DIAGNOSIS — I48.20 CHRONIC ATRIAL FIBRILLATION (HCC): Primary | ICD-10-CM

## 2023-11-28 LAB
POC INR: 5.4
PROTHROMBIN TIME, POC: 64.4

## 2023-11-28 PROCEDURE — 85610 PROTHROMBIN TIME: CPT

## 2023-11-28 PROCEDURE — PBSHW AMB POC PT/INR

## 2023-12-05 ENCOUNTER — NURSE ONLY (OUTPATIENT)
Age: 83
End: 2023-12-05
Payer: MEDICARE

## 2023-12-05 DIAGNOSIS — I48.20 CHRONIC ATRIAL FIBRILLATION (HCC): Primary | ICD-10-CM

## 2023-12-05 LAB
POC INR: 1.5
PROTHROMBIN TIME, POC: 18.1

## 2023-12-05 PROCEDURE — PBSHW AMB POC PT/INR: Performed by: NURSE PRACTITIONER

## 2023-12-05 PROCEDURE — 85610 PROTHROMBIN TIME: CPT | Performed by: NURSE PRACTITIONER

## 2023-12-05 NOTE — PROGRESS NOTES
Balbina Mayen is a 82 y.o. male who presents today for Anticoagulation monitoring. Indication: atrial fibrillation  INR Goal: 2.0-3.0. Current dose:  2.5 mg every Mon, Wed, Fri; 5 mg all other days   Missed Coumadin Doses:  Held dose on 11/28, 11/29, and 11/30. Medication Changes:  no  Dietary Changes:  no    Symptoms: taking coumadin appropriately pt has had 3 nose bleeds last week. Latest INRs:  Lab Results   Component Value Date/Time    INR 1.5 12/05/2023 10:30 AM    INR 5.4 11/28/2023 09:03 AM    INR 2.2 10/31/2023 09:53 AM    INR 2.6 04/26/2023 09:21 AM    INR 2.9 04/05/2023 09:21 AM    INR 2.8 03/22/2023 09:31 AM        New Coumadin dose:.current treatment plan is effective, no change in therapy. Next check to be scheduled for  2 weeks.

## 2023-12-12 RX ORDER — PANTOPRAZOLE SODIUM 40 MG/1
TABLET, DELAYED RELEASE ORAL
Qty: 90 TABLET | Refills: 1 | Status: SHIPPED | OUTPATIENT
Start: 2023-12-12

## 2023-12-12 RX ORDER — LOSARTAN POTASSIUM 50 MG/1
50 TABLET ORAL DAILY
Qty: 90 TABLET | Refills: 1 | Status: SHIPPED | OUTPATIENT
Start: 2023-12-12

## 2024-01-09 ENCOUNTER — NURSE ONLY (OUTPATIENT)
Age: 84
End: 2024-01-09
Payer: MEDICARE

## 2024-01-09 DIAGNOSIS — I48.20 CHRONIC ATRIAL FIBRILLATION (HCC): Primary | ICD-10-CM

## 2024-01-09 LAB
POC INR: 3.7
PROTHROMBIN TIME, POC: 44.4

## 2024-01-09 PROCEDURE — 85610 PROTHROMBIN TIME: CPT

## 2024-01-09 PROCEDURE — PBSHW AMB POC PT/INR

## 2024-01-09 NOTE — PROGRESS NOTES
Edgar Wharton is a 83 y.o. male who presents today for Anticoagulation monitoring.    Indication: atrial fibrillation  INR Goal: 2.0-3.0.  Current dose:  Coumadin 2.5mg M W F  and 5mg all other days.  Missed Coumadin Doses:  None  Medication Changes:  no  Dietary Changes:  no    Symptoms: taking coumadin appropriately without any bleeding.    Latest INRs:  Lab Results   Component Value Date/Time    INR 2.3 12/19/2023 09:16 AM    INR 1.5 12/05/2023 10:30 AM    INR 5.4 11/28/2023 09:03 AM    INR 2.6 04/26/2023 09:21 AM    INR 2.9 04/05/2023 09:21 AM    INR 2.8 03/22/2023 09:31 AM        New Coumadin dose:.current treatment plan is effective, no change in therapy, the following changes are made - hold wed 1/10 2.5 mg recheck in 2 weeks.    Next check to be scheduled for  2 weeks.

## 2024-01-23 ENCOUNTER — NURSE ONLY (OUTPATIENT)
Age: 84
End: 2024-01-23
Payer: MEDICARE

## 2024-01-23 DIAGNOSIS — I48.20 CHRONIC ATRIAL FIBRILLATION (HCC): Primary | ICD-10-CM

## 2024-01-23 LAB
POC INR: 4.4
PROTHROMBIN TIME, POC: 52.9

## 2024-01-23 PROCEDURE — 85610 PROTHROMBIN TIME: CPT

## 2024-01-23 PROCEDURE — PBSHW AMB POC PT/INR

## 2024-01-23 NOTE — PROGRESS NOTES
Edgar Wharton is a 83 y.o. male who presents today for Anticoagulation monitoring.    Indication: atrial fibrillation  INR Goal: 2.0-3.0.  Current dose:  Coumadin 2.5 mg every Thursday 5 mg all other days   Missed Coumadin Doses:  Yes, held coumadin on 1/10/24.   Medication Changes:  no  Dietary Changes:  no    Symptoms: taking coumadin appropriately without any bleeding.    Latest INRs:  Lab Results   Component Value Date/Time    INR 3.7 01/09/2024 09:08 AM    INR 2.3 12/19/2023 09:16 AM    INR 1.5 12/05/2023 10:30 AM    INR 2.6 04/26/2023 09:21 AM    INR 2.9 04/05/2023 09:21 AM    INR 2.8 03/22/2023 09:31 AM        New Coumadin dose:. Hold tomorrows dose 1/24/24 then, 2.5mg Mon Wed and Fri and 5mg all other days     Next check to be scheduled for  1 weeks.

## 2024-01-30 ENCOUNTER — NURSE ONLY (OUTPATIENT)
Age: 84
End: 2024-01-30
Payer: MEDICARE

## 2024-01-30 DIAGNOSIS — I48.20 CHRONIC ATRIAL FIBRILLATION (HCC): Primary | ICD-10-CM

## 2024-01-30 LAB
POC INR: 3.4
PROTHROMBIN TIME, POC: 40.7

## 2024-01-30 PROCEDURE — 85610 PROTHROMBIN TIME: CPT

## 2024-01-30 PROCEDURE — PBSHW AMB POC PT/INR

## 2024-01-30 NOTE — PROGRESS NOTES
Edgar Wharton is a 83 y.o. male who presents today for Anticoagulation monitoring.    Indication: atrial fibrillation  INR Goal: 2.0-3.0.  Current dose:  Coumadin 2.5mg mon and wed and fri 5mg all other days.  Missed Coumadin Doses: no  Medication Changes:  no  Dietary Changes:  no    Symptoms: taking coumadin appropriately without any bleeding.    Latest INRs:  Lab Results   Component Value Date/Time    INR 4.4 01/23/2024 09:16 AM    INR 3.7 01/09/2024 09:08 AM    INR 2.3 12/19/2023 09:16 AM    INR 2.6 04/26/2023 09:21 AM    INR 2.9 04/05/2023 09:21 AM    INR 2.8 03/22/2023 09:31 AM        New Coumadin dose:. the following changes are made - M,TUE,W,F take 2.5mg and Thursday, Sat, Sun take 5mg.    Next check to be scheduled for  1 weeks.

## 2024-02-05 DIAGNOSIS — I48.20 CHRONIC ATRIAL FIBRILLATION (HCC): ICD-10-CM

## 2024-02-05 RX ORDER — CITALOPRAM 20 MG/1
TABLET ORAL
Qty: 90 TABLET | Refills: 1 | Status: SHIPPED | OUTPATIENT
Start: 2024-02-05

## 2024-02-05 RX ORDER — WARFARIN SODIUM 5 MG/1
TABLET ORAL
Qty: 90 TABLET | Refills: 1 | Status: SHIPPED | OUTPATIENT
Start: 2024-02-05

## 2024-02-06 ENCOUNTER — NURSE ONLY (OUTPATIENT)
Age: 84
End: 2024-02-06
Payer: MEDICARE

## 2024-02-06 DIAGNOSIS — I48.20 CHRONIC ATRIAL FIBRILLATION (HCC): Primary | ICD-10-CM

## 2024-02-06 LAB
POC INR: 2.3
PROTHROMBIN TIME, POC: 27.3

## 2024-02-06 PROCEDURE — PBSHW AMB POC PT/INR

## 2024-02-06 PROCEDURE — 85610 PROTHROMBIN TIME: CPT

## 2024-02-06 NOTE — PROGRESS NOTES
Edgar Wharton is a 83 y.o. male who presents today for Anticoagulation monitoring.    Indication: atrial fibrillation  INR Goal: 2.0-3.0.  Current dose:  Coumadin M,TUE,W,F take 2.5mg and Thursday, Sat, Sun take 5mg.   Missed Coumadin Doses:  None  Medication Changes:  no  Dietary Changes:  no    Symptoms: taking coumadin appropriately without any bleeding.    Latest INRs:  Lab Results   Component Value Date/Time    INR 3.4 01/30/2024 09:03 AM    INR 4.4 01/23/2024 09:16 AM    INR 3.7 01/09/2024 09:08 AM    INR 2.6 04/26/2023 09:21 AM    INR 2.9 04/05/2023 09:21 AM    INR 2.8 03/22/2023 09:31 AM        New Coumadin dose:.current treatment plan is effective, no change in therapy.    Next check to be scheduled for  2 weeks.

## 2024-02-08 RX ORDER — TAMSULOSIN HYDROCHLORIDE 0.4 MG/1
CAPSULE ORAL
Qty: 90 CAPSULE | Refills: 1 | Status: SHIPPED | OUTPATIENT
Start: 2024-02-08

## 2024-02-11 ENCOUNTER — APPOINTMENT (OUTPATIENT)
Facility: HOSPITAL | Age: 84
End: 2024-02-11
Payer: MEDICARE

## 2024-02-11 ENCOUNTER — HOSPITAL ENCOUNTER (EMERGENCY)
Facility: HOSPITAL | Age: 84
Discharge: HOME | End: 2024-02-11
Attending: EMERGENCY MEDICINE
Payer: MEDICARE

## 2024-02-11 VITALS
RESPIRATION RATE: 18 BRPM | SYSTOLIC BLOOD PRESSURE: 108 MMHG | BODY MASS INDEX: 33.86 KG/M2 | HEART RATE: 75 BPM | OXYGEN SATURATION: 95 % | HEIGHT: 71 IN | WEIGHT: 241.84 LBS | DIASTOLIC BLOOD PRESSURE: 73 MMHG | TEMPERATURE: 97.9 F

## 2024-02-11 DIAGNOSIS — S20.211A RIB CONTUSION, RIGHT, INITIAL ENCOUNTER: Primary | ICD-10-CM

## 2024-02-11 PROCEDURE — 99283 EMERGENCY DEPT VISIT LOW MDM: CPT

## 2024-02-11 PROCEDURE — 71101 X-RAY EXAM UNILAT RIBS/CHEST: CPT

## 2024-02-11 NOTE — DISCHARGE INSTRUCTIONS
It was a pleasure taking care of you at [facility] Emergency Department today.  We know that when you come to [facility], you are entrusting us with your health, comfort, and safety.  Our physicians and nurses honor that trust, and we truly appreciate the opportunity to care for you and your loved ones.      We also value your feedback.  If you receive a survey about your Emergency Department experience today, please fill it out.  We care about our patients' feedback, and we listen to what you have to say.  Thank you!

## 2024-02-11 NOTE — ED PROVIDER NOTES
EMERGENCY DEPARTMENT HISTORY AND PHYSICAL EXAM    Date: 2024  Patient Name: Edgar Wharton  Patient Age and Sex: 83 y.o. male  MRN:  [id number]  CSN:  [id number]    History of Present Illness     Chief Complaint   Patient presents with    Fall      Yesterday late he was working on daughters , backed it in garage, and fell off of it face first, with his feet left on the . Landed mostly on his pacemaker, which \"is new.\" No loc. Skinned his knees, and he's concerned as he feels like he has fluid build up today.        History Provided By: Patient    Ability to gather history was limited by:     HPI: Edgar Wharton, 83 y.o. male   With history of atrial fibrillation, on Coumadin, complains of pain around his right anterior and lateral ribs, in the setting of falling off of a riding lawnmower yesterday.  He denies any head injury.  He also complains of mild pain in his right knee but reports normal range of motion.  His primary concern is pain when taking a deep breath and tenderness along the right anterior and lateral ribs.  He has not noticed any bruising.  His symptoms overall are mild and he declines any pain medications.      Tobacco Use      Smoking status: Former        Packs/day: 0.00        Years: 2.0 packs/day for 13.0 years (26.0 ttl pk-yrs)        Types: Cigarettes        Start date: 1966        Quit date: 1979        Years since quittin.1      Smokeless tobacco: Never     Past History   The patient's medical, surgical, and social history were reviewed by me today.    Current Medications:  No current facility-administered medications on file prior to encounter.     Current Outpatient Medications on File Prior to Encounter   Medication Sig Dispense Refill    tamsulosin (FLOMAX) 0.4 MG capsule TAKE 1 CAPSULE BY MOUTH DAILY 90 capsule 1    warfarin (COUMADIN) 5 MG tablet TAKE 1 TABLET(5 MG) BY MOUTH DAILY. EXCEPT 2.5 MG HALF TABLET THURS/ SATURDAY 90 tablet 1

## 2024-02-20 ENCOUNTER — NURSE ONLY (OUTPATIENT)
Age: 84
End: 2024-02-20
Payer: MEDICARE

## 2024-02-20 DIAGNOSIS — I48.20 CHRONIC ATRIAL FIBRILLATION (HCC): Primary | ICD-10-CM

## 2024-02-20 LAB
POC INR: 2.1
PROTHROMBIN TIME, POC: 24.7

## 2024-02-20 PROCEDURE — PBSHW AMB POC PT/INR: Performed by: FAMILY MEDICINE

## 2024-02-20 PROCEDURE — 85610 PROTHROMBIN TIME: CPT | Performed by: FAMILY MEDICINE

## 2024-02-20 NOTE — PROGRESS NOTES
Edgar Wharton is a 83 y.o. male who presents today for Anticoagulation monitoring.    Indication: atrial fibrillation  INR Goal: 2.0-3.0.  Current dose:  Coumadin  M,TUE,W,F take 2.5mg and Thursday, Sat, Sun take 5mg.    Missed Coumadin Doses:  None  Medication Changes:  no  Dietary Changes:  no    Symptoms: taking coumadin appropriately without any bleeding.    Latest INRs:  Lab Results   Component Value Date/Time    INR 2.3 02/06/2024 09:06 AM    INR 3.4 01/30/2024 09:03 AM    INR 4.4 01/23/2024 09:16 AM    INR 2.6 04/26/2023 09:21 AM    INR 2.9 04/05/2023 09:21 AM    INR 2.8 03/22/2023 09:31 AM        New Coumadin dose:.current treatment plan is effective, no change in therapy.    Next check to be scheduled for  4 weeks.

## 2024-03-19 ENCOUNTER — NURSE ONLY (OUTPATIENT)
Age: 84
End: 2024-03-19
Payer: MEDICARE

## 2024-03-19 DIAGNOSIS — I48.20 CHRONIC ATRIAL FIBRILLATION (HCC): Primary | ICD-10-CM

## 2024-03-19 LAB
POC INR: 2.2
PROTHROMBIN TIME, POC: 26.1

## 2024-03-19 PROCEDURE — 85610 PROTHROMBIN TIME: CPT | Performed by: FAMILY MEDICINE

## 2024-03-19 PROCEDURE — PBSHW AMB POC PT/INR: Performed by: FAMILY MEDICINE

## 2024-03-19 NOTE — PROGRESS NOTES
Edgar Wharton is a 83 y.o. male who presents today for Anticoagulation monitoring.    Indication: atrial fibrillation  INR Goal: 2.0-3.0.  Current dose:   M,TUE,W,F take 2.5mg and Thursday, Sat, Sun take 5mg.     Missed Coumadin Doses:  None  Medication Changes:  no  Dietary Changes:  no    Symptoms: taking coumadin appropriately without any bleeding.    Latest INRs:  Lab Results   Component Value Date/Time    INR 2.1 02/20/2024 09:02 AM    INR 2.3 02/06/2024 09:06 AM    INR 3.4 01/30/2024 09:03 AM    INR 2.6 04/26/2023 09:21 AM    INR 2.9 04/05/2023 09:21 AM    INR 2.8 03/22/2023 09:31 AM        New Coumadin dose:.current treatment plan is effective, no change in therapy.    Next check to be scheduled for  4 weeks.

## 2024-04-17 ENCOUNTER — NURSE ONLY (OUTPATIENT)
Age: 84
End: 2024-04-17
Payer: MEDICARE

## 2024-04-17 DIAGNOSIS — I48.20 CHRONIC ATRIAL FIBRILLATION (HCC): Primary | ICD-10-CM

## 2024-04-17 LAB
POC INR: 2.1
PROTHROMBIN TIME, POC: 25.6

## 2024-04-17 PROCEDURE — 85610 PROTHROMBIN TIME: CPT | Performed by: FAMILY MEDICINE

## 2024-04-17 PROCEDURE — PBSHW AMB POC PT/INR: Performed by: FAMILY MEDICINE

## 2024-04-17 NOTE — PROGRESS NOTES
Edgar Wharton who presents today for Anticoagulation monitoring.    Indication: Atrial Fibrillation  INR Goal: 2.0-3.0.  Today's INR:2.1  Previous INR:2.2  Current dose: 5 mg every Sunday, Thursday and Sat, 2.5 mg all other days   Missed Coumadin Doses:  None  Medication Changes:  no  Dietary Changes:  no     Symptoms: taking coumadin appropriately without any bleeding.       New Coumadin dose:.current treatment plan is effective, no change in therapy.     Next check to be scheduled for 4 weeks.

## 2024-05-17 ENCOUNTER — NURSE ONLY (OUTPATIENT)
Age: 84
End: 2024-05-17
Payer: MEDICARE

## 2024-05-17 DIAGNOSIS — I48.20 CHRONIC ATRIAL FIBRILLATION (HCC): Primary | ICD-10-CM

## 2024-05-17 LAB
POC INR: 2.3
PROTHROMBIN TIME, POC: 27.3

## 2024-05-17 PROCEDURE — PBSHW AMB POC PT/INR: Performed by: FAMILY MEDICINE

## 2024-05-17 PROCEDURE — 85610 PROTHROMBIN TIME: CPT | Performed by: FAMILY MEDICINE

## 2024-05-17 NOTE — PROGRESS NOTES
Edgar Wharton who presents today for Anticoagulation monitoring.    Indication: Atrial Fibrillation  INR Goal: 2.0-3.0.  Today's INR: 2.3  Previous INR: 2.1 4/17/2024  Current dose: 5 Mg every Sun, Thurs, Sat; 2.5 mg all other days   Missed Coumadin Doses:  None   Medication Changes:  no  Dietary Changes:  no     Symptoms: taking coumadin appropriately without any bleeding.       New Coumadin dose:.current treatment plan is effective, no change in therapy.     Next check to be scheduled for 4 weeks.

## 2024-06-10 RX ORDER — LOSARTAN POTASSIUM 50 MG/1
50 TABLET ORAL DAILY
Qty: 90 TABLET | Refills: 1 | Status: SHIPPED | OUTPATIENT
Start: 2024-06-10

## 2024-06-11 RX ORDER — PANTOPRAZOLE SODIUM 40 MG/1
40 TABLET, DELAYED RELEASE ORAL EVERY EVENING
Qty: 90 TABLET | Refills: 1 | Status: SHIPPED | OUTPATIENT
Start: 2024-06-11

## 2024-06-11 NOTE — TELEPHONE ENCOUNTER
We received a fax refill request for Edgar Wharton.  Please escribe pantoprazole (PROTONIX) 40 MG tablet  to their pharmacy.  The pharmacy is correct in the chart and they are requesting a 90 day supply.

## 2024-06-18 ENCOUNTER — NURSE ONLY (OUTPATIENT)
Age: 84
End: 2024-06-18
Payer: MEDICARE

## 2024-06-18 DIAGNOSIS — I48.20 CHRONIC ATRIAL FIBRILLATION (HCC): Primary | ICD-10-CM

## 2024-06-18 LAB
POC INR: 2.1
PROTHROMBIN TIME, POC: 25.2

## 2024-06-18 PROCEDURE — 85610 PROTHROMBIN TIME: CPT | Performed by: FAMILY MEDICINE

## 2024-06-18 PROCEDURE — PBSHW AMB POC PT/INR: Performed by: FAMILY MEDICINE

## 2024-06-18 NOTE — PROGRESS NOTES
Edgar Wharton is a 84 y.o. male who presents today for Anticoagulation monitoring.    Indication: atrial fibrillation  INR Goal: 2.0-3.0.  Current dose:  Coumadin 5 Mg every Sun, Thurs, Sat; 2.5 mg all other days 0  Missed Coumadin Doses:  None  Medication Changes:  no  Dietary Changes:  no    Symptoms: taking coumadin appropriately without any bleeding.    Latest INRs:  Lab Results   Component Value Date/Time    INR 2.3 05/17/2024 09:20 AM    INR 2.1 04/17/2024 09:01 AM    INR 2.2 03/19/2024 09:49 AM    INR 2.6 04/26/2023 09:21 AM    INR 2.9 04/05/2023 09:21 AM    INR 2.8 03/22/2023 09:31 AM        New Coumadin dose:.current treatment plan is effective, no change in therapy.    Next check to be scheduled for  4 weeks.

## 2024-07-15 NOTE — PERIOP NOTES
PC from Dr Jeremiah Mac stating the previously faxed \"Request for Medical Clearance \" Document is signed and dated by the Cardiologist Dr Christie Fernandez and that Dr Andrea Foss is accepting this as the pt's  Clearance Note for joint replacement surgery on 4/9/2019.  Andrea FRIEDMAN PACU ANESTHESIA ADMISSION NOTE      Procedure: Excision of right cheek melanoma, coverage with local flap   Post op diagnosis:  Melanoma in situ of cheek        ____  Intubated  TV:______       Rate: ______      FiO2: ______    _x___  Patent Airway    _x___  Full return of protective reflexes    _x___  Full recovery from anesthesia / back to baseline status    Vitals:  T(F): 97.8  HR: 77  BP: 121/55  RR: 16  SpO2: 95%    Mental Status:  _x___ Awake   x_____ Alert   _____ Drowsy   _____ Sedated    Nausea/Vomiting:  _x___  NO       ______Yes,   See Post - Op Orders         Pain Scale (0-10):  __0___    Treatment: _x___ None    ____ See Post - Op/PCA Orders    Post - Operative Fluids:   __x__ Oral   ____ See Post - Op Orders    Plan: Discharge:   _x___Home       _____Floor     _____Critical Care    _____  Other:_________________    Comments:  No anesthesia issues or complications noted.  Discharge when criteria met.

## 2024-07-24 ENCOUNTER — NURSE ONLY (OUTPATIENT)
Age: 84
End: 2024-07-24
Payer: MEDICARE

## 2024-07-24 DIAGNOSIS — I48.20 CHRONIC ATRIAL FIBRILLATION (HCC): Primary | ICD-10-CM

## 2024-07-24 LAB
POC INR: 1.6
PROTHROMBIN TIME, POC: 19.5

## 2024-07-24 PROCEDURE — PBSHW AMB POC PT/INR: Performed by: FAMILY MEDICINE

## 2024-07-24 PROCEDURE — 85610 PROTHROMBIN TIME: CPT | Performed by: FAMILY MEDICINE

## 2024-07-24 NOTE — PROGRESS NOTES
Edgar Wharton is a 84 y.o. male who presents today for Anticoagulation monitoring.    Indication: atrial fibrillation  INR Goal: 2.0-3.0.  Current dose:  Coumadin 2.5 mg daily, except 5 mg Thur/Sat/Sun.  Missed Coumadin Doses:  None  Medication Changes:  no  Dietary Changes:  no    Symptoms: taking coumadin appropriately without any bleeding.    Latest INRs:  Lab Results   Component Value Date/Time    INR 1.6 07/24/2024 09:04 AM    INR 2.1 06/18/2024 09:31 AM    INR 2.3 05/17/2024 09:20 AM    INR 2.6 04/26/2023 09:21 AM    INR 2.9 04/05/2023 09:21 AM    INR 2.8 03/22/2023 09:31 AM        New Coumadin dose:.current treatment plan is effective, no change in therapy, the following changes are made - see anti-coag calendar.    Next check to be scheduled for  2 weeks.

## 2024-07-31 RX ORDER — CITALOPRAM 20 MG/1
TABLET ORAL
Qty: 90 TABLET | Refills: 0 | Status: SHIPPED | OUTPATIENT
Start: 2024-07-31

## 2024-07-31 RX ORDER — TAMSULOSIN HYDROCHLORIDE 0.4 MG/1
CAPSULE ORAL
Qty: 90 CAPSULE | Refills: 0 | Status: SHIPPED | OUTPATIENT
Start: 2024-07-31

## 2024-08-08 ENCOUNTER — NURSE ONLY (OUTPATIENT)
Age: 84
End: 2024-08-08
Payer: MEDICARE

## 2024-08-08 DIAGNOSIS — I48.20 CHRONIC ATRIAL FIBRILLATION (HCC): Primary | ICD-10-CM

## 2024-08-08 LAB
POC INR: 1.5
PROTHROMBIN TIME, POC: 18.1

## 2024-08-08 PROCEDURE — 85610 PROTHROMBIN TIME: CPT | Performed by: FAMILY MEDICINE

## 2024-08-08 PROCEDURE — PBSHW AMB POC PT/INR: Performed by: FAMILY MEDICINE

## 2024-08-08 NOTE — PROGRESS NOTES
Edgar Wharton is a 84 y.o. male who presents today for Anticoagulation monitoring.    Indication: atrial fibrillation  INR Goal: 2.0-3.0.  Current dose:  Coumadin   2.5 mg every Mon, Wed, Fri; 5 mg all other days   Missed Coumadin Doses:  None  Medication Changes:  no  Dietary Changes:  no    Symptoms: taking coumadin appropriately without any bleeding.    Latest INRs:  Lab Results   Component Value Date/Time    INR 1.6 07/24/2024 09:04 AM    INR 2.1 06/18/2024 09:31 AM    INR 2.3 05/17/2024 09:20 AM    INR 2.6 04/26/2023 09:21 AM    INR 2.9 04/05/2023 09:21 AM    INR 2.8 03/22/2023 09:31 AM        New Coumadin dose:.changes are made - wed and fri take 2.5 mg and 5 mg all other days.    Next check to be scheduled for  2 weeks.

## 2024-08-16 RX ORDER — LOVASTATIN 40 MG/1
40 TABLET ORAL NIGHTLY
Qty: 90 TABLET | Refills: 0 | Status: SHIPPED | OUTPATIENT
Start: 2024-08-16

## 2024-08-22 ENCOUNTER — NURSE ONLY (OUTPATIENT)
Age: 84
End: 2024-08-22
Payer: MEDICARE

## 2024-08-22 DIAGNOSIS — I48.20 CHRONIC ATRIAL FIBRILLATION (HCC): Primary | ICD-10-CM

## 2024-08-22 LAB
POC INR: 2.4
PROTHROMBIN TIME, POC: 28.5

## 2024-08-22 PROCEDURE — PBSHW AMB POC PT/INR: Performed by: FAMILY MEDICINE

## 2024-08-22 PROCEDURE — 85610 PROTHROMBIN TIME: CPT | Performed by: FAMILY MEDICINE

## 2024-08-22 NOTE — PROGRESS NOTES
Edgar Wharton is a 84 y.o. male who presents today for Anticoagulation monitoring.    Indication: atrial fibrillation  INR Goal: 2.0-3.0.  Current dose:  wed and fri take 2.5 mg and 5 mg all other days.   Missed Coumadin Doses:  None  Medication Changes:  no  Dietary Changes:  no    Symptoms: taking coumadin appropriately without any bleeding.    Latest INRs:  Lab Results   Component Value Date/Time    INR 1.5 08/08/2024 09:56 AM    INR 1.6 07/24/2024 09:04 AM    INR 2.1 06/18/2024 09:31 AM    INR 2.6 04/26/2023 09:21 AM    INR 2.9 04/05/2023 09:21 AM    INR 2.8 03/22/2023 09:31 AM        New Coumadin dose:.current treatment plan is effective, no change in therapy.    Next check to be scheduled for  4 weeks.

## 2024-08-24 DIAGNOSIS — F41.1 GENERALIZED ANXIETY DISORDER: Primary | ICD-10-CM

## 2024-08-26 RX ORDER — TAMSULOSIN HYDROCHLORIDE 0.4 MG/1
CAPSULE ORAL
Qty: 90 CAPSULE | Refills: 0 | Status: SHIPPED | OUTPATIENT
Start: 2024-08-26

## 2024-08-26 RX ORDER — CITALOPRAM HYDROBROMIDE 20 MG/1
TABLET ORAL
Qty: 90 TABLET | Refills: 0 | Status: SHIPPED | OUTPATIENT
Start: 2024-08-26

## 2024-09-04 RX ORDER — GEMFIBROZIL 600 MG/1
600 TABLET, FILM COATED ORAL 2 TIMES DAILY
Qty: 180 TABLET | Refills: 3 | Status: SHIPPED | OUTPATIENT
Start: 2024-09-04

## 2024-09-19 ENCOUNTER — NURSE ONLY (OUTPATIENT)
Age: 84
End: 2024-09-19
Payer: MEDICARE

## 2024-09-19 DIAGNOSIS — I48.20 CHRONIC ATRIAL FIBRILLATION (HCC): Primary | ICD-10-CM

## 2024-09-19 LAB
POC INR: 2.2
PROTHROMBIN TIME, POC: 26.1

## 2024-09-19 PROCEDURE — PBSHW AMB POC PT/INR: Performed by: FAMILY MEDICINE

## 2024-09-19 PROCEDURE — 85610 PROTHROMBIN TIME: CPT | Performed by: FAMILY MEDICINE

## 2024-10-15 DIAGNOSIS — I48.20 CHRONIC ATRIAL FIBRILLATION (HCC): ICD-10-CM

## 2024-10-15 RX ORDER — WARFARIN SODIUM 5 MG/1
TABLET ORAL
Qty: 90 TABLET | Refills: 1 | Status: SHIPPED | OUTPATIENT
Start: 2024-10-15

## 2024-10-18 ENCOUNTER — NURSE ONLY (OUTPATIENT)
Age: 84
End: 2024-10-18
Payer: MEDICARE

## 2024-10-18 DIAGNOSIS — I48.20 CHRONIC ATRIAL FIBRILLATION (HCC): Primary | ICD-10-CM

## 2024-10-18 LAB
POC INR: 1
PROTHROMBIN TIME, POC: 12.5

## 2024-10-18 PROCEDURE — 85610 PROTHROMBIN TIME: CPT | Performed by: FAMILY MEDICINE

## 2024-10-18 PROCEDURE — PBSHW AMB POC PT/INR: Performed by: FAMILY MEDICINE

## 2024-10-18 NOTE — PROGRESS NOTES
Edgar Wharton is a 84 y.o. male who presents today for Anticoagulation monitoring.    Indication: atrial fibrillation  INR Goal: 2.0-3.0.  Current dose:  Coumadin wed and fri take 2.5 mg and 5 mg all other days   Missed Coumadin Doses:  None  Medication Changes:  no  Dietary Changes:  Patient ate a big serving of greens on 10/14 & 10/15.    Symptoms: taking coumadin appropriately without any bleeding.    Latest INRs:  Lab Results   Component Value Date/Time    INR 2.2 09/19/2024 08:49 AM    INR 2.4 08/22/2024 09:13 AM    INR 1.5 08/08/2024 09:56 AM    INR 2.6 04/26/2023 09:21 AM    INR 2.9 04/05/2023 09:21 AM    INR 2.8 03/22/2023 09:31 AM        New Coumadin dose:.current treatment plan is effective, no change in therapy.    Next check to be scheduled for  1 weeks.

## 2024-10-25 ENCOUNTER — NURSE ONLY (OUTPATIENT)
Age: 84
End: 2024-10-25
Payer: MEDICARE

## 2024-10-25 DIAGNOSIS — Z23 NEED FOR IMMUNIZATION AGAINST INFLUENZA: ICD-10-CM

## 2024-10-25 DIAGNOSIS — I48.20 CHRONIC ATRIAL FIBRILLATION (HCC): Primary | ICD-10-CM

## 2024-10-25 LAB
POC INR: 2
PROTHROMBIN TIME, POC: 24.5

## 2024-10-25 PROCEDURE — 85610 PROTHROMBIN TIME: CPT | Performed by: FAMILY MEDICINE

## 2024-10-25 PROCEDURE — PBSHW INFLUENZA, FLUAD TRIVALENT, (AGE 65 Y+), IM, PRESERVATIVE FREE, 0.5ML: Performed by: FAMILY MEDICINE

## 2024-10-25 PROCEDURE — 90653 IIV ADJUVANT VACCINE IM: CPT | Performed by: FAMILY MEDICINE

## 2024-10-25 PROCEDURE — PBSHW AMB POC PT/INR: Performed by: FAMILY MEDICINE

## 2024-10-25 NOTE — PROGRESS NOTES
Edgar Wharton is a 84 y.o. male who presents today for Anticoagulation monitoring.    Indication: atrial fibrillation  INR Goal: 2.0-3.0.  Current dose:  Coumadin wed and fri take 2.5 mg and 5 mg all other days   Missed Coumadin Doses:  None  Medication Changes:  no  Dietary Changes:  no    Symptoms: taking coumadin appropriately without any bleeding.    Latest INRs:  Lab Results   Component Value Date/Time    INR 1.0 10/18/2024 09:19 AM    INR 2.2 09/19/2024 08:49 AM    INR 2.4 08/22/2024 09:13 AM    INR 2.6 04/26/2023 09:21 AM    INR 2.9 04/05/2023 09:21 AM    INR 2.8 03/22/2023 09:31 AM        New Coumadin dose:.current treatment plan is effective, no change in therapy.    Next check to be scheduled for  4 weeks.

## 2024-11-19 DIAGNOSIS — F41.1 GENERALIZED ANXIETY DISORDER: ICD-10-CM

## 2024-11-20 RX ORDER — CITALOPRAM HYDROBROMIDE 20 MG/1
TABLET ORAL
Qty: 90 TABLET | Refills: 0 | Status: SHIPPED | OUTPATIENT
Start: 2024-11-20

## 2024-11-22 RX ORDER — LOVASTATIN 40 MG/1
40 TABLET ORAL NIGHTLY
Qty: 90 TABLET | Refills: 0 | Status: SHIPPED | OUTPATIENT
Start: 2024-11-22

## 2024-11-25 ENCOUNTER — NURSE ONLY (OUTPATIENT)
Age: 84
End: 2024-11-25
Payer: MEDICARE

## 2024-11-25 DIAGNOSIS — I48.20 CHRONIC ATRIAL FIBRILLATION (HCC): Primary | ICD-10-CM

## 2024-11-25 LAB
POC INR: 2.1
PROTHROMBIN TIME, POC: 24.8

## 2024-11-25 PROCEDURE — 85610 PROTHROMBIN TIME: CPT | Performed by: FAMILY MEDICINE

## 2024-11-25 PROCEDURE — PBSHW AMB POC PT/INR: Performed by: FAMILY MEDICINE

## 2024-11-25 NOTE — PROGRESS NOTES
Edgar Wharton is a 84 y.o. male who presents today for Anticoagulation monitoring.    Indication: atrial fibrillation  INR Goal: 2.0-3.0.  Current dose:  Coumadin 5 mg daily, except 2.5 mg Wed/Fri.  Missed Coumadin Doses:  None  Medication Changes:  no  Dietary Changes:  yes - Had some cabbage stew Sunday night.    Symptoms: taking coumadin appropriately without any bleeding.    Latest INRs:  Lab Results   Component Value Date/Time    INR 2.1 11/25/2024 09:04 AM    INR 2.0 10/25/2024 09:17 AM    INR 1.0 10/18/2024 09:19 AM    INR 2.6 04/26/2023 09:21 AM    INR 2.9 04/05/2023 09:21 AM    INR 2.8 03/22/2023 09:31 AM        New Coumadin dose:.current treatment plan is effective, no change in therapy.    Next check to be scheduled for  4 weeks.

## 2024-12-03 RX ORDER — PANTOPRAZOLE SODIUM 40 MG/1
40 TABLET, DELAYED RELEASE ORAL EVERY EVENING
Qty: 90 TABLET | Refills: 0 | Status: SHIPPED | OUTPATIENT
Start: 2024-12-03

## 2024-12-13 RX ORDER — ATENOLOL 50 MG/1
50 TABLET ORAL DAILY
Qty: 90 TABLET | Refills: 0 | Status: SHIPPED | OUTPATIENT
Start: 2024-12-13

## 2024-12-16 RX ORDER — LOSARTAN POTASSIUM 50 MG/1
50 TABLET ORAL DAILY
Qty: 90 TABLET | Refills: 0 | Status: SHIPPED | OUTPATIENT
Start: 2024-12-16

## 2024-12-20 ENCOUNTER — OFFICE VISIT (OUTPATIENT)
Age: 84
End: 2024-12-20
Payer: MEDICARE

## 2024-12-20 VITALS
DIASTOLIC BLOOD PRESSURE: 77 MMHG | HEIGHT: 71 IN | OXYGEN SATURATION: 96 % | SYSTOLIC BLOOD PRESSURE: 125 MMHG | RESPIRATION RATE: 17 BRPM | WEIGHT: 237 LBS | TEMPERATURE: 98 F | BODY MASS INDEX: 33.18 KG/M2 | HEART RATE: 70 BPM

## 2024-12-20 DIAGNOSIS — E11.9 TYPE 2 DIABETES MELLITUS WITHOUT COMPLICATION, WITHOUT LONG-TERM CURRENT USE OF INSULIN (HCC): ICD-10-CM

## 2024-12-20 DIAGNOSIS — I10 BENIGN HYPERTENSION: ICD-10-CM

## 2024-12-20 DIAGNOSIS — Z00.00 ROUTINE GENERAL MEDICAL EXAMINATION AT A HEALTH CARE FACILITY: Primary | ICD-10-CM

## 2024-12-20 DIAGNOSIS — E11.49 TYPE 2 DIABETES MELLITUS WITH OTHER DIABETIC NEUROLOGICAL COMPLICATION (HCC): ICD-10-CM

## 2024-12-20 DIAGNOSIS — D68.69 SECONDARY HYPERCOAGULABLE STATE (HCC): ICD-10-CM

## 2024-12-20 DIAGNOSIS — E53.8 B12 DEFICIENCY: ICD-10-CM

## 2024-12-20 DIAGNOSIS — Z79.01 LONG TERM (CURRENT) USE OF ANTICOAGULANTS: ICD-10-CM

## 2024-12-20 DIAGNOSIS — J44.9 CHRONIC OBSTRUCTIVE PULMONARY DISEASE, UNSPECIFIED COPD TYPE (HCC): ICD-10-CM

## 2024-12-20 DIAGNOSIS — E55.9 VITAMIN D DEFICIENCY: ICD-10-CM

## 2024-12-20 DIAGNOSIS — I73.9 PERIPHERAL VASCULAR DISEASE, UNSPECIFIED (HCC): ICD-10-CM

## 2024-12-20 DIAGNOSIS — I48.20 CHRONIC ATRIAL FIBRILLATION (HCC): ICD-10-CM

## 2024-12-20 LAB
POC INR: 2.3
PROTHROMBIN TIME, POC: 27.9

## 2024-12-20 PROCEDURE — G8482 FLU IMMUNIZE ORDER/ADMIN: HCPCS | Performed by: FAMILY MEDICINE

## 2024-12-20 PROCEDURE — 1159F MED LIST DOCD IN RCRD: CPT | Performed by: FAMILY MEDICINE

## 2024-12-20 PROCEDURE — 3074F SYST BP LT 130 MM HG: CPT | Performed by: FAMILY MEDICINE

## 2024-12-20 PROCEDURE — 1123F ACP DISCUSS/DSCN MKR DOCD: CPT | Performed by: FAMILY MEDICINE

## 2024-12-20 PROCEDURE — 3078F DIAST BP <80 MM HG: CPT | Performed by: FAMILY MEDICINE

## 2024-12-20 PROCEDURE — G0439 PPPS, SUBSEQ VISIT: HCPCS | Performed by: FAMILY MEDICINE

## 2024-12-20 PROCEDURE — 85610 PROTHROMBIN TIME: CPT | Performed by: FAMILY MEDICINE

## 2024-12-20 PROCEDURE — PBSHW AMB POC PT/INR: Performed by: FAMILY MEDICINE

## 2024-12-20 PROCEDURE — 1126F AMNT PAIN NOTED NONE PRSNT: CPT | Performed by: FAMILY MEDICINE

## 2024-12-20 RX ORDER — POTASSIUM CHLORIDE 1500 MG/1
20 TABLET, EXTENDED RELEASE ORAL DAILY
COMMUNITY

## 2024-12-20 RX ORDER — LOSARTAN POTASSIUM 25 MG/1
25 TABLET ORAL DAILY
COMMUNITY
Start: 2024-12-14

## 2024-12-20 SDOH — ECONOMIC STABILITY: FOOD INSECURITY: WITHIN THE PAST 12 MONTHS, THE FOOD YOU BOUGHT JUST DIDN'T LAST AND YOU DIDN'T HAVE MONEY TO GET MORE.: NEVER TRUE

## 2024-12-20 SDOH — ECONOMIC STABILITY: INCOME INSECURITY: HOW HARD IS IT FOR YOU TO PAY FOR THE VERY BASICS LIKE FOOD, HOUSING, MEDICAL CARE, AND HEATING?: NOT HARD AT ALL

## 2024-12-20 SDOH — HEALTH STABILITY: PHYSICAL HEALTH: ON AVERAGE, HOW MANY MINUTES DO YOU ENGAGE IN EXERCISE AT THIS LEVEL?: 20 MIN

## 2024-12-20 SDOH — ECONOMIC STABILITY: FOOD INSECURITY: WITHIN THE PAST 12 MONTHS, YOU WORRIED THAT YOUR FOOD WOULD RUN OUT BEFORE YOU GOT MONEY TO BUY MORE.: NEVER TRUE

## 2024-12-20 SDOH — HEALTH STABILITY: PHYSICAL HEALTH: ON AVERAGE, HOW MANY DAYS PER WEEK DO YOU ENGAGE IN MODERATE TO STRENUOUS EXERCISE (LIKE A BRISK WALK)?: 4 DAYS

## 2024-12-20 ASSESSMENT — PATIENT HEALTH QUESTIONNAIRE - PHQ9
SUM OF ALL RESPONSES TO PHQ9 QUESTIONS 1 & 2: 0
1. LITTLE INTEREST OR PLEASURE IN DOING THINGS: NOT AT ALL
2. FEELING DOWN, DEPRESSED OR HOPELESS: NOT AT ALL
SUM OF ALL RESPONSES TO PHQ QUESTIONS 1-9: 0

## 2024-12-20 ASSESSMENT — LIFESTYLE VARIABLES
HOW OFTEN DO YOU HAVE A DRINK CONTAINING ALCOHOL: 2-3 TIMES A WEEK
HOW OFTEN DO YOU HAVE SIX OR MORE DRINKS ON ONE OCCASION: 1
HOW OFTEN DO YOU HAVE A DRINK CONTAINING ALCOHOL: 4
HOW MANY STANDARD DRINKS CONTAINING ALCOHOL DO YOU HAVE ON A TYPICAL DAY: 1
HOW MANY STANDARD DRINKS CONTAINING ALCOHOL DO YOU HAVE ON A TYPICAL DAY: 1 OR 2

## 2024-12-20 NOTE — PROGRESS NOTES
Patient is not abused    Review of Systems   ROS:  As listed in HPI. In addition:  Constitutional:   No headache, fever, fatigue, weight loss or weight gain      Eyes:   No redness, pruritis, pain, visual changes, swelling, or discharge      Ears:    No pain, loss or changes in hearing     Cardiac:    No chest pain      Resp:   No cough or shortness of breath      Neuro   No loss of consciousness, dizziness, seizure    Physical Examination     Evaluation of Cognitive Function:  Mood/affect:  happy  Appearance: age appropriate  Family member/caregiver input:     Physical Exam:  Blood pressure 125/77, pulse 70, temperature 98 °F (36.7 °C), temperature source Temporal, resp. rate 17, height 1.803 m (5' 11\"), weight 107.5 kg (237 lb), SpO2 96%.  GEN: No apparent distress. Alert and oriented and responds to all questions appropriately.   EYES:  Conjunctiva clear; pupils round and reactive to light; extraocular movements are intact.   EAR: External ears are normal.  Tympanic membranes are clear and without effusion.  NOSE: Turbinates are within normal limits.  No drainage  OROPHYARYNX: No oral lesions or exudates.  NECK:  Supple; no masses; thyroid normal           LUNGS: Respirations unlabored; clear to auscultation bilaterally  CARDIOVASCULAR: Regular, rate, and rhythm without murmurs   NEUROLOGIC:  No focal neurologic deficits. Strength and sensation grossly intact.  Coordination and gait grossly intact.   EXT: Well perfused. No edema.  SKIN: No obvious rashes.    Patient Care Team:  None, None as PCP - General  Francisco Javier Aguilar MD as PCP - Empaneled Provider    Advice/Referrals/Counseling   Education and counseling provided:    Flu shot.    Got his shingles shot.    Pneumonia series up-to-date.    Discussed COVID shot recommendations    Robel Soni would be primary decision maker and his brother would be secondary decision-maker.  He does not have an advanced directive on paper.  He is still on the fence about organ

## 2024-12-21 LAB
25(OH)D3 SERPL-MCNC: 35 NG/ML (ref 30–100)
ALBUMIN SERPL-MCNC: 4.2 G/DL (ref 3.5–5)
ALBUMIN/GLOB SERPL: 1.1 (ref 1.1–2.2)
ALP SERPL-CCNC: 105 U/L (ref 45–117)
ALT SERPL-CCNC: 20 U/L (ref 12–78)
ANION GAP SERPL CALC-SCNC: 6 MMOL/L (ref 2–12)
AST SERPL-CCNC: 24 U/L (ref 15–37)
BASOPHILS # BLD: 0 K/UL (ref 0–0.1)
BASOPHILS NFR BLD: 1 % (ref 0–1)
BILIRUB SERPL-MCNC: 0.5 MG/DL (ref 0.2–1)
BUN SERPL-MCNC: 23 MG/DL (ref 6–20)
BUN/CREAT SERPL: 20 (ref 12–20)
CALCIUM SERPL-MCNC: 9.6 MG/DL (ref 8.5–10.1)
CHLORIDE SERPL-SCNC: 106 MMOL/L (ref 97–108)
CHOLEST SERPL-MCNC: 195 MG/DL
CO2 SERPL-SCNC: 26 MMOL/L (ref 21–32)
CREAT SERPL-MCNC: 1.13 MG/DL (ref 0.7–1.3)
DIFFERENTIAL METHOD BLD: ABNORMAL
EOSINOPHIL # BLD: 0.2 K/UL (ref 0–0.4)
EOSINOPHIL NFR BLD: 4 % (ref 0–7)
ERYTHROCYTE [DISTWIDTH] IN BLOOD BY AUTOMATED COUNT: 13.8 % (ref 11.5–14.5)
EST. AVERAGE GLUCOSE BLD GHB EST-MCNC: 134 MG/DL
FOLATE SERPL-MCNC: 24.4 NG/ML (ref 5–21)
GLOBULIN SER CALC-MCNC: 3.8 G/DL (ref 2–4)
GLUCOSE SERPL-MCNC: 100 MG/DL (ref 65–100)
HBA1C MFR BLD: 6.3 % (ref 4–5.6)
HCT VFR BLD AUTO: 39.1 % (ref 36.6–50.3)
HDLC SERPL-MCNC: 33 MG/DL
HDLC SERPL: 5.9 (ref 0–5)
HGB BLD-MCNC: 13.1 G/DL (ref 12.1–17)
IMM GRANULOCYTES # BLD AUTO: 0 K/UL (ref 0–0.04)
IMM GRANULOCYTES NFR BLD AUTO: 1 % (ref 0–0.5)
LDLC SERPL CALC-MCNC: 129 MG/DL (ref 0–100)
LYMPHOCYTES # BLD: 1 K/UL (ref 0.8–3.5)
LYMPHOCYTES NFR BLD: 21 % (ref 12–49)
MCH RBC QN AUTO: 31.6 PG (ref 26–34)
MCHC RBC AUTO-ENTMCNC: 33.5 G/DL (ref 30–36.5)
MCV RBC AUTO: 94.4 FL (ref 80–99)
MONOCYTES # BLD: 0.8 K/UL (ref 0–1)
MONOCYTES NFR BLD: 17 % (ref 5–13)
NEUTS SEG # BLD: 2.6 K/UL (ref 1.8–8)
NEUTS SEG NFR BLD: 56 % (ref 32–75)
NRBC # BLD: 0 K/UL (ref 0–0.01)
NRBC BLD-RTO: 0 PER 100 WBC
PLATELET # BLD AUTO: 214 K/UL (ref 150–400)
PMV BLD AUTO: 11.5 FL (ref 8.9–12.9)
POTASSIUM SERPL-SCNC: 4.6 MMOL/L (ref 3.5–5.1)
PROT SERPL-MCNC: 8 G/DL (ref 6.4–8.2)
RBC # BLD AUTO: 4.14 M/UL (ref 4.1–5.7)
SODIUM SERPL-SCNC: 138 MMOL/L (ref 136–145)
TRIGL SERPL-MCNC: 165 MG/DL
VIT B12 SERPL-MCNC: 417 PG/ML (ref 193–986)
VLDLC SERPL CALC-MCNC: 33 MG/DL
WBC # BLD AUTO: 4.5 K/UL (ref 4.1–11.1)

## 2025-01-14 NOTE — H&P (VIEW-ONLY)
CARE TEAM:  Patient Care Team:  Francisco Javier Aguilar MD as PCP - General (Family Medicine)      HISTORY OF PRESENT ILLNESS  Chief Complaint: Pain of the Right Knee   Age: 84 y.o.    Sex: male   Hand-dominance: left    History of present illness: Mr. Wharton returns today for follow up of his right knee. Since his last visit his knee pain worsened. Pain is all medial. Worse with activity. Injection hasn't helped. He would like to discuss further management options.       OBJECTIVE  Constitutional:  No acute distress. His body mass index is 34.44 kg/m².   Eyes:  Sclera are nonicteric.  Respiratory:  No labored breathing.  Cardiovascular:  No marked edema.  Skin:  No marked skin ulcers.  Neurological:  No marked sensory loss noted.  Psychiatric: Alert and oriented x3.    right KNEE EXAM:  APPEARANCE- No redness, swelling or inflammation.  mild effusion.  ALIGNMENT- mild varus deformity that is correctable  STABILITY-  Negative posterior and anterior drawer sign. Minimal varus/ valgus laxity noted.  ROM-  ROM is 3 to 118 degrees.   STRENGTH/FUNCTION- 5/5 strength with flexion and extension   PERFUSION/SENSATION- Palpable distal pulses, sensation and capillary refill on the lower extremity.   GAIT- antalgic  OTHER-  medial joint line TTP noted. Positive for crepitus.    No pain reproduced with hip ROM, DAE, Stinchfield, or log roll on the affected side.       IMAGING / STUDIES      Multiple views of knee demonstrate severe, bone on bone medial compartment arthritis of the knee. Lateral and patellofemoral compartments well preserved. There is mild varus deformity. No other significant findings are noted.       ASSESSMENT  1. Primary osteoarthritis of right knee    2. Acquired deformity of right knee         Impression: 84 y.o. male with right knee pain with severe medial compartment OA    PLAN  Treatment Plan:   Orders Placed This Encounter   • Surgical Posting Sheet   • CT knee right without contrast (12583)   • Ambulatory

## 2025-01-15 ENCOUNTER — TRANSCRIBE ORDERS (OUTPATIENT)
Facility: HOSPITAL | Age: 85
End: 2025-01-15

## 2025-01-15 ENCOUNTER — CLINICAL DOCUMENTATION (OUTPATIENT)
Age: 85
End: 2025-01-15

## 2025-01-15 DIAGNOSIS — M21.961 ACQUIRED DEFORMITY OF KNEE, RIGHT: Primary | ICD-10-CM

## 2025-01-15 NOTE — PROGRESS NOTES
Pt dropped off 1pg form for stopping meds prior to his knee replacement surgery    Date stamped and handed directly to Dr Aguilar    Copy made and given to pt

## 2025-01-16 ENCOUNTER — CLINICAL DOCUMENTATION (OUTPATIENT)
Age: 85
End: 2025-01-16

## 2025-01-20 ENCOUNTER — HOSPITAL ENCOUNTER (OUTPATIENT)
Facility: HOSPITAL | Age: 85
Discharge: HOME | End: 2025-01-23
Payer: MEDICARE

## 2025-01-20 DIAGNOSIS — M21.961 ACQUIRED DEFORMITY OF KNEE, RIGHT: ICD-10-CM

## 2025-01-20 PROCEDURE — 73700 CT LOWER EXTREMITY W/O DYE: CPT

## 2025-01-21 DIAGNOSIS — I48.20 CHRONIC ATRIAL FIBRILLATION (HCC): ICD-10-CM

## 2025-01-22 ENCOUNTER — NURSE ONLY (OUTPATIENT)
Age: 85
End: 2025-01-22
Payer: MEDICARE

## 2025-01-22 DIAGNOSIS — Z79.01 LONG TERM (CURRENT) USE OF ANTICOAGULANTS: Primary | ICD-10-CM

## 2025-01-22 LAB
POC INR: 2.4
PROTHROMBIN TIME, POC: 28.3

## 2025-01-22 PROCEDURE — 85610 PROTHROMBIN TIME: CPT | Performed by: FAMILY MEDICINE

## 2025-01-22 PROCEDURE — PBSHW AMB POC PT/INR: Performed by: FAMILY MEDICINE

## 2025-01-22 RX ORDER — WARFARIN SODIUM 5 MG/1
TABLET ORAL
Qty: 90 TABLET | Refills: 3 | Status: SHIPPED | OUTPATIENT
Start: 2025-01-22

## 2025-01-22 NOTE — PROGRESS NOTES
The patient identity was confirmed with  and First/Last Name. Medication and dose reviewed with patient, as well as any new diagnosis/procedures.     Edgar Wharton is a 84 y.o. male who presents today for Anticoagulation monitoring.    Indication: atrial fibrillation  INR Goal: 2.0-3.0.  Current dose:  Coumadin 5mg daily, except for 2.5mg Wed/Fri.  Missed Coumadin Doses:  None  Medication Changes:  no  Dietary Changes:  no    Symptoms: taking coumadin appropriately without any bleeding.    Latest INRs:  Lab Results   Component Value Date/Time    INR 2.4 2025 09:05 AM    INR 2.3 2024 08:15 AM    INR 2.1 2024 09:04 AM    INR 2.6 2023 09:21 AM    INR 2.9 2023 09:21 AM    INR 2.8 2023 09:31 AM     *Patient stops warfarin next week due to Surgery next Friday.*     New Coumadin dose:.current treatment plan is effective, no change in therapy.    Next check to be scheduled for  4 weeks.

## 2025-01-23 ENCOUNTER — HOSPITAL ENCOUNTER (OUTPATIENT)
Facility: HOSPITAL | Age: 85
Discharge: HOME | End: 2025-01-26
Payer: MEDICARE

## 2025-01-23 VITALS
HEIGHT: 71 IN | TEMPERATURE: 97.8 F | RESPIRATION RATE: 16 BRPM | BODY MASS INDEX: 33.09 KG/M2 | OXYGEN SATURATION: 96 % | WEIGHT: 236.33 LBS | DIASTOLIC BLOOD PRESSURE: 73 MMHG | HEART RATE: 73 BPM | SYSTOLIC BLOOD PRESSURE: 124 MMHG

## 2025-01-23 LAB
ABO + RH BLD: NORMAL
ALBUMIN SERPL-MCNC: 4 G/DL (ref 3.5–5)
ALBUMIN/GLOB SERPL: 0.9 (ref 1.1–2.2)
ALP SERPL-CCNC: 92 U/L (ref 45–117)
ALT SERPL-CCNC: 21 U/L (ref 12–78)
ANION GAP SERPL CALC-SCNC: 5 MMOL/L (ref 2–12)
APPEARANCE UR: CLEAR
AST SERPL-CCNC: 21 U/L (ref 15–37)
BACTERIA URNS QL MICRO: NEGATIVE /HPF
BILIRUB SERPL-MCNC: 0.4 MG/DL (ref 0.2–1)
BILIRUB UR QL: NEGATIVE
BLOOD GROUP ANTIBODIES SERPL: NORMAL
BUN SERPL-MCNC: 28 MG/DL (ref 6–20)
BUN/CREAT SERPL: 22 (ref 12–20)
CALCIUM SERPL-MCNC: 9.7 MG/DL (ref 8.5–10.1)
CHLORIDE SERPL-SCNC: 108 MMOL/L (ref 97–108)
CO2 SERPL-SCNC: 24 MMOL/L (ref 21–32)
COLOR UR: NORMAL
CREAT SERPL-MCNC: 1.29 MG/DL (ref 0.7–1.3)
EPITH CASTS URNS QL MICRO: NORMAL /LPF
ERYTHROCYTE [DISTWIDTH] IN BLOOD BY AUTOMATED COUNT: 13.4 % (ref 11.5–14.5)
GLOBULIN SER CALC-MCNC: 4.5 G/DL (ref 2–4)
GLUCOSE SERPL-MCNC: 120 MG/DL (ref 65–100)
GLUCOSE UR STRIP.AUTO-MCNC: NEGATIVE MG/DL
HCT VFR BLD AUTO: 39.9 % (ref 36.6–50.3)
HGB BLD-MCNC: 13.6 G/DL (ref 12.1–17)
HGB UR QL STRIP: NEGATIVE
HYALINE CASTS URNS QL MICRO: NORMAL /LPF (ref 0–2)
KETONES UR QL STRIP.AUTO: NEGATIVE MG/DL
LEUKOCYTE ESTERASE UR QL STRIP.AUTO: NEGATIVE
MCH RBC QN AUTO: 31.3 PG (ref 26–34)
MCHC RBC AUTO-ENTMCNC: 34.1 G/DL (ref 30–36.5)
MCV RBC AUTO: 91.9 FL (ref 80–99)
NITRITE UR QL STRIP.AUTO: NEGATIVE
NRBC # BLD: 0 K/UL (ref 0–0.01)
NRBC BLD-RTO: 0 PER 100 WBC
PH UR STRIP: 5 (ref 5–8)
PLATELET # BLD AUTO: 212 K/UL (ref 150–400)
PMV BLD AUTO: 10.7 FL (ref 8.9–12.9)
POTASSIUM SERPL-SCNC: 4.4 MMOL/L (ref 3.5–5.1)
PROT SERPL-MCNC: 8.5 G/DL (ref 6.4–8.2)
PROT UR STRIP-MCNC: NEGATIVE MG/DL
RBC # BLD AUTO: 4.34 M/UL (ref 4.1–5.7)
RBC #/AREA URNS HPF: NORMAL /HPF (ref 0–5)
SODIUM SERPL-SCNC: 137 MMOL/L (ref 136–145)
SP GR UR REFRACTOMETRY: 1.01
SPECIMEN EXP DATE BLD: NORMAL
URINE CULTURE IF INDICATED: NORMAL
UROBILINOGEN UR QL STRIP.AUTO: 0.2 EU/DL (ref 0.2–1)
WBC # BLD AUTO: 5.6 K/UL (ref 4.1–11.1)
WBC URNS QL MICRO: NORMAL /HPF (ref 0–4)

## 2025-01-23 PROCEDURE — 36415 COLL VENOUS BLD VENIPUNCTURE: CPT

## 2025-01-23 PROCEDURE — 86900 BLOOD TYPING SEROLOGIC ABO: CPT

## 2025-01-23 PROCEDURE — 81001 URINALYSIS AUTO W/SCOPE: CPT

## 2025-01-23 PROCEDURE — 86901 BLOOD TYPING SEROLOGIC RH(D): CPT

## 2025-01-23 PROCEDURE — 80053 COMPREHEN METABOLIC PANEL: CPT

## 2025-01-23 PROCEDURE — 97161 PT EVAL LOW COMPLEX 20 MIN: CPT

## 2025-01-23 PROCEDURE — 97530 THERAPEUTIC ACTIVITIES: CPT

## 2025-01-23 PROCEDURE — 86850 RBC ANTIBODY SCREEN: CPT

## 2025-01-23 PROCEDURE — 85027 COMPLETE CBC AUTOMATED: CPT

## 2025-01-23 RX ORDER — FUROSEMIDE 20 MG/1
20 TABLET ORAL DAILY
COMMUNITY
Start: 2024-09-20

## 2025-01-23 RX ORDER — ACETAMINOPHEN 500 MG
1000 TABLET ORAL ONCE
Status: CANCELLED | OUTPATIENT
Start: 2025-01-29

## 2025-01-23 RX ORDER — SODIUM CHLORIDE, SODIUM LACTATE, POTASSIUM CHLORIDE, CALCIUM CHLORIDE 600; 310; 30; 20 MG/100ML; MG/100ML; MG/100ML; MG/100ML
INJECTION, SOLUTION INTRAVENOUS CONTINUOUS
Status: CANCELLED | OUTPATIENT
Start: 2025-01-29

## 2025-01-23 RX ORDER — CELECOXIB 200 MG/1
200 CAPSULE ORAL ONCE
Status: CANCELLED | OUTPATIENT
Start: 2025-01-29

## 2025-01-23 RX ORDER — CEFAZOLIN SODIUM/WATER 2 G/20 ML
2000 SYRINGE (ML) INTRAVENOUS ONCE
Status: CANCELLED | OUTPATIENT
Start: 2025-01-29

## 2025-01-23 RX ORDER — ACETAMINOPHEN 500 MG
1000 TABLET ORAL EVERY 6 HOURS PRN
COMMUNITY

## 2025-01-23 ASSESSMENT — PROMIS GLOBAL HEALTH SCALE
IN GENERAL, HOW WOULD YOU RATE YOUR SATISFACTION WITH YOUR SOCIAL ACTIVITIES AND RELATIONSHIPS [ON A SCALE OF 1 (POOR) TO 5 (EXCELLENT)]?: FAIR
WHO IS THE PERSON COMPLETING THE PROMIS V1.1 SURVEY?: SELF
SUM OF RESPONSES TO QUESTIONS 2, 4, 5, & 10: 12
IN THE PAST 7 DAYS, HOW WOULD YOU RATE YOUR PAIN ON AVERAGE [ON A SCALE FROM 0 (NO PAIN) TO 10 (WORST IMAGINABLE PAIN)]?: 6
IN GENERAL, HOW WOULD YOU RATE YOUR MENTAL HEALTH, INCLUDING YOUR MOOD AND YOUR ABILITY TO THINK [ON A SCALE OF 1 (POOR) TO 5 (EXCELLENT)]?: GOOD
IN GENERAL, WOULD YOU SAY YOUR HEALTH IS...[ON A SCALE OF 1 (POOR) TO 5 (EXCELLENT)]: GOOD
IN GENERAL, WOULD YOU SAY YOUR QUALITY OF LIFE IS...[ON A SCALE OF 1 (POOR) TO 5 (EXCELLENT)]: VERY GOOD
SUM OF RESPONSES TO QUESTIONS 3, 6, 7, & 8: 14
IN GENERAL, HOW WOULD YOU RATE YOUR PHYSICAL HEALTH [ON A SCALE OF 1 (POOR) TO 5 (EXCELLENT)]?: GOOD
TO WHAT EXTENT ARE YOU ABLE TO CARRY OUT YOUR EVERYDAY PHYSICAL ACTIVITIES SUCH AS WALKING, CLIMBING STAIRS, CARRYING GROCERIES, OR MOVING A CHAIR [ON A SCALE OF 1 (NOT AT ALL) TO 5 (COMPLETELY)]?: A LITTLE
IN THE PAST 7 DAYS, HOW WOULD YOU RATE YOUR FATIGUE ON AVERAGE [ON A SCALE FROM 1 (NONE) TO 5 (VERY SEVERE)]?: MODERATE
IN GENERAL, PLEASE RATE HOW WELL YOU CARRY OUT YOUR USUAL SOCIAL ACTIVITIES (INCLUDES ACTIVITIES AT HOME, AT WORK, AND IN YOUR COMMUNITY, AND RESPONSIBILITIES AS A PARENT, CHILD, SPOUSE, EMPLOYEE, FRIEND, ETC) [ON A SCALE OF 1 (POOR) TO 5 (EXCELLENT)]?: GOOD
HOW IS THE PROMIS V1.1 BEING ADMINISTERED?: PAPER
IN THE PAST 7 DAYS, HOW OFTEN HAVE YOU BEEN BOTHERED BY EMOTIONAL PROBLEMS, SUCH AS FEELING ANXIOUS, DEPRESSED, OR IRRITABLE [ON A SCALE FROM 1 (NEVER) TO 5 (ALWAYS)]?: SOMETIMES

## 2025-01-23 ASSESSMENT — KOOS JR
HOW SEVERE IS YOUR KNEE STIFFNESS AFTER FIRST WAKING IN MORNING: MILD
BENDING TO THE FLOOR TO PICK UP OBJECT: MODERATE
STRAIGHTENING KNEE FULLY: MILD
GOING UP OR DOWN STAIRS: SEVERE
KOOS JR TOTAL INTERVAL SCORE: 54.84
RISING FROM SITTING: MILD
STANDING UPRIGHT: MODERATE
TWISING OR PIVOTING ON KNEE: SEVERE

## 2025-01-23 ASSESSMENT — PAIN DESCRIPTION - LOCATION: LOCATION: KNEE

## 2025-01-23 ASSESSMENT — PAIN SCALES - GENERAL: PAINLEVEL_OUTOF10: 1

## 2025-01-23 ASSESSMENT — PAIN DESCRIPTION - DESCRIPTORS: DESCRIPTORS: ACHING

## 2025-01-23 ASSESSMENT — PAIN DESCRIPTION - ORIENTATION: ORIENTATION: RIGHT

## 2025-01-23 NOTE — PROGRESS NOTES
[facility]  Joint/Spine Preoperative Instructions        Surgery Date 1/29/25          Time of Arrival to be called on 1/28 between 2-5pm to [phone]     1. On the day of your surgery, please report to the Surgical Services Registration Desk and sign in at your designated time. The Surgery Center is located to the right of the Emergency Room.     2. You must have someone with you to drive you home. You should not drive a car for 24 hours following surgery. Please make arrangements for a friend or family member to stay with you for the first 24 hours after your surgery.    3. No food after midnight 1/28.  Medications morning of surgery should be taken with a sip of water.  Please follow pre-surgery drink instructions that were given at your Pre Admission Testing appointment.      4. We recommend you do not drink any alcoholic beverages for 24 hours before and after your surgery.    5. Contact your surgeon’s office for instructions on the following medications: non-steroidal anti-inflammatory drugs (i.e. Advil, Aleve), vitamins, and supplements. (Some surgeon’s will want you to stop these medications prior to surgery and others may allow you to take them)  **If you are currently taking Plavix, Coumadin, Aspirin and/or other blood-thinning agents, contact your surgeon for instructions.** Your surgeon will partner with the physician prescribing these medications to determine if it is safe to stop or if you need to continue taking.  Please do not stop taking these medications without instructions from your surgeon    6. Wear comfortable clothes.  Wear glasses instead of contacts.  Do not bring any money or jewelry. Please bring picture ID, insurance card, and any prearranged co-payment or hospital payment.  Do not wear make-up, particularly mascara the morning of your surgery.  Do not wear nail polish, particularly if you are having foot /hand surgery.  Wear your hair loose or down,  no ponytails, buns, priyanka pins or clips.  All body piercings must be removed. Please do not shave for 48 hours prior to surgery. Shaving of the face is acceptable. Please see the attached Soap/Hibiclens bathing instructions.    7. You should understand that if you do not follow these instructions your surgery may be cancelled.  If your physical condition changes (I.e. fever, cold or flu) please contact your surgeon as soon as possible.    8. It is important that you be on time.  If a situation occurs where you may be late, please call [phone] (OR Holding Area).    9. If you have any questions and or problems, please call [phone] (Pre-admission Testing).    10. Your surgery time may be subject to change.  You will receive a phone call the evening prior with your time of arrival.    11.  If having outpatient surgery, you must have someone to drive you here, stay with you during the duration of your stay, and to drive you home at time of discharge.    12. The following link is for the educational video for patients and/or families.    [email or web address]    Special Instructions: per PCP-stop Coumadin 5 days prior to surgery      TAKE ALL MEDICATIONS THE DAY OF SURGERY EXCEPT:no exceptions      I understand a pre-operative phone call will be made to verify my surgery time.  In the event that I am not available, I give permission for a message to be left on my answering service and/or with another person?  yes         ___________________      __________   _________    (Signature of Patient)             (Witness)                (Date and Time)

## 2025-01-23 NOTE — PROGRESS NOTES
Notified Dr. Shayan Serrano's nurse that patient has 2 scrapes/cuts to his right thumb. No redness or swelling. Asked her to let Dr. Downey know.  1/23/25.  Per Felix Serrano the PA for Dr. Downey states it is ok to proceed with surgery.

## 2025-01-23 NOTE — PROGRESS NOTES
Orthopedic and Spine Patients:  Instructions on When You Can   Eat or Drink Before Surgery      You have been provided a pre-surgery drink received at your pre-admission testing appointment.    Night before surgery:  You should drink 1/2 bottle of the  pre-surgery drink at bedtime. No food after midnight!    Day of Surgery:  Complete 2nd half of the bottle of the pre-surgery drink 1 hour prior to arrival at hospital.  For questions call Pre-Admission Testing at [phone].  They are available from 8:00am-5:00pm, Monday through Friday.

## 2025-01-23 NOTE — PROGRESS NOTES
The [facility] \"Your Path to a More Active Life\" orthopedic total knee or total hip educational video and the [facility] Orthopedic [place] patient handbook provided & reviewed during the patients pre-admission testing (PAT) appointment. An opportunity for questions was provided, patient verbalized understanding.

## 2025-01-23 NOTE — PROGRESS NOTES
[facility]  Physical Therapy Pre-surgery evaluation  [address] [place] [address]    PHYSICAL THERAPY PRE TKR SURGERY EVALUATION    Date: 2025  Patient: Edgar Wharton (84 y.o. male)  : 1940  Medical Diagnosis: Encounter for other preprocedural examination [Z01.818]  Procedure(s) (LRB):  RIGHT PARTIAL VERSUS RIGHT TOTAL KNEE REPLACEMENT (Right)     Treatment Diagnosis: M25.561  RIGHT KNEE PAIN    Referral Source: Srini Downey MD  Provider #: Srini Downey   NPI#:  [id number]   Precautions:        ASSESSMENT :  Based on the objective data described below, the patient presents with impaired gait, balance, pain, and overall high level functional mobility due to end stage degenerative joint disease in the right knee.     Discussed anticipated disposition to home with possible discharge within a 0 to 2 day time frame post-surgery. Patient's  was present for the session. Patient in agreement. Patient has been educated on the recommendation for reliable help following surgery and has arranged for at least 24 hours of care after discharge.        The patient indicated he is  interested to discharge day of surgery if all discharge criteria met. Patient voiced good  understanding of therapy specific criteria to discharge day of surgery. Patient with good potential for discharging same day of surgery based on social support and current condition.    Fast Track pathway: [] YES [x] NO   Patient agrees to arrange at least 24 hours of care following surgery: [x] YES [] NO   Patient has outpatient PT appointments scheduled:  [] YES [x] NO       GOALS: (Goals have been discussed and agreed upon with patient.)  DISCHARGE GOALS: Time Frame: 1 DAY  Patient will demonstrate increased strength, range of motion, and pain control via a home exercise program in order to minimize functional deficits in preparation for their upcoming surgery. This will be achieved by using education,  plan of care.  []         Patient understands intent and goals of therapy, but is neutral about his/her participation.  []         Patient is unable to participate in goal setting and plan of care.    Thank you for this referral.  Valeria Sauceda, PT    Time  In / Out       Total Treatment Time     Total Timed Codes     1:1 Treatment Time        [facility] Totals Reminder:  bill using total billable   min of TIMED therapeutic procedures and modalities.   8-22 min = 1 unit; 23-37 min = 2 units; 38-52 min = 3 units;  53-67 min = 4 units; 68-82 min = 5 units     Patient  verified yes     Visit #   Current  / Total 1 1         Physical Therapy Evaluation Charge Determination   History Examination Presentation Decision-Making   MEDIUM  Complexity : 1-2 comorbidities / personal factors will impact the outcome/ POC  MEDIUM Complexity : 3 Standardized tests and measures addressin body structure, function, activity limitation and / or participation in recreation  LOW Complexity : Stable, uncomplicated  AM-PAC  LOW      Based on the above components, the patient evaluation is determined to be of the following complexity level: Low

## 2025-01-23 NOTE — PROGRESS NOTES

## 2025-01-24 LAB
BACTERIA SPEC CULT: ABNORMAL
BACTERIA SPEC CULT: ABNORMAL
SERVICE CMNT-IMP: ABNORMAL

## 2025-01-24 RX ORDER — MUPIROCIN 20 MG/G
OINTMENT TOPICAL 2 TIMES DAILY
COMMUNITY
Start: 2025-01-24

## 2025-01-24 RX ORDER — VANCOMYCIN HYDROCHLORIDE
1500 ONCE
Status: CANCELLED | OUTPATIENT
Start: 2025-01-29

## 2025-01-24 NOTE — PROGRESS NOTES
Called and left message for Dr. Shayan Swanson's nurse with results of MRSA swab being positive for MRSA. Asked for call back with treatment once sent to patient's pharmacy.    Received message back from Kiki that Mupirocin ointment has been electronically sent to patient's pharmacy.    Reviewed pre admission testing (PAT) lab results with patient and provided instructions to start Mupirocin prescription that was sent to patients pharmacy, BID x 5 days in both nostrils; patient verbalized understanding.     Verified with patient that he does not currently take Plavix. There were instructions from cardiology to stop prior to surgery but patient states he does not take Plavix.

## 2025-01-27 NOTE — DISCHARGE INSTRUCTIONS
received these medications, we are giving you the following instructions.    Discharge Instructions:   - Someone should be with you for the next 24 hours  - For your own safety, a responsible adult must drive you home  - Do not consume alcoholic beverages   - Do not make important personal, legal or business decisions for 24 hours  - Move slowly and carefully, do not make sudden position changes. Be alert for dizziness or lightheadedness and move accordingly.  - Do not operate equipment for 24 hours - Lawn mowers, power tools, Kitchen accessories: stove, etc.  - If you have not urinated within 8 hours after discharge, please contact your surgeon's office.

## 2025-01-27 NOTE — PROGRESS NOTES
Patient reports does not have RW for use at home after DOS.  RW ordered through [employer] for DOS 2025.  PROMs/KOOs completed 2025.  KNEE DISABILITY OSTEOARTHRITIS AND OUTCOME SCORE    Stiffness - The following question concerns the amount of joing stiffness you have experienced during the last week in your knee. Stiffness is a sensation of restriction or slowness in the ease with which you move your knee joint.  How severe is your knee stiffness after first wakening in the morning?: 1        Pain - What amount of knee pain have you experienced the last week during the following activities?  Twisting/pivoting on your knee: 3  Straightening knee fully: 1  Going up or down stairs: 3  Standing upright: 2        Function - Please indicate the degree of difficulty you have experienced in the last week due to your knee.  Rising from sittin  Bending to floor/ an object: 2        Raw Score  Jr JASON. Knee Survey Score: 13  KOOS JR Total Interval Score (0-100 Scale): 54.84      PROMIS Questions    In general, would you say your health is:: 3    In general, would you say your quality of life is:: 4    In general, how would you rate your physical health?: 3    In general, how would you rate your mental health, including your mood and your ability to think?: 3    To what extent are you able to carry out your everyday physical activities such as walking, climbing stairs, carrying groceries, or moving a chair?: 2    In the past 7 days how often have you been bothered by emotional problems such as feeling anxious, depressed or irritable?: 3    In the past 7 days how would you rate your fatigue on average?: 3    In the past 7 days how would you rate your pain on average?: 6    In general, please rate how well you carry out your usual social activities and roles. (This includes activities at home, at work and in your community, and responsibilities as a parent, child, spouse, employee, friend, etc.):

## 2025-01-29 ENCOUNTER — ANESTHESIA EVENT (OUTPATIENT)
Facility: HOSPITAL | Age: 85
End: 2025-01-29
Payer: MEDICARE

## 2025-01-29 ENCOUNTER — HOSPITAL ENCOUNTER (OUTPATIENT)
Facility: HOSPITAL | Age: 85
Setting detail: OBSERVATION
Discharge: HOME | End: 2025-01-30
Attending: ORTHOPAEDIC SURGERY | Admitting: ORTHOPAEDIC SURGERY
Payer: MEDICARE

## 2025-01-29 ENCOUNTER — ANESTHESIA (OUTPATIENT)
Facility: HOSPITAL | Age: 85
End: 2025-01-29
Payer: MEDICARE

## 2025-01-29 DIAGNOSIS — M17.11 PRIMARY OSTEOARTHRITIS OF RIGHT KNEE: Primary | ICD-10-CM

## 2025-01-29 LAB
GLUCOSE BLD STRIP.AUTO-MCNC: 115 MG/DL (ref 65–117)
GLUCOSE BLD STRIP.AUTO-MCNC: 117 MG/DL (ref 65–117)
GLUCOSE BLD STRIP.AUTO-MCNC: 159 MG/DL (ref 65–117)
GLUCOSE BLD STRIP.AUTO-MCNC: 165 MG/DL (ref 65–117)
INR PPP: 1.1 (ref 0.9–1.1)
PROTHROMBIN TIME: 11.3 SEC (ref 9.2–11.2)
SERVICE CMNT-IMP: ABNORMAL
SERVICE CMNT-IMP: ABNORMAL
SERVICE CMNT-IMP: NORMAL
SERVICE CMNT-IMP: NORMAL

## 2025-01-29 PROCEDURE — 2500000003 HC RX 250 WO HCPCS

## 2025-01-29 PROCEDURE — 3600000005 HC SURGERY LEVEL 5 BASE: Performed by: ORTHOPAEDIC SURGERY

## 2025-01-29 PROCEDURE — G0378 HOSPITAL OBSERVATION PER HR: HCPCS

## 2025-01-29 PROCEDURE — 6360000002 HC RX W HCPCS: Performed by: ANESTHESIOLOGY

## 2025-01-29 PROCEDURE — 2500000003 HC RX 250 WO HCPCS: Performed by: NURSE ANESTHETIST, CERTIFIED REGISTERED

## 2025-01-29 PROCEDURE — 51798 US URINE CAPACITY MEASURE: CPT

## 2025-01-29 PROCEDURE — 36415 COLL VENOUS BLD VENIPUNCTURE: CPT

## 2025-01-29 PROCEDURE — C1713 ANCHOR/SCREW BN/BN,TIS/BN: HCPCS | Performed by: ORTHOPAEDIC SURGERY

## 2025-01-29 PROCEDURE — 82962 GLUCOSE BLOOD TEST: CPT

## 2025-01-29 PROCEDURE — 6370000000 HC RX 637 (ALT 250 FOR IP): Performed by: ORTHOPAEDIC SURGERY

## 2025-01-29 PROCEDURE — 6360000002 HC RX W HCPCS: Performed by: NURSE ANESTHETIST, CERTIFIED REGISTERED

## 2025-01-29 PROCEDURE — 64447 NJX AA&/STRD FEMORAL NRV IMG: CPT | Performed by: ANESTHESIOLOGY

## 2025-01-29 PROCEDURE — 6370000000 HC RX 637 (ALT 250 FOR IP)

## 2025-01-29 PROCEDURE — 7100000001 HC PACU RECOVERY - ADDTL 15 MIN: Performed by: ORTHOPAEDIC SURGERY

## 2025-01-29 PROCEDURE — 2709999900 HC NON-CHARGEABLE SUPPLY: Performed by: ORTHOPAEDIC SURGERY

## 2025-01-29 PROCEDURE — 2580000003 HC RX 258: Performed by: STUDENT IN AN ORGANIZED HEALTH CARE EDUCATION/TRAINING PROGRAM

## 2025-01-29 PROCEDURE — C1776 JOINT DEVICE (IMPLANTABLE): HCPCS | Performed by: ORTHOPAEDIC SURGERY

## 2025-01-29 PROCEDURE — 2580000003 HC RX 258: Performed by: NURSE ANESTHETIST, CERTIFIED REGISTERED

## 2025-01-29 PROCEDURE — 2500000003 HC RX 250 WO HCPCS: Performed by: ORTHOPAEDIC SURGERY

## 2025-01-29 PROCEDURE — 6360000002 HC RX W HCPCS: Performed by: ORTHOPAEDIC SURGERY

## 2025-01-29 PROCEDURE — 97161 PT EVAL LOW COMPLEX 20 MIN: CPT

## 2025-01-29 PROCEDURE — 7100000000 HC PACU RECOVERY - FIRST 15 MIN: Performed by: ORTHOPAEDIC SURGERY

## 2025-01-29 PROCEDURE — 96372 THER/PROPH/DIAG INJ SC/IM: CPT

## 2025-01-29 PROCEDURE — 3600000015 HC SURGERY LEVEL 5 ADDTL 15MIN: Performed by: ORTHOPAEDIC SURGERY

## 2025-01-29 PROCEDURE — 2580000003 HC RX 258: Performed by: PHYSICIAN ASSISTANT

## 2025-01-29 PROCEDURE — 3700000000 HC ANESTHESIA ATTENDED CARE: Performed by: ORTHOPAEDIC SURGERY

## 2025-01-29 PROCEDURE — 2580000003 HC RX 258

## 2025-01-29 PROCEDURE — 6360000002 HC RX W HCPCS

## 2025-01-29 PROCEDURE — 85610 PROTHROMBIN TIME: CPT

## 2025-01-29 PROCEDURE — 97116 GAIT TRAINING THERAPY: CPT

## 2025-01-29 PROCEDURE — 6370000000 HC RX 637 (ALT 250 FOR IP): Performed by: ANESTHESIOLOGY

## 2025-01-29 PROCEDURE — 3700000001 HC ADD 15 MINUTES (ANESTHESIA): Performed by: ORTHOPAEDIC SURGERY

## 2025-01-29 PROCEDURE — 2720000010 HC SURG SUPPLY STERILE: Performed by: ORTHOPAEDIC SURGERY

## 2025-01-29 DEVICE — MCK ONLAY TIBIA BASEPLATE
Type: IMPLANTABLE DEVICE | Site: KNEE | Status: FUNCTIONAL
Brand: RESTORIS

## 2025-01-29 DEVICE — MAKO X3 UNI ONLAY TIBIAL INSERT SIZE 6 - 8 MM
Type: IMPLANTABLE DEVICE | Site: KNEE | Status: FUNCTIONAL
Brand: MAKO

## 2025-01-29 DEVICE — MCK FEMORAL COMPONENT
Type: IMPLANTABLE DEVICE | Site: KNEE | Status: FUNCTIONAL
Brand: RESTORIS

## 2025-01-29 RX ORDER — SODIUM CHLORIDE 9 MG/ML
INJECTION, SOLUTION INTRAVENOUS CONTINUOUS
Status: DISCONTINUED | OUTPATIENT
Start: 2025-01-29 | End: 2025-01-30 | Stop reason: HOSPADM

## 2025-01-29 RX ORDER — ONDANSETRON 2 MG/ML
INJECTION INTRAMUSCULAR; INTRAVENOUS
Status: DISCONTINUED | OUTPATIENT
Start: 2025-01-29 | End: 2025-01-29 | Stop reason: SDUPTHER

## 2025-01-29 RX ORDER — CITALOPRAM HYDROBROMIDE 20 MG/1
20 TABLET ORAL DAILY
Status: DISCONTINUED | OUTPATIENT
Start: 2025-01-30 | End: 2025-01-30 | Stop reason: HOSPADM

## 2025-01-29 RX ORDER — SODIUM CHLORIDE 0.9 % (FLUSH) 0.9 %
5-40 SYRINGE (ML) INJECTION EVERY 12 HOURS SCHEDULED
Status: DISCONTINUED | OUTPATIENT
Start: 2025-01-29 | End: 2025-01-29 | Stop reason: HOSPADM

## 2025-01-29 RX ORDER — LOSARTAN POTASSIUM 50 MG/1
50 TABLET ORAL DAILY
Status: DISCONTINUED | OUTPATIENT
Start: 2025-01-30 | End: 2025-01-30 | Stop reason: HOSPADM

## 2025-01-29 RX ORDER — FAMOTIDINE 20 MG/1
20 TABLET, FILM COATED ORAL 2 TIMES DAILY
Status: DISCONTINUED | OUTPATIENT
Start: 2025-01-29 | End: 2025-01-30 | Stop reason: HOSPADM

## 2025-01-29 RX ORDER — ACETAMINOPHEN 500 MG
1000 TABLET ORAL EVERY 8 HOURS SCHEDULED
Status: DISCONTINUED | OUTPATIENT
Start: 2025-01-29 | End: 2025-01-30 | Stop reason: HOSPADM

## 2025-01-29 RX ORDER — CELECOXIB 200 MG/1
200 CAPSULE ORAL ONCE
Status: COMPLETED | OUTPATIENT
Start: 2025-01-29 | End: 2025-01-29

## 2025-01-29 RX ORDER — HYDROMORPHONE HYDROCHLORIDE 1 MG/ML
0.5 INJECTION, SOLUTION INTRAMUSCULAR; INTRAVENOUS; SUBCUTANEOUS EVERY 5 MIN PRN
Status: DISCONTINUED | OUTPATIENT
Start: 2025-01-29 | End: 2025-01-29 | Stop reason: HOSPADM

## 2025-01-29 RX ORDER — WARFARIN SODIUM 5 MG/1
5 TABLET ORAL
Status: COMPLETED | OUTPATIENT
Start: 2025-01-29 | End: 2025-01-29

## 2025-01-29 RX ORDER — MEPERIDINE HYDROCHLORIDE 25 MG/ML
12.5 INJECTION INTRAMUSCULAR; INTRAVENOUS; SUBCUTANEOUS EVERY 5 MIN PRN
Status: DISCONTINUED | OUTPATIENT
Start: 2025-01-29 | End: 2025-01-29 | Stop reason: HOSPADM

## 2025-01-29 RX ORDER — PROCHLORPERAZINE EDISYLATE 5 MG/ML
5 INJECTION INTRAMUSCULAR; INTRAVENOUS
Status: COMPLETED | OUTPATIENT
Start: 2025-01-29 | End: 2025-01-29

## 2025-01-29 RX ORDER — SODIUM CHLORIDE 9 MG/ML
INJECTION, SOLUTION INTRAVENOUS ONCE
Status: COMPLETED | OUTPATIENT
Start: 2025-01-29 | End: 2025-01-30

## 2025-01-29 RX ORDER — DEXAMETHASONE SODIUM PHOSPHATE 4 MG/ML
10 INJECTION, SOLUTION INTRA-ARTICULAR; INTRALESIONAL; INTRAMUSCULAR; INTRAVENOUS; SOFT TISSUE ONCE
Status: COMPLETED | OUTPATIENT
Start: 2025-01-29 | End: 2025-01-29

## 2025-01-29 RX ORDER — EPHEDRINE SULFATE 5 MG/ML
INJECTION INTRAVENOUS
Status: DISCONTINUED | OUTPATIENT
Start: 2025-01-29 | End: 2025-01-29 | Stop reason: SDUPTHER

## 2025-01-29 RX ORDER — TAMSULOSIN HYDROCHLORIDE 0.4 MG/1
0.4 CAPSULE ORAL DAILY
Status: DISCONTINUED | OUTPATIENT
Start: 2025-01-29 | End: 2025-01-30 | Stop reason: HOSPADM

## 2025-01-29 RX ORDER — OXYCODONE HYDROCHLORIDE 5 MG/1
5 TABLET ORAL EVERY 4 HOURS PRN
Status: DISCONTINUED | OUTPATIENT
Start: 2025-01-29 | End: 2025-01-30 | Stop reason: HOSPADM

## 2025-01-29 RX ORDER — 0.9 % SODIUM CHLORIDE 0.9 %
INTRAVENOUS SOLUTION INTRAVENOUS
Status: DISCONTINUED | OUTPATIENT
Start: 2025-01-29 | End: 2025-01-29 | Stop reason: SDUPTHER

## 2025-01-29 RX ORDER — IPRATROPIUM BROMIDE AND ALBUTEROL SULFATE 2.5; .5 MG/3ML; MG/3ML
1 SOLUTION RESPIRATORY (INHALATION)
Status: COMPLETED | OUTPATIENT
Start: 2025-01-29 | End: 2025-01-29

## 2025-01-29 RX ORDER — ATENOLOL 25 MG/1
75 TABLET ORAL DAILY
Status: DISCONTINUED | OUTPATIENT
Start: 2025-01-30 | End: 2025-01-30 | Stop reason: HOSPADM

## 2025-01-29 RX ORDER — SODIUM CHLORIDE 0.9 % (FLUSH) 0.9 %
5-40 SYRINGE (ML) INJECTION PRN
Status: DISCONTINUED | OUTPATIENT
Start: 2025-01-29 | End: 2025-01-30 | Stop reason: HOSPADM

## 2025-01-29 RX ORDER — MORPHINE SULFATE 2 MG/ML
2 INJECTION, SOLUTION INTRAMUSCULAR; INTRAVENOUS
Status: DISCONTINUED | OUTPATIENT
Start: 2025-01-29 | End: 2025-01-30 | Stop reason: HOSPADM

## 2025-01-29 RX ORDER — NALOXONE HYDROCHLORIDE 0.4 MG/ML
INJECTION, SOLUTION INTRAMUSCULAR; INTRAVENOUS; SUBCUTANEOUS PRN
Status: DISCONTINUED | OUTPATIENT
Start: 2025-01-29 | End: 2025-01-29 | Stop reason: HOSPADM

## 2025-01-29 RX ORDER — ASPIRIN 81 MG/1
81 TABLET ORAL 2 TIMES DAILY
Status: CANCELLED | OUTPATIENT
Start: 2025-01-29

## 2025-01-29 RX ORDER — WARFARIN SODIUM 5 MG/1
5 TABLET ORAL DAILY
Status: DISCONTINUED | OUTPATIENT
Start: 2025-01-30 | End: 2025-01-29

## 2025-01-29 RX ORDER — ONDANSETRON 4 MG/1
4 TABLET, ORALLY DISINTEGRATING ORAL EVERY 8 HOURS PRN
Status: DISCONTINUED | OUTPATIENT
Start: 2025-01-29 | End: 2025-01-30 | Stop reason: HOSPADM

## 2025-01-29 RX ORDER — TRANEXAMIC ACID 100 MG/ML
INJECTION, SOLUTION INTRAVENOUS PRN
Status: DISCONTINUED | OUTPATIENT
Start: 2025-01-29 | End: 2025-01-29 | Stop reason: ALTCHOICE

## 2025-01-29 RX ORDER — VANCOMYCIN 1.5 G/300ML
1500 INJECTION, SOLUTION INTRAVENOUS ONCE
Status: COMPLETED | OUTPATIENT
Start: 2025-01-29 | End: 2025-01-29

## 2025-01-29 RX ORDER — ONDANSETRON 2 MG/ML
4 INJECTION INTRAMUSCULAR; INTRAVENOUS EVERY 6 HOURS PRN
Status: DISCONTINUED | OUTPATIENT
Start: 2025-01-29 | End: 2025-01-30 | Stop reason: HOSPADM

## 2025-01-29 RX ORDER — FUROSEMIDE 20 MG/1
20 TABLET ORAL DAILY
Status: DISCONTINUED | OUTPATIENT
Start: 2025-01-30 | End: 2025-01-30 | Stop reason: HOSPADM

## 2025-01-29 RX ORDER — LOSARTAN POTASSIUM 25 MG/1
25 TABLET ORAL DAILY
Status: DISCONTINUED | OUTPATIENT
Start: 2025-01-30 | End: 2025-01-30 | Stop reason: HOSPADM

## 2025-01-29 RX ORDER — SENNA AND DOCUSATE SODIUM 50; 8.6 MG/1; MG/1
1 TABLET, FILM COATED ORAL 2 TIMES DAILY
Status: DISCONTINUED | OUTPATIENT
Start: 2025-01-29 | End: 2025-01-30 | Stop reason: HOSPADM

## 2025-01-29 RX ORDER — SODIUM CHLORIDE, SODIUM LACTATE, POTASSIUM CHLORIDE, CALCIUM CHLORIDE 600; 310; 30; 20 MG/100ML; MG/100ML; MG/100ML; MG/100ML
INJECTION, SOLUTION INTRAVENOUS CONTINUOUS
Status: DISCONTINUED | OUTPATIENT
Start: 2025-01-29 | End: 2025-01-29 | Stop reason: HOSPADM

## 2025-01-29 RX ORDER — OXYCODONE HYDROCHLORIDE 5 MG/1
10 TABLET ORAL EVERY 4 HOURS PRN
Status: DISCONTINUED | OUTPATIENT
Start: 2025-01-29 | End: 2025-01-30 | Stop reason: HOSPADM

## 2025-01-29 RX ORDER — POLYETHYLENE GLYCOL 3350 17 G/17G
17 POWDER, FOR SOLUTION ORAL DAILY
Status: DISCONTINUED | OUTPATIENT
Start: 2025-01-29 | End: 2025-01-30 | Stop reason: HOSPADM

## 2025-01-29 RX ORDER — SODIUM CHLORIDE 9 MG/ML
INJECTION, SOLUTION INTRAVENOUS PRN
Status: DISCONTINUED | OUTPATIENT
Start: 2025-01-29 | End: 2025-01-29 | Stop reason: HOSPADM

## 2025-01-29 RX ORDER — ENOXAPARIN SODIUM 100 MG/ML
40 INJECTION SUBCUTANEOUS DAILY
Status: DISCONTINUED | OUTPATIENT
Start: 2025-01-29 | End: 2025-01-30 | Stop reason: HOSPADM

## 2025-01-29 RX ORDER — ROPIVACAINE HYDROCHLORIDE 5 MG/ML
INJECTION, SOLUTION EPIDURAL; INFILTRATION; PERINEURAL
Status: DISCONTINUED | OUTPATIENT
Start: 2025-01-29 | End: 2025-01-29 | Stop reason: SDUPTHER

## 2025-01-29 RX ORDER — DIPHENHYDRAMINE HCL 25 MG
25 CAPSULE ORAL EVERY 6 HOURS PRN
Status: DISCONTINUED | OUTPATIENT
Start: 2025-01-29 | End: 2025-01-30 | Stop reason: HOSPADM

## 2025-01-29 RX ORDER — 0.9 % SODIUM CHLORIDE 0.9 %
500 INTRAVENOUS SOLUTION INTRAVENOUS ONCE AS NEEDED
Status: DISCONTINUED | OUTPATIENT
Start: 2025-01-29 | End: 2025-01-30 | Stop reason: HOSPADM

## 2025-01-29 RX ORDER — SODIUM CHLORIDE 0.9 % (FLUSH) 0.9 %
5-40 SYRINGE (ML) INJECTION EVERY 12 HOURS SCHEDULED
Status: DISCONTINUED | OUTPATIENT
Start: 2025-01-29 | End: 2025-01-30 | Stop reason: HOSPADM

## 2025-01-29 RX ORDER — ROPIVACAINE HYDROCHLORIDE 5 MG/ML
INJECTION, SOLUTION EPIDURAL; INFILTRATION; PERINEURAL PRN
Status: DISCONTINUED | OUTPATIENT
Start: 2025-01-29 | End: 2025-01-29 | Stop reason: ALTCHOICE

## 2025-01-29 RX ORDER — TRANEXAMIC ACID 100 MG/ML
INJECTION, SOLUTION INTRAVENOUS
Status: DISCONTINUED | OUTPATIENT
Start: 2025-01-29 | End: 2025-01-29 | Stop reason: SDUPTHER

## 2025-01-29 RX ORDER — LIDOCAINE HYDROCHLORIDE 20 MG/ML
INJECTION, SOLUTION EPIDURAL; INFILTRATION; INTRACAUDAL; PERINEURAL
Status: DISCONTINUED | OUTPATIENT
Start: 2025-01-29 | End: 2025-01-29 | Stop reason: SDUPTHER

## 2025-01-29 RX ORDER — PHENYLEPHRINE HCL IN 0.9% NACL 0.4MG/10ML
SYRINGE (ML) INTRAVENOUS
Status: DISCONTINUED | OUTPATIENT
Start: 2025-01-29 | End: 2025-01-29 | Stop reason: SDUPTHER

## 2025-01-29 RX ORDER — BISACODYL 10 MG
10 SUPPOSITORY, RECTAL RECTAL DAILY PRN
Status: DISCONTINUED | OUTPATIENT
Start: 2025-01-29 | End: 2025-01-30 | Stop reason: HOSPADM

## 2025-01-29 RX ORDER — FENTANYL CITRATE 50 UG/ML
50 INJECTION, SOLUTION INTRAMUSCULAR; INTRAVENOUS EVERY 5 MIN PRN
Status: DISCONTINUED | OUTPATIENT
Start: 2025-01-29 | End: 2025-01-29 | Stop reason: HOSPADM

## 2025-01-29 RX ORDER — DIPHENHYDRAMINE HYDROCHLORIDE 50 MG/ML
25 INJECTION INTRAMUSCULAR; INTRAVENOUS EVERY 6 HOURS PRN
Status: DISCONTINUED | OUTPATIENT
Start: 2025-01-29 | End: 2025-01-30 | Stop reason: HOSPADM

## 2025-01-29 RX ORDER — SODIUM CHLORIDE 0.9 % (FLUSH) 0.9 %
5-40 SYRINGE (ML) INJECTION PRN
Status: DISCONTINUED | OUTPATIENT
Start: 2025-01-29 | End: 2025-01-29 | Stop reason: HOSPADM

## 2025-01-29 RX ORDER — DEXAMETHASONE SODIUM PHOSPHATE 4 MG/ML
INJECTION, SOLUTION INTRA-ARTICULAR; INTRALESIONAL; INTRAMUSCULAR; INTRAVENOUS; SOFT TISSUE
Status: DISCONTINUED | OUTPATIENT
Start: 2025-01-29 | End: 2025-01-29 | Stop reason: SDUPTHER

## 2025-01-29 RX ORDER — ONDANSETRON 2 MG/ML
4 INJECTION INTRAMUSCULAR; INTRAVENOUS
Status: DISCONTINUED | OUTPATIENT
Start: 2025-01-29 | End: 2025-01-29 | Stop reason: HOSPADM

## 2025-01-29 RX ORDER — ACETAMINOPHEN 500 MG
1000 TABLET ORAL ONCE
Status: COMPLETED | OUTPATIENT
Start: 2025-01-29 | End: 2025-01-29

## 2025-01-29 RX ADMIN — PROCHLORPERAZINE EDISYLATE 2.5 MG: 5 INJECTION INTRAMUSCULAR; INTRAVENOUS at 09:20

## 2025-01-29 RX ADMIN — SENNOSIDES AND DOCUSATE SODIUM 1 TABLET: 50; 8.6 TABLET ORAL at 14:20

## 2025-01-29 RX ADMIN — ACETAMINOPHEN 1000 MG: 500 TABLET, FILM COATED ORAL at 06:50

## 2025-01-29 RX ADMIN — POLYETHYLENE GLYCOL 3350 17 G: 17 POWDER, FOR SOLUTION ORAL at 14:18

## 2025-01-29 RX ADMIN — PROPOFOL 20 MG: 10 INJECTION, EMULSION INTRAVENOUS at 07:47

## 2025-01-29 RX ADMIN — WATER 2000 MG: 1 INJECTION INTRAMUSCULAR; INTRAVENOUS; SUBCUTANEOUS at 14:18

## 2025-01-29 RX ADMIN — PROPOFOL 30 MCG/KG/MIN: 10 INJECTION, EMULSION INTRAVENOUS at 07:45

## 2025-01-29 RX ADMIN — WATER 2000 MG: 1 INJECTION INTRAMUSCULAR; INTRAVENOUS; SUBCUTANEOUS at 07:31

## 2025-01-29 RX ADMIN — Medication 40 MCG: at 08:44

## 2025-01-29 RX ADMIN — ENOXAPARIN SODIUM 40 MG: 100 INJECTION SUBCUTANEOUS at 14:18

## 2025-01-29 RX ADMIN — ROPIVACAINE HYDROCHLORIDE 20 ML: 5 INJECTION, SOLUTION EPIDURAL; INFILTRATION; PERINEURAL at 07:23

## 2025-01-29 RX ADMIN — MEPIVACAINE HYDROCHLORIDE 2.6 ML: 20 INJECTION, SOLUTION EPIDURAL; INFILTRATION at 07:22

## 2025-01-29 RX ADMIN — WARFARIN SODIUM 5 MG: 5 TABLET ORAL at 18:33

## 2025-01-29 RX ADMIN — ACETAMINOPHEN 1000 MG: 500 TABLET, FILM COATED ORAL at 14:19

## 2025-01-29 RX ADMIN — CELECOXIB 200 MG: 200 CAPSULE ORAL at 06:50

## 2025-01-29 RX ADMIN — DEXAMETHASONE SODIUM PHOSPHATE 8 MG: 4 INJECTION INTRA-ARTICULAR; INTRALESIONAL; INTRAMUSCULAR; INTRAVENOUS; SOFT TISSUE at 07:35

## 2025-01-29 RX ADMIN — SODIUM CHLORIDE: 9 INJECTION, SOLUTION INTRAVENOUS at 14:18

## 2025-01-29 RX ADMIN — VANCOMYCIN 1500 MG: 1.5 INJECTION, SOLUTION INTRAVENOUS at 07:12

## 2025-01-29 RX ADMIN — FAMOTIDINE 20 MG: 20 TABLET, FILM COATED ORAL at 21:57

## 2025-01-29 RX ADMIN — SODIUM CHLORIDE 25 ML/HR: 9 INJECTION, SOLUTION INTRAVENOUS at 08:44

## 2025-01-29 RX ADMIN — SODIUM CHLORIDE, POTASSIUM CHLORIDE, SODIUM LACTATE AND CALCIUM CHLORIDE: 600; 310; 30; 20 INJECTION, SOLUTION INTRAVENOUS at 07:11

## 2025-01-29 RX ADMIN — EPHEDRINE SULFATE 5 MG: 5 INJECTION INTRAVENOUS at 07:47

## 2025-01-29 RX ADMIN — OXYCODONE HYDROCHLORIDE 5 MG: 5 TABLET ORAL at 09:20

## 2025-01-29 RX ADMIN — TAMSULOSIN HYDROCHLORIDE 0.4 MG: 0.4 CAPSULE ORAL at 14:20

## 2025-01-29 RX ADMIN — PROPOFOL 20 MG: 10 INJECTION, EMULSION INTRAVENOUS at 07:19

## 2025-01-29 RX ADMIN — IPRATROPIUM BROMIDE AND ALBUTEROL SULFATE 1 DOSE: .5; 3 SOLUTION RESPIRATORY (INHALATION) at 10:30

## 2025-01-29 RX ADMIN — Medication 80 MCG: at 07:34

## 2025-01-29 RX ADMIN — EPHEDRINE SULFATE 5 MG: 5 INJECTION INTRAVENOUS at 08:41

## 2025-01-29 RX ADMIN — SENNOSIDES AND DOCUSATE SODIUM 1 TABLET: 50; 8.6 TABLET ORAL at 21:57

## 2025-01-29 RX ADMIN — EPHEDRINE SULFATE 5 MG: 5 INJECTION INTRAVENOUS at 07:39

## 2025-01-29 RX ADMIN — DEXAMETHASONE SODIUM PHOSPHATE 10 MG: 4 INJECTION INTRA-ARTICULAR; INTRALESIONAL; INTRAMUSCULAR; INTRAVENOUS; SOFT TISSUE at 14:19

## 2025-01-29 RX ADMIN — TRANEXAMIC ACID 1000 MG: 100 INJECTION, SOLUTION INTRAVENOUS at 07:31

## 2025-01-29 RX ADMIN — WATER 2000 MG: 1 INJECTION INTRAMUSCULAR; INTRAVENOUS; SUBCUTANEOUS at 21:56

## 2025-01-29 RX ADMIN — SODIUM CHLORIDE: 9 INJECTION, SOLUTION INTRAVENOUS at 10:18

## 2025-01-29 RX ADMIN — PHENYLEPHRINE HYDROCHLORIDE 20 MCG/MIN: 10 INJECTION INTRAVENOUS at 07:36

## 2025-01-29 RX ADMIN — Medication 40 MCG: at 08:41

## 2025-01-29 RX ADMIN — ONDANSETRON 4 MG: 2 INJECTION INTRAMUSCULAR; INTRAVENOUS at 08:37

## 2025-01-29 RX ADMIN — Medication 40 MCG: at 08:36

## 2025-01-29 RX ADMIN — LIDOCAINE HYDROCHLORIDE 20 MG: 20 INJECTION, SOLUTION EPIDURAL; INFILTRATION; INTRACAUDAL; PERINEURAL at 07:35

## 2025-01-29 RX ADMIN — HYDROMORPHONE HYDROCHLORIDE 0.25 MG: 1 INJECTION, SOLUTION INTRAMUSCULAR; INTRAVENOUS; SUBCUTANEOUS at 08:25

## 2025-01-29 RX ADMIN — Medication 80 MCG: at 07:37

## 2025-01-29 RX ADMIN — FAMOTIDINE 20 MG: 20 TABLET, FILM COATED ORAL at 14:19

## 2025-01-29 ASSESSMENT — PAIN - FUNCTIONAL ASSESSMENT: PAIN_FUNCTIONAL_ASSESSMENT: 0-10

## 2025-01-29 ASSESSMENT — PAIN DESCRIPTION - DESCRIPTORS: DESCRIPTORS: ACHING

## 2025-01-29 ASSESSMENT — ENCOUNTER SYMPTOMS: SHORTNESS OF BREATH: 1

## 2025-01-29 NOTE — PERIOP NOTE
06:27= ambulated independently to Pre oP; A&Ox4, consents verified (3); changed into gown with NO CHG. States he adhered to PAT guidelines for cleansing and pre-procedure drinks.    06:44= voided in BR; no mepilex applied d/t spinal; declined warming blanket.     06:52= Dr. Gonzalez in to discuss Anesthesia.    06:56= Dr. Downey in to discuss surgery.    07:17= INR 1.1; informed Dr. Gonzalez.    07:19= timeout performed with Dr. Gonzalez for spinal; 2LO2NC placed on pt and frequent VS started.    07:23= timeout performed with Dr. Gonzalez for right knee nerve block; 2LO2NC remains on pt and frequent VS cycling.     07:30= set brother up to receive text messaging alerts.

## 2025-01-29 NOTE — ANESTHESIA POST-OP
TRANSFER - OUT REPORT:    Verbal report given to Nacho Bernabe RN (name) on Edgar Wharton  being transferred to [facility](unit) for routine post-op       Report consisted of patient’s Situation, Background, Assessment and   Recommendations(SBAR).     Information from the following report(s) Surgery Report, Intake/Output, MAR, and Recent Results was reviewed with the receiving nurse.    Opportunity for questions and clarification was provided.      Patient transported with:   O2 @ 3 liters  Tech

## 2025-01-29 NOTE — OP NOTE
OPERATIVE REPORT    FACILITY: [facility]    PATIENT NAME: Edgar Wharton     DATE OF OPERATION: 1/29/25    PREOPERATIVE DIAGNOSIS: right knee end stage osteoarthritis    POSTOPERATIVE DIAGNOSIS: same    SURGERIES PERFORMED:   right medial unicompartmental knee arthroplasty    ATTENDING PHYSICIAN: Srini Downey MD    ASSISTANT: Felix Collazo PA-C (Performing all or most of the following assistant-at-surgery services including but not limited to: proper patient positioning, sterile/prep and draping, placement of instruments/trackers, operative exposure, minor portions of bone / soft tissue excision, final irrigation and debridement, deep and superficial closure, application of final dressings)    IMPLANTS:    Lakota Restoris partial knee size 5 femur, 6 tibia, 8 poly    SPECIMENS:  none    OPERATIVE FINDINGS: End stage medial compartment osteoarthritis. Stable partial knee at conclusion.    ANESTHESIA: spinal plus regional    FLUIDS: Please see anesthesia record    ESTIMATED BLOOD LOSS: 10     TOURNIQUET TIME: 49 min    INDICATIONS FOR PROCEDURE: Edgar Wharton is a 84 y.o. male who presented to clinic with right knee pain. Radiographs demonstrated advanced arthritic changes of the affected knee. They were counseled on the risks, benefits, and alternatives to surgery. Risks were outlined to include, but were not limited to: bleeding, infection, fracture, damage to blood vessels or nerves, hardware failure, loosening, continued pain, stiffness, leg length inequality, and medical risks including heart attack, stroke, blood clot, and even death. The patient elected to continue with the operation, and gave informed consent to do so.    DETAILED DESCRIPTION OF PROCEDURE:   The patient was identified in the preoperative holding area. The operative site was marked. The nature of procedure was reviewed and informed consent was confirmed. The patient was evaluated by anesthesia and a regional block was completed.  The patient

## 2025-01-29 NOTE — ANESTHESIA POSTPROCEDURE EVALUATION
Department of Anesthesiology  Postprocedure Note    Patient: Edgar Wharton  MRN: [id number]  YOB: 1940  Date of evaluation: 1/29/2025    Procedure Summary       Date: 01/29/25 Room / Location: [facility] MAIN OR  / [facility] MAIN OR    Anesthesia Start: 0731 Anesthesia Stop: 0900    Procedure: RIGHT PARTIAL KNEE REPLACEMENT (Right: Knee) Diagnosis:       Primary osteoarthritis of right knee      (Primary osteoarthritis of right knee [M17.11])    Providers: Srini Downey MD Responsible Provider: Rajinder Gonzalez MD    Anesthesia Type: MAC, Spinal, Regional ASA Status: 3            Anesthesia Type: MAC, Spinal, Regional    Rina Phase I: Rina Score: 10    Rina Phase II:      Anesthesia Post Evaluation    Patient location during evaluation: PACU  Patient participation: complete - patient participated  Level of consciousness: sleepy but conscious and responsive to verbal stimuli  Airway patency: patent  Nausea & Vomiting: no vomiting and no nausea  Cardiovascular status: blood pressure returned to baseline and hemodynamically stable  Respiratory status: acceptable  Hydration status: stable    No notable events documented.

## 2025-01-29 NOTE — PROGRESS NOTES
Patient given RW from consignment for home use.      Discussed the importance of using pain medication and other methods for pain control such as ice/rest/elevate, pre-medicating prior to physical therapy.  Discussed the importance of being safe at hospital and home by using RW until cleared by PT, wearing safe shoes, preparing home for after surgery and having a  to help at home.  Discussed the importance of home PT, daily exercises as instructed by physical therapist and post-op appointment with surgeon and the need for transportation to this appointment until the surgeon has cleared you for driving.    Discussed risk after surgery and the importance of preventing infection by good hand hygiene, using clean towels and wash cloths at each shower, inspect wound/dressing daily, moving every two hours while awake, using the incentive spirometer every hour while awake.  When to call the doctor for help, or when to call 911 for emergency.  Opportunity given for patient/family to ask additional questions about any special concerns regarding your care while on the Ortho unit and preparing for discharge.     Electrodesiccation And Curettage Text: The wound bed was treated with electrodesiccation and curettage after the biopsy was performed. Anesthesia Volume In Cc (Will Not Render If 0): 0.4 Type Of Destruction Used: Curettage Bill For Surgical Tray: no Consent: Verbal consent was obtained and risks were reviewed including but not limited to scarring, infection, bleeding, scabbing, incomplete removal, nerve damage and allergy to anesthesia. Detail Level: Detailed Biopsy Method: double edge Personna blade Silver Nitrate Text: The wound bed was treated with silver nitrate after the biopsy was performed. Was A Bandage Applied: Yes Hemostasis: Drysol and Electrocautery Dressing: bandage Cryotherapy Text: The wound bed was treated with cryotherapy after the biopsy was performed. Size Of Lesion In Cm: 1.8 Anesthesia Type: 2% lidocaine with epinephrine Billing Type: Third-Party Bill Lab: [id number] Curettage Text: The wound bed was treated with curettage after the biopsy was performed. X Size Of Lesion In Cm: 1 Electrodesiccation Text: The wound bed was treated with electrodesiccation after the biopsy was performed. Depth Of Biopsy: dermis Notification Instructions: Patient will be notified of biopsy results. However, patient instructed to call the office if not contacted within 2 weeks. Biopsy Type: H and E Lab Facility: [id number] Additional Anesthesia Volume In Cc (Will Not Render If 0): 0 Post-Care Instructions: I reviewed with the patient in detail post-care instructions. Patient is to keep the biopsy site dry overnight, and then apply bacitracin twice daily until healed. Patient may apply hydrogen peroxide soaks to remove any crusting. Wound Care: Bacitracin

## 2025-01-29 NOTE — PROGRESS NOTES
Ortho / Neurosurgery Progress Note    POD# 0  s/p RIGHT PARTIAL KNEE REPLACEMENT   Pt seen in PACU with no visitors, denies pain currently, not voiding, tolerating PO. Pt with hx of COPD, saturating in high 80s. Pt is PIERCE. Plan to discharge today. Not voided yet.     Patient in bed    VSS Afebrile.    Visit Vitals  /60   Pulse 69   Temp 97.4 °F (36.3 °C) (Oral)   Resp 10   Ht 1.791 m (5' 10.5\")   Wt 108 kg (238 lb 1.6 oz)   SpO2 97%   BMI 33.68 kg/m²       Voiding status: pending           Labs    Lab Results   Component Value Date/Time    HGB 13.6 01/23/2025 09:11 AM      Lab Results   Component Value Date/Time    INR 2.4 01/22/2025 09:05 AM    INR 2.6 04/26/2023 09:21 AM      Lab Results   Component Value Date/Time     01/23/2025 09:11 AM    K 4.4 01/23/2025 09:11 AM     01/23/2025 09:11 AM    CO2 24 01/23/2025 09:11 AM    BUN 28 01/23/2025 09:11 AM     Recent Glucose Results:   Glucose   Date Value Ref Range Status   01/23/2025 120 (H) 65 - 100 mg/dL Final   12/20/2024 100 65 - 100 mg/dL Final   08/25/2023 120 (H) 65 - 100 mg/dL Final           Body mass index is 33.68 kg/m². : A BMI > 30 is classified as obesity and > 40 is classified as morbid obesity.     Awake and alert. No acute distress.    Dressing: Silver Dressing and Ace Wrap C.D.I.   No significant erythema or swelling  Cryotherapy in place over incision.   BLE sensation to light touch intact  BLE motor intact. Strength 5/5    SCD for mechanical DVT proph while in bed        PLAN:  1) PT BID - WBAT.   2) DVT Prophylaxis: Coumadin    3) GI Prophylaxis - pepcid  4) Pain control - scheduled tylenol  and toradol, and prn  oxycodone    5) Readiness for discharge:     [] Vital Signs stable    [x] Labs stable    [] + Voiding    [x] Wound intact, drainage minimal    [x] Tolerating PO intake     [] Cleared by PT (OT if applicable) for discharge   [x] Adequate pain control on oral medication alone   - Pt with soft BP, start IVF at 75ml//hr for

## 2025-01-29 NOTE — INTERVAL H&P NOTE
Update History & Physical    The patient's History and Physical of January 14, 2025 was reviewed with the patient and I examined the patient. There was no change. The surgical site was confirmed by the patient and me.     Plan: The risks, benefits, expected outcome, and alternative to the recommended procedure have been discussed with the patient. Patient understands and wants to proceed with the procedure.     Electronically signed by Felix Collazo PA-C on 1/29/2025 at 7:01 AM

## 2025-01-29 NOTE — FLOWSHEET NOTE
01/29/25 1242   Handoff   Communication Given Periop Handoff/Relief   Handoff phase Phase I relief   Handoff Given To Francisco Javier Aranda RN   Handoff Received From Duyen Miranda RN   Handoff Communication Face to Face;At bedside   Time Handoff Given 1882

## 2025-01-29 NOTE — FLOWSHEET NOTE
01/29/25 0900   Handoff   Communication Given Periop Handoff/Relief   Handoff phase Phase I receiving   Handoff Given To Duyen PACU RN   Handoff Received From Julius OR BHARATHI/ JUAN KASPER/Hesham RUBIO   Handoff Communication Face to Face;At bedside   Time Handoff Given 0900 01/29/25 0935   Family/Significant Other Communication   Reason Update   Name Leo   Relationship Sibling   Method of Communication Phone        01/29/25 1000   Incentive Spirometry Tx   Treatment Effort Initial;Assisted by nursing;Instructed;Well   Achieved Volume (mL) 3000 mL       1125: Patient eating lunch, tolerating po well    1155: Update given to Leo - brother       01/29/25 1230   Output (mL)   Urine 150 mL   Urine Assessment   Urinary Status Voiding   Urine Color Yellow/straw   Urine Appearance Clear   Urine Odor No odor   Bladder Scan Volume (mL) 790 mL   $ Bladder scan $ Yes        01/29/25 1243   Handoff   Communication Given Periop Handoff/Relief   Handoff phase Phase I relief   Handoff Given To Francisco Javier   Handoff Received From Duyen   Handoff Communication Face to Face;At bedside   Time Handoff Given 1243

## 2025-01-29 NOTE — ANESTHESIA PRE PROCEDURE
Department of Anesthesiology  Preprocedure Note       Name:  Edgar Wharton   Age:  84 y.o.  :  1940                                          MRN:  [id number]         Date:  2025      Surgeon: Surgeon(s):  Srini Downey MD    Procedure: Procedure(s):  RIGHT PARTIAL VERSUS RIGHT TOTAL KNEE REPLACEMENT    Medications prior to admission:   Prior to Admission medications    Medication Sig Start Date End Date Taking? Authorizing Provider   mupirocin (BACTROBAN) 2 % ointment Apply topically in the morning and at bedtime Take twice a day for 5 days-apply pea sized amount on q-tip and apply to both nostrils 25   Eber Pruitt MD   furosemide (LASIX) 20 MG tablet Take 1 tablet by mouth daily 24   Eber Pruitt MD   acetaminophen (TYLENOL) 500 MG tablet Take 2 tablets by mouth every 6 hours as needed for Pain    Eber Pruitt MD   warfarin (COUMADIN) 5 MG tablet TAKE 1 TABLET(5 MG) BY MOUTH DAILY. EXCEPT 2.5 MG HALF TABLET THURS/ 25   Francisco Javier Aguilar MD   losartan (COZAAR) 25 MG tablet Take 1 tablet by mouth daily 24   Eber Pruitt MD   potassium chloride (K-TAB) 20 MEQ TBCR extended release tablet Take 1 tablet by mouth daily    Eber Pruitt MD   losartan (COZAAR) 50 MG tablet TAKE 1 TABLET BY MOUTH DAILY 24   Francisco Javier Aguilar MD   atenolol (TENORMIN) 50 MG tablet Take 1 tablet by mouth daily  Patient taking differently: Take 1.5 tablets by mouth daily 24   Francisco Javier Aguilar MD   pantoprazole (PROTONIX) 40 MG tablet TAKE 1 TABLET BY MOUTH EVERY EVENING 12/3/24   Francisco Javier Aguilar MD   lovastatin (MEVACOR) 40 MG tablet TAKE 1 TABLET BY MOUTH EVERY NIGHT 24   Francisco Javier Aguilar MD   citalopram (CELEXA) 20 MG tablet TAKE 1 TABLET BY MOUTH DAILY 24   Chance Mclean MD   gemfibrozil (LOPID) 600 MG tablet TAKE 1 TABLET BY MOUTH TWICE DAILY 24   Francisco Javier Aguilar MD   tamsulosin (FLOMAX) 0.4 MG capsule TAKE 1 CAPSULE BY MOUTH

## 2025-01-29 NOTE — PROGRESS NOTES
Pharmacist Daily Dosing of Warfarin    Indication & Goal INR: AFib, INR Goal 2-3    PTA Warfarin Dose: 5mg daily, except for 2.5mg Wed/Fri    Notable concurrent conditions and medications: Acetaminophen    Labs:  Recent Labs     Units 01/29/25  1650   INR   1.1       Impression/Plan:   INR subtherapeutic  Ordered warfarin 5mg x 1 dose today  Daily INR has been ordered  CBC w/o differential every other day has been ordered     Pharmacy will follow daily and adjust the dose as appropriate.    Thank you,  ROE MENDEZ [facility]

## 2025-01-30 VITALS
HEART RATE: 77 BPM | SYSTOLIC BLOOD PRESSURE: 127 MMHG | DIASTOLIC BLOOD PRESSURE: 80 MMHG | BODY MASS INDEX: 33.33 KG/M2 | TEMPERATURE: 97.5 F | WEIGHT: 238.1 LBS | RESPIRATION RATE: 16 BRPM | HEIGHT: 71 IN | OXYGEN SATURATION: 93 %

## 2025-01-30 LAB
ANION GAP SERPL CALC-SCNC: 5 MMOL/L (ref 2–12)
BUN SERPL-MCNC: 23 MG/DL (ref 6–20)
BUN/CREAT SERPL: 21 (ref 12–20)
CALCIUM SERPL-MCNC: 9.1 MG/DL (ref 8.5–10.1)
CHLORIDE SERPL-SCNC: 105 MMOL/L (ref 97–108)
CO2 SERPL-SCNC: 26 MMOL/L (ref 21–32)
CREAT SERPL-MCNC: 1.12 MG/DL (ref 0.7–1.3)
GLUCOSE SERPL-MCNC: 120 MG/DL (ref 65–100)
HCT VFR BLD AUTO: 36.8 % (ref 36.6–50.3)
HGB BLD-MCNC: 12.1 G/DL (ref 12.1–17)
INR PPP: 1.1 (ref 0.9–1.1)
POTASSIUM SERPL-SCNC: 4.1 MMOL/L (ref 3.5–5.1)
PROTHROMBIN TIME: 11.4 SEC (ref 9.2–11.2)
SODIUM SERPL-SCNC: 136 MMOL/L (ref 136–145)

## 2025-01-30 PROCEDURE — G0378 HOSPITAL OBSERVATION PER HR: HCPCS

## 2025-01-30 PROCEDURE — 2500000003 HC RX 250 WO HCPCS

## 2025-01-30 PROCEDURE — 96372 THER/PROPH/DIAG INJ SC/IM: CPT

## 2025-01-30 PROCEDURE — 6360000002 HC RX W HCPCS

## 2025-01-30 PROCEDURE — 85018 HEMOGLOBIN: CPT

## 2025-01-30 PROCEDURE — 80048 BASIC METABOLIC PNL TOTAL CA: CPT

## 2025-01-30 PROCEDURE — 6370000000 HC RX 637 (ALT 250 FOR IP)

## 2025-01-30 PROCEDURE — 36415 COLL VENOUS BLD VENIPUNCTURE: CPT

## 2025-01-30 PROCEDURE — 85610 PROTHROMBIN TIME: CPT

## 2025-01-30 PROCEDURE — 97116 GAIT TRAINING THERAPY: CPT

## 2025-01-30 PROCEDURE — 85014 HEMATOCRIT: CPT

## 2025-01-30 RX ORDER — SENNA AND DOCUSATE SODIUM 50; 8.6 MG/1; MG/1
1 TABLET, FILM COATED ORAL 2 TIMES DAILY
Qty: 11 TABLET | Refills: 0 | Status: SHIPPED | OUTPATIENT
Start: 2025-01-30 | End: 2025-02-05

## 2025-01-30 RX ORDER — OXYCODONE HYDROCHLORIDE 5 MG/1
5 TABLET ORAL EVERY 4 HOURS PRN
Qty: 28 TABLET | Refills: 0 | Status: SHIPPED | OUTPATIENT
Start: 2025-01-30 | End: 2025-02-06

## 2025-01-30 RX ORDER — FAMOTIDINE 20 MG/1
20 TABLET, FILM COATED ORAL 2 TIMES DAILY
Qty: 60 TABLET | Refills: 3 | Status: SHIPPED | OUTPATIENT
Start: 2025-01-30

## 2025-01-30 RX ADMIN — ATENOLOL 75 MG: 25 TABLET ORAL at 09:04

## 2025-01-30 RX ADMIN — SENNOSIDES AND DOCUSATE SODIUM 1 TABLET: 50; 8.6 TABLET ORAL at 09:04

## 2025-01-30 RX ADMIN — FAMOTIDINE 20 MG: 20 TABLET, FILM COATED ORAL at 09:04

## 2025-01-30 RX ADMIN — SODIUM CHLORIDE, PRESERVATIVE FREE 10 ML: 5 INJECTION INTRAVENOUS at 09:04

## 2025-01-30 RX ADMIN — ACETAMINOPHEN 1000 MG: 500 TABLET, FILM COATED ORAL at 05:23

## 2025-01-30 RX ADMIN — ENOXAPARIN SODIUM 40 MG: 100 INJECTION SUBCUTANEOUS at 09:03

## 2025-01-30 RX ADMIN — OXYCODONE HYDROCHLORIDE 5 MG: 5 TABLET ORAL at 09:03

## 2025-01-30 RX ADMIN — FUROSEMIDE 20 MG: 20 TABLET ORAL at 09:04

## 2025-01-30 RX ADMIN — TAMSULOSIN HYDROCHLORIDE 0.4 MG: 0.4 CAPSULE ORAL at 09:03

## 2025-01-30 RX ADMIN — POLYETHYLENE GLYCOL 3350 17 G: 17 POWDER, FOR SOLUTION ORAL at 09:03

## 2025-01-30 RX ADMIN — CITALOPRAM HYDROBROMIDE 20 MG: 20 TABLET ORAL at 09:05

## 2025-01-30 ASSESSMENT — PAIN DESCRIPTION - LOCATION: LOCATION: KNEE

## 2025-01-30 ASSESSMENT — PAIN DESCRIPTION - PAIN TYPE: TYPE: SURGICAL PAIN

## 2025-01-30 ASSESSMENT — PAIN SCALES - GENERAL
PAINLEVEL_OUTOF10: 0
PAINLEVEL_OUTOF10: 6

## 2025-01-30 ASSESSMENT — PAIN DESCRIPTION - ORIENTATION: ORIENTATION: RIGHT

## 2025-01-30 NOTE — CARE COORDINATION
1134 - [facility] only accepting agency at this time; other agencies unable to serve pt's zip code. SOC reported for 2/4/25 but will see sooner if schedule can be adjusted. Will proceed with this agency to ensure pt receives home health services. AVS updated.    Initial note - CM completed chart review; pt presented for pre-planned surgery. Anticipate pt will require home health PT visits, pt's residence in [place] may be barrier to agency choice/ availability, CM sending multiple referrals to determine zip code coverage and service availability. Per chart review, [facility] CM following for additions or changes to discharge plan.       Transportation for d/c: family   Support post d/c: family   Does pt own DME needed for d/c: RW ordered preop   Accepting  agency: [facility]  Care Management  [phone]

## 2025-01-30 NOTE — PROGRESS NOTES
End of Shift Note    Bedside shift change report given to Nacho RAM RN (oncoming nurse) by Anahi Bee RN (offgoing nurse).  Report included the following information SBAR, Kardex, and MAR    Shift worked:  night     Shift summary and any significant changes:      Pt voiding ,pain well controlled with tylenol    Concerns for physician to address:  none     Zone phone for oncoming shift:          Activity:  Level of Assistance: Minimal assist, patient does 75% or more  Number times ambulated in hallways past shift: 0  Number of times OOB to chair past shift: 0    Cardiac:   Cardiac Monitoring: NO      Cardiac Rhythm: Atrial fib, Ventricular paced    Access:  Current line(s): PIV     Genitourinary:   Urinary Status: Voiding, Bathroom privileges    Respiratory:   O2 Device: None (Room air)  Chronic home O2 use?: NO  Incentive spirometer at bedside: YES    GI:  Last BM (including prior to admit): 01/28/25  Current diet:  ADULT DIET; Regular  Passing flatus: YES    Pain Management:   Patient states pain is manageable on current regimen: YES    Skin:  Carlyle Scale Score: 21  Interventions: Wound Offloading (Prevention Methods): Bed, pressure reduction mattress, Pillows, Repositioning, Elevate heels    Patient Safety:  Fall Risk: Nursing Judgement-Fall Risk High(Add Comments): Yes  Fall Risk Interventions  Nursing Judgement-Fall Risk High(Add Comments): Yes  Toilet Every 2 Hours-In Advance of Need: Yes  Hourly Visual Checks: In bed, Eyes closed  Fall Visual Posted: Armband, Fall sign posted, Socks  Room Door Open: Deferred to promote rest  Alarm On: Bed  Patient Moved Closer to Nursing Station: No    Active Consults:   IP CONSULT TO CASE MANAGEMENT  IP CONSULT TO PHARMACY    Length of Stay:  Expected LOS: 1  Actual LOS: 0    Anahi Bee RN

## 2025-01-30 NOTE — PLAN OF CARE
Problem: Physical Therapy - Adult  Goal: By Discharge: Performs mobility at highest level of function for planned discharge setting.  See evaluation for individualized goals.  Description: FUNCTIONAL STATUS PRIOR TO ADMISSION: Patient was independent and active without use of DME.    HOME SUPPORT PRIOR TO ADMISSION: The patient lived with grandson but did not require assistance.    Physical Therapy Goals  Initiated 1/29/2025  1.  Patient will move from supine to sit and sit to supine, scoot up and down, and roll side to side in bed with modified independence within 4 day(s).    2.  Patient will perform sit to stand with independence within 4 day(s).  3.  Patient will transfer from bed to chair and chair to bed with modified independence using the least restrictive device within 4 day(s).  4.  Patient will ambulate with modified independence for 150 feet with the least restrictive device within 4 day(s).   5.  Patient will ascend/descend 14 stairs with L handrail(s) with modified independence within 4 day(s).  6. Patient will perform home exercise program per protocol with independence within 4 days.  7. Patient will demonstrate AROM 0-90 degrees in operative joint within 4 days.     Outcome: Adequate for Discharge   PHYSICAL THERAPY TREATMENT    Patient: Edgar Wharton (84 y.o. male)  Date: 1/30/2025  Diagnosis: Primary osteoarthritis of right knee [M17.11] Primary osteoarthritis of right knee  Procedure(s) (LRB):  RIGHT PARTIAL KNEE REPLACEMENT (Right) 1 Day Post-Op  Precautions:                        ASSESSMENT:  Patient continues to benefit from skilled PT services and is progressing towards goals. Pt received semi fowlers in bed, agreeable and eager to participate in therapy. Pt demonstrating continued improvements in mobility. Received on RA however noted to be SOB with activity, pt reports that he uses nebulizer at home, saturating high 90s throughout mobility. Good tolerance to increased gait distance

## 2025-01-30 NOTE — DISCHARGE SUMMARY
Ortho Discharge Summary    Patient ID:  Edgar Wharton  [id number]  male  84 y.o.  1940    Admit date: 1/29/2025    Discharge date: 1/30/2025    Admitting Physician: Srini Downey MD     Consulting Physician(s):   Treatment Team:   Srini Downey MD Stewart, Wells, MD Hodge, Erin, PTA    Date of Surgery:   1/29/2025     Preoperative Diagnosis:  Primary osteoarthritis of right knee [M17.11]    Postoperative Diagnosis:   * No post-op diagnosis entered *    Procedure(s):   RIGHT PARTIAL KNEE REPLACEMENT     Anesthesia Type:   Spinal     Surgeon: Srini Downey MD                            HPI:  Pt is a 84 y.o. male who has a history of Primary osteoarthritis of right knee [M17.11]  with pain and limitations of activities of daily living who presents at this time for a right RIGHT PARTIAL KNEE REPLACEMENT following the failure of conservative management.    PMH:   Past Medical History:   Diagnosis Date    ADHD (attention deficit hyperactivity disorder)     Adverse effect of anesthesia     Could not seat LMA's x2 sizes due to leak.  no trouble with intubation 2/2106    Anticoagulant long-term use     Arthritis     Asthma     Atrial fibrillation ([facility])     CAD (coronary artery disease)     h/o 1 stent    COPD (chronic obstructive pulmonary disease) ([facility])     Diabetes ([facility])     not on medication    HOUSE (dyspnea on exertion)     due to vocal cord partial paralysis    Hearing loss of both ears     hearing aids    Hypertension     Pacemaker     [facility] rt chest    Paralysis of diaphragm 1980    history of    PVD (peripheral vascular disease) ([facility])     SSS (sick sinus syndrome) ([facility])     Unspecified sleep apnea     does not use CPAP    Vocal cord paralysis     9/9/16 pt reports hx partial vocal cord paralysis in        Body mass index is 33.68 kg/m². : A BMI > 30 is classified as obesity and > 40 is classified as morbid obesity.     Medications upon admission :   Prior to Admission Medications   Prescriptions Last

## 2025-01-30 NOTE — PROGRESS NOTES
Ortho / Neurosurgery Progress Note    POD# 1  s/p RIGHT PARTIAL KNEE REPLACEMENT   Pt seen with no visitor at bedside, feels much better today, off oxygen. He is voiding and tolerating diet. Reports pain is 2/10. He states he is ready to go home today. He [place] grandson [place] but his sons and brther are also on the property a few yards away.    Patient in bed    VSS Afebrile.    Visit Vitals  /80   Pulse 77   Temp 97.5 °F (36.4 °C) (Oral)   Resp 16   Ht 1.791 m (5' 10.5\")   Wt 108 kg (238 lb 1.6 oz)   SpO2 93%   BMI 33.68 kg/m²       Voiding status: Voiding independently.              Labs    Lab Results   Component Value Date/Time    HGB 12.1 01/30/2025 05:22 AM      Lab Results   Component Value Date/Time    INR 1.1 01/30/2025 05:22 AM    INR 2.6 04/26/2023 09:21 AM      Lab Results   Component Value Date/Time     01/30/2025 05:22 AM    K 4.1 01/30/2025 05:22 AM     01/30/2025 05:22 AM    CO2 26 01/30/2025 05:22 AM    BUN 23 01/30/2025 05:22 AM     Recent Glucose Results:   Glucose   Date Value Ref Range Status   01/30/2025 120 (H) 65 - 100 mg/dL Final   01/23/2025 120 (H) 65 - 100 mg/dL Final   12/20/2024 100 65 - 100 mg/dL Final           Body mass index is 33.68 kg/m². : A BMI > 30 is classified as obesity and > 40 is classified as morbid obesity.     Awake and alert. No acute distress.    Dressing: Silver Dressing C.D.I.   No significant erythema or swelling  Cryotherapy in place over incision.   BLE sensation to light touch intact  BLE motor intact. Strength 5/5    SCD for mechanical DVT proph while in bed        PLAN:  1) PT BID - WBAT.   2) DVT Prophylaxis: Coumadin    3) GI Prophylaxis - PEPCID  4) Pain control - scheduled tylenol  and toradol, and prn  oxycodone    5) Readiness for discharge:     [x] Vital Signs stable    [x] Labs stable    [x] + Voiding    [x] Wound intact, drainage minimal    [x] Tolerating PO intake     [x] Cleared by PT (OT if applicable) for

## 2025-02-12 RX ORDER — LOVASTATIN 40 MG/1
40 TABLET ORAL NIGHTLY
Qty: 90 TABLET | Refills: 3 | Status: SHIPPED | OUTPATIENT
Start: 2025-02-12

## 2025-02-20 DIAGNOSIS — F41.1 GENERALIZED ANXIETY DISORDER: ICD-10-CM

## 2025-02-20 RX ORDER — TAMSULOSIN HYDROCHLORIDE 0.4 MG/1
CAPSULE ORAL
Qty: 90 CAPSULE | Refills: 3 | Status: SHIPPED | OUTPATIENT
Start: 2025-02-20

## 2025-02-21 RX ORDER — CITALOPRAM HYDROBROMIDE 20 MG/1
TABLET ORAL
Qty: 90 TABLET | Refills: 3 | Status: SHIPPED | OUTPATIENT
Start: 2025-02-21

## 2025-02-24 DIAGNOSIS — F41.1 GENERALIZED ANXIETY DISORDER: ICD-10-CM

## 2025-02-24 RX ORDER — CITALOPRAM HYDROBROMIDE 20 MG/1
TABLET ORAL
Qty: 90 TABLET | Refills: 3 | OUTPATIENT
Start: 2025-02-24

## 2025-03-03 RX ORDER — PANTOPRAZOLE SODIUM 40 MG/1
40 TABLET, DELAYED RELEASE ORAL EVERY EVENING
Qty: 90 TABLET | Refills: 3 | Status: SHIPPED | OUTPATIENT
Start: 2025-03-03

## 2025-03-25 RX ORDER — LOSARTAN POTASSIUM 50 MG/1
50 TABLET ORAL DAILY
Qty: 90 TABLET | Refills: 3 | Status: SHIPPED | OUTPATIENT
Start: 2025-03-25

## 2025-03-25 RX ORDER — LOSARTAN POTASSIUM 50 MG/1
50 TABLET ORAL DAILY
Qty: 90 TABLET | Refills: 0 | OUTPATIENT
Start: 2025-03-25

## 2025-04-19 ENCOUNTER — HOSPITAL ENCOUNTER (EMERGENCY)
Facility: HOSPITAL | Age: 85
Discharge: HOME | End: 2025-04-19
Attending: STUDENT IN AN ORGANIZED HEALTH CARE EDUCATION/TRAINING PROGRAM
Payer: MEDICARE

## 2025-04-19 ENCOUNTER — APPOINTMENT (OUTPATIENT)
Facility: HOSPITAL | Age: 85
End: 2025-04-19
Payer: MEDICARE

## 2025-04-19 VITALS
BODY MASS INDEX: 34.36 KG/M2 | SYSTOLIC BLOOD PRESSURE: 114 MMHG | WEIGHT: 240 LBS | HEIGHT: 70 IN | TEMPERATURE: 99.9 F | RESPIRATION RATE: 27 BRPM | DIASTOLIC BLOOD PRESSURE: 66 MMHG | HEART RATE: 93 BPM | OXYGEN SATURATION: 92 %

## 2025-04-19 DIAGNOSIS — J18.9 ATYPICAL PNEUMONIA: Primary | ICD-10-CM

## 2025-04-19 LAB
ALBUMIN SERPL-MCNC: 3.6 G/DL (ref 3.5–5)
ALBUMIN/GLOB SERPL: 0.8 (ref 1.1–2.2)
ALP SERPL-CCNC: 81 U/L (ref 45–117)
ALT SERPL-CCNC: 19 U/L (ref 12–78)
ANION GAP SERPL CALC-SCNC: 8 MMOL/L (ref 2–12)
AST SERPL-CCNC: 20 U/L (ref 15–37)
BASOPHILS # BLD: 0.04 K/UL (ref 0–0.1)
BASOPHILS NFR BLD: 0.4 % (ref 0–1)
BILIRUB SERPL-MCNC: 0.6 MG/DL (ref 0.2–1)
BUN SERPL-MCNC: 17 MG/DL (ref 6–20)
BUN/CREAT SERPL: 13 (ref 12–20)
CALCIUM SERPL-MCNC: 9.1 MG/DL (ref 8.5–10.1)
CHLORIDE SERPL-SCNC: 102 MMOL/L (ref 97–108)
CO2 SERPL-SCNC: 23 MMOL/L (ref 21–32)
CREAT SERPL-MCNC: 1.33 MG/DL (ref 0.7–1.3)
DIFFERENTIAL METHOD BLD: ABNORMAL
EKG ATRIAL RATE: 147 BPM
EKG DIAGNOSIS: NORMAL
EKG Q-T INTERVAL: 324 MS
EKG QRS DURATION: 78 MS
EKG QTC CALCULATION (BAZETT): 438 MS
EKG R AXIS: 63 DEGREES
EKG T AXIS: 27 DEGREES
EKG VENTRICULAR RATE: 110 BPM
EOSINOPHIL # BLD: 0.02 K/UL (ref 0–0.4)
EOSINOPHIL NFR BLD: 0.2 % (ref 0–7)
ERYTHROCYTE [DISTWIDTH] IN BLOOD BY AUTOMATED COUNT: 14.4 % (ref 11.5–14.5)
FLUAV RNA SPEC QL NAA+PROBE: NOT DETECTED
FLUBV RNA SPEC QL NAA+PROBE: NOT DETECTED
GLOBULIN SER CALC-MCNC: 4.3 G/DL (ref 2–4)
GLUCOSE SERPL-MCNC: 118 MG/DL (ref 65–100)
HCT VFR BLD AUTO: 40.5 % (ref 36.6–50.3)
HGB BLD-MCNC: 13.5 G/DL (ref 12.1–17)
IMM GRANULOCYTES # BLD AUTO: 0.04 K/UL (ref 0–0.04)
IMM GRANULOCYTES NFR BLD AUTO: 0.4 % (ref 0–0.5)
INR PPP: 1.8 (ref 0.9–1.1)
LIPASE SERPL-CCNC: 40 U/L (ref 13–75)
LYMPHOCYTES # BLD: 0.59 K/UL (ref 0.8–3.5)
LYMPHOCYTES NFR BLD: 6.1 % (ref 12–49)
MAGNESIUM SERPL-MCNC: 2 MG/DL (ref 1.6–2.4)
MCH RBC QN AUTO: 31 PG (ref 26–34)
MCHC RBC AUTO-ENTMCNC: 33.3 G/DL (ref 30–36.5)
MCV RBC AUTO: 92.9 FL (ref 80–99)
MONOCYTES # BLD: 1.45 K/UL (ref 0–1)
MONOCYTES NFR BLD: 15.1 % (ref 5–13)
NEUTS SEG # BLD: 7.46 K/UL (ref 1.8–8)
NEUTS SEG NFR BLD: 77.8 % (ref 32–75)
NRBC # BLD: 0 K/UL (ref 0–0.01)
NRBC BLD-RTO: 0 PER 100 WBC
PLATELET # BLD AUTO: 178 K/UL (ref 150–400)
PMV BLD AUTO: 10.9 FL (ref 8.9–12.9)
POTASSIUM SERPL-SCNC: 4 MMOL/L (ref 3.5–5.1)
PROT SERPL-MCNC: 7.9 G/DL (ref 6.4–8.2)
PROTHROMBIN TIME: 18 SEC (ref 9.2–11.2)
RBC # BLD AUTO: 4.36 M/UL (ref 4.1–5.7)
RBC MORPH BLD: ABNORMAL
S PYO DNA THROAT QL NAA+PROBE: NOT DETECTED
SARS-COV-2 RNA RESP QL NAA+PROBE: NOT DETECTED
SODIUM SERPL-SCNC: 133 MMOL/L (ref 136–145)
SOURCE: NORMAL
TROPONIN I SERPL HS-MCNC: 12 NG/L (ref 0–76)
WBC # BLD AUTO: 9.6 K/UL (ref 4.1–11.1)

## 2025-04-19 PROCEDURE — 83735 ASSAY OF MAGNESIUM: CPT

## 2025-04-19 PROCEDURE — 85610 PROTHROMBIN TIME: CPT

## 2025-04-19 PROCEDURE — 87636 SARSCOV2 & INF A&B AMP PRB: CPT

## 2025-04-19 PROCEDURE — 71045 X-RAY EXAM CHEST 1 VIEW: CPT

## 2025-04-19 PROCEDURE — 87651 STREP A DNA AMP PROBE: CPT

## 2025-04-19 PROCEDURE — 6370000000 HC RX 637 (ALT 250 FOR IP): Performed by: STUDENT IN AN ORGANIZED HEALTH CARE EDUCATION/TRAINING PROGRAM

## 2025-04-19 PROCEDURE — 93005 ELECTROCARDIOGRAM TRACING: CPT | Performed by: STUDENT IN AN ORGANIZED HEALTH CARE EDUCATION/TRAINING PROGRAM

## 2025-04-19 PROCEDURE — 85025 COMPLETE CBC W/AUTO DIFF WBC: CPT

## 2025-04-19 PROCEDURE — 83690 ASSAY OF LIPASE: CPT

## 2025-04-19 PROCEDURE — 36415 COLL VENOUS BLD VENIPUNCTURE: CPT

## 2025-04-19 PROCEDURE — 99285 EMERGENCY DEPT VISIT HI MDM: CPT

## 2025-04-19 PROCEDURE — 84484 ASSAY OF TROPONIN QUANT: CPT

## 2025-04-19 PROCEDURE — 2580000003 HC RX 258: Performed by: STUDENT IN AN ORGANIZED HEALTH CARE EDUCATION/TRAINING PROGRAM

## 2025-04-19 PROCEDURE — 96360 HYDRATION IV INFUSION INIT: CPT

## 2025-04-19 PROCEDURE — 80053 COMPREHEN METABOLIC PANEL: CPT

## 2025-04-19 RX ORDER — AZITHROMYCIN 250 MG/1
TABLET, FILM COATED ORAL
Qty: 6 TABLET | Refills: 0 | Status: SHIPPED | OUTPATIENT
Start: 2025-04-19 | End: 2025-04-25 | Stop reason: ALTCHOICE

## 2025-04-19 RX ORDER — 0.9 % SODIUM CHLORIDE 0.9 %
500 INTRAVENOUS SOLUTION INTRAVENOUS ONCE
Status: COMPLETED | OUTPATIENT
Start: 2025-04-19 | End: 2025-04-19

## 2025-04-19 RX ORDER — ACETAMINOPHEN 500 MG
1000 TABLET ORAL
Status: COMPLETED | OUTPATIENT
Start: 2025-04-19 | End: 2025-04-19

## 2025-04-19 RX ADMIN — SODIUM CHLORIDE 500 ML: 0.9 INJECTION, SOLUTION INTRAVENOUS at 09:17

## 2025-04-19 RX ADMIN — ACETAMINOPHEN 1000 MG: 500 TABLET, FILM COATED ORAL at 09:17

## 2025-04-19 NOTE — ED PROVIDER NOTES
[facility] EMERGENCY DEPARTMENT  EMERGENCY DEPARTMENT ENCOUNTER       Pt Name: Edgar Wharton  MRN: [id number]  Birthdate 1940  Date of evaluation: 4/19/2025  Provider: Abdirahman Mendoza MD   PCP: Francisco Javier Aguilar MD  Note Started: 10:01 AM EDT 4/19/25     CHIEF COMPLAINT       Chief Complaint   Patient presents with    Chest Pain     Patient reports substernal chest pain that started 2 days ago, patient reports hx of AFIB. Patient reports sharp chest pain 8/10. Patient reports SOB with exertion, patient reports restricted vocal cords, patient took his neb treatment at home with no relief.         HISTORY OF PRESENT ILLNESS: 1 or more elements      History From: Patient  HPI Limitations: None     Edgar Wharton is a 85 y.o. male who presents with a known history of atrial fibrillation on warfarin presents with palpitations, sore throat, and fever. The patient reports experiencing a fever since Thursday evening, ranging from 101 to 102 degrees Fahrenheit. He states that he has been in and out of atrial fibrillation for the past 5 days, with his heart rate staying elevated between 125-135 beats per minute, and occasionally reaching as high as 163 beats per minute.    The patient complains of a sore throat and reports that his voice has become hoarse. He attributes this to his throat being dry. He also mentions some coughing, which he manages with a nebulizer. Additionally, the patient reports nasal congestion.     He also mentions taking Tylenol for his fever, though the exact timing is not specified. Despite the Tylenol, he reports that his temperature wouldn't come down below 100 degrees.    The patient denies any chest pain, nausea, or vomiting. He does not report any specific aggravating or alleviating factors for his symptoms, nor does he mention any impact on his daily functioning.    Medical History  - Atrial fibrillation (AFib), long-standing history  - Chronic obstructive pulmonary disease (COPD)  or asthma       Nursing Notes were all reviewed and agreed with or any disagreements were addressed in the HPI.     REVIEW OF SYSTEMS      Review of Systems     Positives and Pertinent negatives as per HPI.    PAST HISTORY     Past Medical History:  Past Medical History:   Diagnosis Date    ADHD (attention deficit hyperactivity disorder)     Adverse effect of anesthesia     Could not seat LMA's x2 sizes due to leak.  no trouble with intubation 2/2106    Anticoagulant long-term use     Arthritis     Asthma     Atrial fibrillation (H[facility])     CAD (coronary artery disease)     h/o 1 stent    COPD (chronic obstructive pulmonary disease) ([facility])     Diabetes (HCC)     not on medication    HOUSE (dyspnea on exertion)     due to vocal cord partial paralysis    Hearing loss of both ears     hearing aids    Hypertension     Pacemaker     [facility] rt chest    Paralysis of diaphragm 1980    history of    PVD (peripheral vascular disease)     SSS (sick sinus syndrome) ([facility])     Unspecified sleep apnea     does not use CPAP    Vocal cord paralysis     9/9/16 pt reports hx partial vocal cord paralysis in          Past Surgical History:  Past Surgical History:   Procedure Laterality Date    CORONARY ANGIOPLASTY WITH STENT PLACEMENT      2011    EYE SURGERY      JOINT REPLACEMENT Right 1/29/2025    RIGHT PARTIAL KNEE REPLACEMENT performed by Srini Downey MD at [facility] MAIN OR    KNEE ARTHROSCOPY Right     OTHER SURGICAL HISTORY      missing 4th right toe-shot his toe off and had surgery to close the wound    PACEMAKER      right sided  [facility]    REVERSE TOTAL SHOULDER ARTHROPLASTY Left     ROTATOR CUFF REPAIR Bilateral     SINUS SURGERY      TOTAL KNEE ARTHROPLASTY Left 2019       Family History:  Family History   Problem Relation Age of Onset    Heart Disease Mother     Other Father         mrsa; sepsis    Cancer Father         prostate    Heart Disease Father     Heart Attack Sister 71    Prostate Cancer Brother        Social

## 2025-04-25 ENCOUNTER — OFFICE VISIT (OUTPATIENT)
Age: 85
End: 2025-04-25
Payer: MEDICARE

## 2025-04-25 VITALS
TEMPERATURE: 97.8 F | HEART RATE: 72 BPM | SYSTOLIC BLOOD PRESSURE: 99 MMHG | BODY MASS INDEX: 32.91 KG/M2 | HEIGHT: 70 IN | RESPIRATION RATE: 18 BRPM | DIASTOLIC BLOOD PRESSURE: 69 MMHG | OXYGEN SATURATION: 94 % | WEIGHT: 229.9 LBS

## 2025-04-25 DIAGNOSIS — I48.20 CHRONIC ATRIAL FIBRILLATION (HCC): ICD-10-CM

## 2025-04-25 DIAGNOSIS — I10 BENIGN HYPERTENSION: ICD-10-CM

## 2025-04-25 DIAGNOSIS — Z79.01 LONG TERM (CURRENT) USE OF ANTICOAGULANTS: ICD-10-CM

## 2025-04-25 DIAGNOSIS — J44.9 CHRONIC OBSTRUCTIVE PULMONARY DISEASE, UNSPECIFIED COPD TYPE (HCC): ICD-10-CM

## 2025-04-25 DIAGNOSIS — J18.9 ATYPICAL PNEUMONIA: Primary | ICD-10-CM

## 2025-04-25 DIAGNOSIS — E11.49 TYPE 2 DIABETES MELLITUS WITH OTHER DIABETIC NEUROLOGICAL COMPLICATION (HCC): ICD-10-CM

## 2025-04-25 LAB
POC INR: 3.2
PROTHROMBIN TIME, POC: 38.9

## 2025-04-25 PROCEDURE — PBSHW AMB POC PT/INR: Performed by: FAMILY MEDICINE

## 2025-04-25 PROCEDURE — 3078F DIAST BP <80 MM HG: CPT | Performed by: FAMILY MEDICINE

## 2025-04-25 PROCEDURE — 3023F SPIROM DOC REV: CPT | Performed by: FAMILY MEDICINE

## 2025-04-25 PROCEDURE — 99214 OFFICE O/P EST MOD 30 MIN: CPT | Performed by: FAMILY MEDICINE

## 2025-04-25 PROCEDURE — 1159F MED LIST DOCD IN RCRD: CPT | Performed by: FAMILY MEDICINE

## 2025-04-25 PROCEDURE — 1036F TOBACCO NON-USER: CPT | Performed by: FAMILY MEDICINE

## 2025-04-25 PROCEDURE — G8417 CALC BMI ABV UP PARAM F/U: HCPCS | Performed by: FAMILY MEDICINE

## 2025-04-25 PROCEDURE — G8427 DOCREV CUR MEDS BY ELIG CLIN: HCPCS | Performed by: FAMILY MEDICINE

## 2025-04-25 PROCEDURE — 85610 PROTHROMBIN TIME: CPT | Performed by: FAMILY MEDICINE

## 2025-04-25 PROCEDURE — 1123F ACP DISCUSS/DSCN MKR DOCD: CPT | Performed by: FAMILY MEDICINE

## 2025-04-25 PROCEDURE — 3074F SYST BP LT 130 MM HG: CPT | Performed by: FAMILY MEDICINE

## 2025-04-25 PROCEDURE — 1125F AMNT PAIN NOTED PAIN PRSNT: CPT | Performed by: FAMILY MEDICINE

## 2025-04-25 RX ORDER — BUDESONIDE 1 MG/2ML
1 INHALANT ORAL 2 TIMES DAILY
COMMUNITY
Start: 2025-04-07

## 2025-04-25 SDOH — ECONOMIC STABILITY: FOOD INSECURITY: WITHIN THE PAST 12 MONTHS, YOU WORRIED THAT YOUR FOOD WOULD RUN OUT BEFORE YOU GOT MONEY TO BUY MORE.: NEVER TRUE

## 2025-04-25 SDOH — ECONOMIC STABILITY: FOOD INSECURITY: WITHIN THE PAST 12 MONTHS, THE FOOD YOU BOUGHT JUST DIDN'T LAST AND YOU DIDN'T HAVE MONEY TO GET MORE.: NEVER TRUE

## 2025-04-25 ASSESSMENT — PATIENT HEALTH QUESTIONNAIRE - PHQ9
6. FEELING BAD ABOUT YOURSELF - OR THAT YOU ARE A FAILURE OR HAVE LET YOURSELF OR YOUR FAMILY DOWN: SEVERAL DAYS
8. MOVING OR SPEAKING SO SLOWLY THAT OTHER PEOPLE COULD HAVE NOTICED. OR THE OPPOSITE, BEING SO FIGETY OR RESTLESS THAT YOU HAVE BEEN MOVING AROUND A LOT MORE THAN USUAL: NOT AT ALL
SUM OF ALL RESPONSES TO PHQ QUESTIONS 1-9: 7
4. FEELING TIRED OR HAVING LITTLE ENERGY: MORE THAN HALF THE DAYS
1. LITTLE INTEREST OR PLEASURE IN DOING THINGS: SEVERAL DAYS
SUM OF ALL RESPONSES TO PHQ QUESTIONS 1-9: 7
9. THOUGHTS THAT YOU WOULD BE BETTER OFF DEAD, OR OF HURTING YOURSELF: NOT AT ALL
10. IF YOU CHECKED OFF ANY PROBLEMS, HOW DIFFICULT HAVE THESE PROBLEMS MADE IT FOR YOU TO DO YOUR WORK, TAKE CARE OF THINGS AT HOME, OR GET ALONG WITH OTHER PEOPLE: SOMEWHAT DIFFICULT
SUM OF ALL RESPONSES TO PHQ QUESTIONS 1-9: 7
5. POOR APPETITE OR OVEREATING: NOT AT ALL
2. FEELING DOWN, DEPRESSED OR HOPELESS: SEVERAL DAYS
3. TROUBLE FALLING OR STAYING ASLEEP: MORE THAN HALF THE DAYS
7. TROUBLE CONCENTRATING ON THINGS, SUCH AS READING THE NEWSPAPER OR WATCHING TELEVISION: NOT AT ALL
SUM OF ALL RESPONSES TO PHQ QUESTIONS 1-9: 7

## 2025-04-25 NOTE — PROGRESS NOTES
HPI  Edgar Wharton is a 85 y.o. male who presents to follow-up hospital [facility] 4/19.  Reported fever since 2 days prior to admission ranging from 101-102.  In and out of A-fib for 5 days rates as high as 135 bpm    Chest x-ray showed \"new airspace disease in the left upper lobe likely infectious or inflammatory\".  No WBC elevation.  No oxygen requirement.  Treated as an atypical pneumonia with Augmentin/azithromycin    Has been resting and recovering at home.  No new fever.  Still had some episodes of A-fib for the first few days of his antibiotic and that he feels like he turned a bit of a corner.  Felt like he had a little bit more energy starting 2 days ago.  Felt like he could finally take a breath without his lungs hurting yesterday.    Still feeling a little short of breath with exertion.  Not feeling short of breath at rest.    Using his nebulized Pulmicort and arformoterol and feels like that was working well.        PMHx:  Past Medical History:   Diagnosis Date    ADHD (attention deficit hyperactivity disorder)     Adverse effect of anesthesia     Could not seat LMA's x2 sizes due to leak.  no trouble with intubation 2/2106    Anticoagulant long-term use     Arthritis     Asthma     Atrial fibrillation (HCC)     CAD (coronary artery disease)     h/o 1 stent    COPD (chronic obstructive pulmonary disease) (H[facility])     Diabetes (HCC)     not on medication    HOUSE (dyspnea on exertion)     due to vocal cord partial paralysis    Hearing loss of both ears     hearing aids    Hypertension     Pacemaker     [facility] rt chest    Paralysis of diaphragm 1980    history of    PVD (peripheral vascular disease)     SSS (sick sinus syndrome) ([facility])     Unspecified sleep apnea     does not use CPAP    Vocal cord paralysis     9/9/16 pt reports hx partial vocal cord paralysis in        Meds:   Current Outpatient Medications   Medication Sig Dispense Refill    budesonide (PULMICORT) 1 MG/2ML nebulizer suspension

## 2025-04-25 NOTE — PROGRESS NOTES
Health Maintenance Due   Topic Date Due    Diabetic retinal exam  Never done    DTaP/Tdap/Td vaccine (1 - Tdap) Never done    Shingles vaccine (1 of 2) Never done    Respiratory Syncytial Virus (RSV) Pregnant or age 60 yrs+ (1 - 1-dose 75+ series) Never done    Diabetic foot exam  07/02/2022    Diabetic Alb to Cr ratio (uACR) test  11/11/2023    COVID-19 Vaccine (3 - 2024-25 season) 09/01/2024

## 2025-05-02 ENCOUNTER — HOSPITAL ENCOUNTER (OUTPATIENT)
Facility: HOSPITAL | Age: 85
Discharge: HOME | End: 2025-05-02
Payer: MEDICARE

## 2025-05-02 ENCOUNTER — TRANSCRIBE ORDERS (OUTPATIENT)
Facility: HOSPITAL | Age: 85
End: 2025-05-02

## 2025-05-02 DIAGNOSIS — J98.4 RESTRICTIVE LUNG DISEASE: ICD-10-CM

## 2025-05-02 DIAGNOSIS — J98.4 RESTRICTIVE LUNG DISEASE: Primary | ICD-10-CM

## 2025-05-02 PROCEDURE — 71046 X-RAY EXAM CHEST 2 VIEWS: CPT

## 2025-05-09 ENCOUNTER — CLINICAL SUPPORT (OUTPATIENT)
Age: 85
End: 2025-05-09
Payer: MEDICARE

## 2025-05-09 DIAGNOSIS — Z79.01 LONG TERM (CURRENT) USE OF ANTICOAGULANTS: Primary | ICD-10-CM

## 2025-05-09 LAB
POC INR: 4.1
PROTHROMBIN TIME, POC: 38.8

## 2025-05-09 PROCEDURE — PBSHW AMB POC PT/INR: Performed by: FAMILY MEDICINE

## 2025-05-09 PROCEDURE — 85610 PROTHROMBIN TIME: CPT | Performed by: FAMILY MEDICINE

## 2025-05-09 NOTE — PROGRESS NOTES
The patient identity was confirmed with  and First/Last Name. Medication and dose reviewed with patient, as well as any new diagnosis/procedures.     Edgar Wharton is a 85 y.o. male who presents today for Anticoagulation monitoring.    Indication: atrial fibrillation  INR Goal: 2.0-3.0.  Current dose:  Coumadin 5mg daily, except 2.5mg Wed/Fri.  Missed Coumadin Doses:  None  Medication Changes:  Prednisone  Dietary Changes:  no    Symptoms: taking coumadin appropriately without any bleeding.    Latest INRs:  Lab Results   Component Value Date/Time    INR 4.1 2025 08:44 AM    INR 3.2 2025 12:13 PM    INR 1.8 2025 09:07 AM    INR 1.1 2025 05:22 AM    INR 1.1 2025 04:50 PM    INR 2.6 2023 09:21 AM    INR 2.9 2023 09:21 AM    INR 2.8 2023 09:31 AM        New Coumadin dose:.current treatment plan is effective, no change in therapy, the following changes are made - hold 2 days, then 5mg Tues,Thurs,Sat. 2.5mg other days..    Next check to be scheduled for  2 weeks.

## 2025-05-12 RX ORDER — ATENOLOL 50 MG/1
50 TABLET ORAL DAILY
Qty: 90 TABLET | Refills: 3 | Status: SHIPPED | OUTPATIENT
Start: 2025-05-12

## 2025-05-19 ENCOUNTER — TRANSCRIBE ORDERS (OUTPATIENT)
Facility: HOSPITAL | Age: 85
End: 2025-05-19

## 2025-05-19 ENCOUNTER — HOSPITAL ENCOUNTER (OUTPATIENT)
Facility: HOSPITAL | Age: 85
Discharge: HOME | End: 2025-05-22
Payer: MEDICARE

## 2025-05-19 DIAGNOSIS — J18.9 PNEUMONIA OF LEFT UPPER LOBE DUE TO INFECTIOUS ORGANISM: Primary | ICD-10-CM

## 2025-05-19 DIAGNOSIS — J18.9 PNEUMONIA OF LEFT UPPER LOBE DUE TO INFECTIOUS ORGANISM: ICD-10-CM

## 2025-05-19 PROCEDURE — 71046 X-RAY EXAM CHEST 2 VIEWS: CPT

## 2025-05-21 ENCOUNTER — CLINICAL SUPPORT (OUTPATIENT)
Age: 85
End: 2025-05-21
Payer: MEDICARE

## 2025-05-21 VITALS
WEIGHT: 230 LBS | DIASTOLIC BLOOD PRESSURE: 67 MMHG | RESPIRATION RATE: 18 BRPM | BODY MASS INDEX: 32.93 KG/M2 | OXYGEN SATURATION: 95 % | SYSTOLIC BLOOD PRESSURE: 111 MMHG | TEMPERATURE: 97.7 F | HEIGHT: 70 IN | HEART RATE: 57 BPM

## 2025-05-21 DIAGNOSIS — I10 BENIGN HYPERTENSION: ICD-10-CM

## 2025-05-21 DIAGNOSIS — Z01.810 PREOP CARDIOVASCULAR EXAM: Primary | ICD-10-CM

## 2025-05-21 DIAGNOSIS — Z79.01 LONG TERM (CURRENT) USE OF ANTICOAGULANTS: ICD-10-CM

## 2025-05-21 DIAGNOSIS — I48.20 CHRONIC ATRIAL FIBRILLATION (HCC): ICD-10-CM

## 2025-05-21 LAB
POC INR: 1.6
PROTHROMBIN TIME, POC: 19.5

## 2025-05-21 PROCEDURE — 93010 ELECTROCARDIOGRAM REPORT: CPT | Performed by: FAMILY MEDICINE

## 2025-05-21 PROCEDURE — PBSHW AMB POC PT/INR: Performed by: FAMILY MEDICINE

## 2025-05-21 PROCEDURE — 93005 ELECTROCARDIOGRAM TRACING: CPT | Performed by: FAMILY MEDICINE

## 2025-05-21 PROCEDURE — 99214 OFFICE O/P EST MOD 30 MIN: CPT | Performed by: FAMILY MEDICINE

## 2025-05-21 PROCEDURE — 85610 PROTHROMBIN TIME: CPT | Performed by: FAMILY MEDICINE

## 2025-05-21 PROCEDURE — 3074F SYST BP LT 130 MM HG: CPT | Performed by: FAMILY MEDICINE

## 2025-05-21 PROCEDURE — 1159F MED LIST DOCD IN RCRD: CPT | Performed by: FAMILY MEDICINE

## 2025-05-21 PROCEDURE — 1123F ACP DISCUSS/DSCN MKR DOCD: CPT | Performed by: FAMILY MEDICINE

## 2025-05-21 PROCEDURE — 1126F AMNT PAIN NOTED NONE PRSNT: CPT | Performed by: FAMILY MEDICINE

## 2025-05-21 PROCEDURE — 3078F DIAST BP <80 MM HG: CPT | Performed by: FAMILY MEDICINE

## 2025-05-21 NOTE — PROGRESS NOTES
The patient identity was confirmed with  and First/Last Name. Medication and dose reviewed with patient, as well as any new diagnosis/procedures.     Edgar Wharton is a 85 y.o. male who presents today for Anticoagulation monitoring.    Indication: atrial fibrillation  INR Goal: 2.0-3.0.  Current dose:  Coumadin 5mg daily, except 2.5mg Wed/Fri.  Missed Coumadin Doses:  None  Medication Changes:  no  Dietary Changes:  no    Symptoms: taking coumadin appropriately without any bleeding.    Latest INRs:  Lab Results   Component Value Date/Time    INR 1.6 2025 10:25 AM    INR 4.1 2025 08:44 AM    INR 3.2 2025 12:13 PM    INR 1.8 2025 09:07 AM    INR 1.1 2025 05:22 AM    INR 1.1 2025 04:50 PM    INR 2.6 2023 09:21 AM    INR 2.9 2023 09:21 AM    INR 2.8 2023 09:31 AM        New Coumadin dose:.current treatment plan is effective, no change in therapy, the following changes are made - Same.    Next check to be scheduled for  2 weeks.  
The patient identity was confirmed with  and First/Last Name. Medications and Allergies reviewed with patient, as well as any new diagnosis/procedures.     Chief Complaint   Patient presents with    Office Visit for Anticoagulation Management    Pre-op Exam        Vitals:    25 1040   BP: 111/67   Pulse: 57   Resp: 18   Temp: 97.7 °F (36.5 °C)       Health Maintenance Due   Topic Date Due    Diabetic retinal exam  Never done    DTaP/Tdap/Td vaccine (1 - Tdap) Never done    Shingles vaccine (1 of 2) Never done    Respiratory Syncytial Virus (RSV) Pregnant or age 60 yrs+ (1 - 1-dose 75+ series) Never done    Diabetic foot exam  2022    Diabetic Alb to Cr ratio (uACR) test  2023    COVID-19 Vaccine (3 - 2024-25 season) 2024    A1C test (Diabetic or Prediabetic)  2025          \"Have you been to the ER, urgent care clinic since your last visit?  Hospitalized since your last visit?\"    NO    “Have you seen or consulted any other health care providers outside our system since your last visit?”    NO      “Have you had a diabetic eye exam?”    NO     No diabetic eye exam on file           
(PROTONIX) 40 MG tablet TAKE 1 TABLET BY MOUTH EVERY EVENING 90 tablet 3    citalopram (CELEXA) 20 MG tablet TAKE 1 TABLET BY MOUTH DAILY 90 tablet 3    tamsulosin (FLOMAX) 0.4 MG capsule TAKE 1 CAPSULE BY MOUTH DAILY 90 capsule 3    lovastatin (MEVACOR) 40 MG tablet TAKE 1 TABLET BY MOUTH EVERY NIGHT 90 tablet 3    famotidine (PEPCID) 20 MG tablet Take 1 tablet by mouth 2 times daily 60 tablet 3    mupirocin (BACTROBAN) 2 % ointment Apply topically in the morning and at bedtime Take twice a day for 5 days-apply pea sized amount on q-tip and apply to both nostrils      furosemide (LASIX) 20 MG tablet Take 1 tablet by mouth daily      warfarin (COUMADIN) 5 MG tablet TAKE 1 TABLET(5 MG) BY MOUTH DAILY. EXCEPT 2.5 MG HALF TABLET THURS/ SATURDAY 90 tablet 3    losartan (COZAAR) 25 MG tablet Take 1 tablet by mouth daily      potassium chloride (K-TAB) 20 MEQ TBCR extended release tablet Take 1 tablet by mouth daily      gemfibrozil (LOPID) 600 MG tablet TAKE 1 TABLET BY MOUTH TWICE DAILY 180 tablet 3    arformoterol tartrate (BROVANA) 15 MCG/2ML NEBU Take 1 ampule by nebulization 2 times daily      budesonide (PULMICORT) 0.5 MG/2ML nebulizer suspension Take 2 mLs by nebulization 2 times daily      acetaminophen (TYLENOL) 500 MG tablet Take 2 tablets by mouth every 6 hours as needed for Pain (Patient not taking: Reported on 1/29/2025)      albuterol sulfate HFA (PROVENTIL;VENTOLIN;PROAIR) 108 (90 Base) MCG/ACT inhaler Inhale 1 puff into the lungs every 6 hours as needed (Patient not taking: Reported on 1/29/2025)       No current facility-administered medications for this visit.       Allergies:   Allergies   Allergen Reactions    Ace Inhibitors Cough    Bupivacaine Hives, Itching, Other (See Comments) and Rash     Wheezing       Smoker:  Social History     Tobacco Use   Smoking Status Former    Current packs/day: 0.00    Average packs/day: 2.0 packs/day for 13.0 years (26.0 ttl pk-yrs)    Types: Cigarettes    Start date:

## 2025-05-22 ENCOUNTER — RESULTS FOLLOW-UP (OUTPATIENT)
Age: 85
End: 2025-05-22

## 2025-05-30 ENCOUNTER — CLINICAL DOCUMENTATION (OUTPATIENT)
Age: 85
End: 2025-05-30

## 2025-06-24 ENCOUNTER — CLINICAL SUPPORT (OUTPATIENT)
Age: 85
End: 2025-06-24
Payer: MEDICARE

## 2025-06-24 DIAGNOSIS — Z79.01 LONG TERM (CURRENT) USE OF ANTICOAGULANTS: Primary | ICD-10-CM

## 2025-06-24 LAB
POC INR: 1.6
PROTHROMBIN TIME, POC: 18.9

## 2025-06-24 PROCEDURE — 85610 PROTHROMBIN TIME: CPT

## 2025-06-24 PROCEDURE — PBSHW AMB POC PT/INR

## 2025-06-24 NOTE — PROGRESS NOTES
Edgar Wharton is a 85 y.o. male who presents today for Anticoagulation monitoring.    Indication: atrial fibrillation  INR Goal: 2.0-3.0.  Current dose:  Coumadin 5 mg daily, except 2.5 mg Thurs/Sat.  Missed Coumadin Doses:  None  Medication Changes:  no  Dietary Changes:  no    Symptoms: taking coumadin appropriately without any bleeding.    Latest INRs:  Lab Results   Component Value Date/Time    INR 1.6 05/21/2025 10:25 AM    INR 4.1 05/09/2025 08:44 AM    INR 3.2 04/25/2025 12:13 PM    INR 1.8 04/19/2025 09:07 AM    INR 1.1 01/30/2025 05:22 AM    INR 1.1 01/29/2025 04:50 PM    INR 2.6 04/26/2023 09:21 AM    INR 2.9 04/05/2023 09:21 AM    INR 2.8 03/22/2023 09:31 AM        New Coumadin dose:.the following changes are made - see anti-coag calendar.    Next check to be scheduled for  2 weeks.

## 2025-07-08 ENCOUNTER — CLINICAL SUPPORT (OUTPATIENT)
Age: 85
End: 2025-07-08
Payer: MEDICARE

## 2025-07-08 DIAGNOSIS — M54.2 MYOFASCIAL NECK PAIN: Primary | ICD-10-CM

## 2025-07-08 DIAGNOSIS — I48.20 CHRONIC ATRIAL FIBRILLATION (HCC): ICD-10-CM

## 2025-07-08 LAB
POC INR: 1
PROTHROMBIN TIME, POC: 12.3

## 2025-07-08 PROCEDURE — PBSHW AMB POC PT/INR: Performed by: FAMILY MEDICINE

## 2025-07-08 PROCEDURE — 85610 PROTHROMBIN TIME: CPT | Performed by: FAMILY MEDICINE

## 2025-07-08 PROCEDURE — 99213 OFFICE O/P EST LOW 20 MIN: CPT | Performed by: FAMILY MEDICINE

## 2025-07-08 PROCEDURE — 1123F ACP DISCUSS/DSCN MKR DOCD: CPT | Performed by: FAMILY MEDICINE

## 2025-07-08 NOTE — PROGRESS NOTES
HPI  Edgar Wharton is a 85 y.o. male who presents for Coumadin check.    He recently hadTracheostomy.  Recall that he had a chronically partially paralyzed vocal cord that was causing his shortness of breath.  Since his tracheostomy he does not necessarily feel like there has been an upgrade.  He has a lot of mucus production and it does not sound like he has the suction equipment that he feels he needs at home.  He will be seeing his ENT next week in follow-up.      PMHx:  Past Medical History:   Diagnosis Date    ADHD (attention deficit hyperactivity disorder)     Adverse effect of anesthesia     Could not seat LMA's x2 sizes due to leak.  no trouble with intubation 2/2106    Anticoagulant long-term use     Arthritis     Asthma     Atrial fibrillation ([facility])     CAD (coronary artery disease)     h/o 1 stent    COPD (chronic obstructive pulmonary disease) ([facility])     Diabetes ([facility])     not on medication    HOUSE (dyspnea on exertion)     due to vocal cord partial paralysis    Hearing loss of both ears     hearing aids    Hypertension     Pacemaker     [facility] rt chest    Paralysis of diaphragm 1980    history of    PVD (peripheral vascular disease)     SSS (sick sinus syndrome) ([facility])     Unspecified sleep apnea     does not use CPAP    Vocal cord paralysis     9/9/16 pt reports hx partial vocal cord paralysis in        Meds:   Current Outpatient Medications   Medication Sig Dispense Refill    atenolol (TENORMIN) 50 MG tablet TAKE 1 TABLET BY MOUTH DAILY 90 tablet 3    budesonide (PULMICORT) 1 MG/2ML nebulizer suspension Take 2 mLs by nebulization 2 times daily      losartan (COZAAR) 50 MG tablet TAKE 1 TABLET BY MOUTH DAILY 90 tablet 3    pantoprazole (PROTONIX) 40 MG tablet TAKE 1 TABLET BY MOUTH EVERY EVENING 90 tablet 3    citalopram (CELEXA) 20 MG tablet TAKE 1 TABLET BY MOUTH DAILY 90 tablet 3    tamsulosin (FLOMAX) 0.4 MG capsule TAKE 1 CAPSULE BY MOUTH DAILY 90 capsule 3    lovastatin (MEVACOR) 40 MG tablet

## 2025-07-22 ENCOUNTER — CLINICAL SUPPORT (OUTPATIENT)
Age: 85
End: 2025-07-22
Payer: MEDICARE

## 2025-07-22 DIAGNOSIS — I48.20 CHRONIC ATRIAL FIBRILLATION (HCC): Primary | ICD-10-CM

## 2025-07-22 LAB
POC INR: 3.7
PROTHROMBIN TIME, POC: 44.9

## 2025-07-22 PROCEDURE — 85610 PROTHROMBIN TIME: CPT | Performed by: FAMILY MEDICINE

## 2025-07-22 PROCEDURE — PBSHW AMB POC PT/INR: Performed by: FAMILY MEDICINE

## 2025-07-22 NOTE — PROGRESS NOTES
The patient identity was confirmed with  and First/Last Name. Medication and dose reviewed with patient, as well as any new diagnosis/procedures.     Edgar Wharton is a 85 y.o. male who presents today for Anticoagulation monitoring.    Indication: atrial fibrillation  INR Goal: 2.0-3.0.  Current dose:  Coumadin 5mg daily, except 7.5mg on Tues/Thurs.  Missed Coumadin Doses:  yes- missed doses, and tried to get back on track before visit- will let patient discuss with provider.  Medication Changes:  no  Dietary Changes:  no    Symptoms: taking coumadin appropriately without any bleeding.    Latest INRs:  Lab Results   Component Value Date/Time    INR 3.7 2025 09:54 AM    INR 1.0 2025 09:27 AM    INR 1.6 2025 09:31 AM    INR 1.8 2025 09:07 AM    INR 1.1 2025 05:22 AM    INR 1.1 2025 04:50 PM    INR 2.6 2023 09:21 AM    INR 2.9 2023 09:21 AM    INR 2.8 2023 09:31 AM        New Coumadin dose:.current treatment plan is effective, no change in therapy, the following changes are made - See Anti-Coag Calendar.    Next check to be scheduled for  2 weeks.

## 2025-07-23 RX ORDER — METOPROLOL SUCCINATE 50 MG/1
50 TABLET, EXTENDED RELEASE ORAL DAILY
Qty: 90 TABLET | Refills: 3 | Status: SHIPPED | OUTPATIENT
Start: 2025-07-23

## 2025-08-06 ENCOUNTER — CLINICAL SUPPORT (OUTPATIENT)
Age: 85
End: 2025-08-06
Payer: MEDICARE

## 2025-08-06 DIAGNOSIS — Z79.01 LONG TERM (CURRENT) USE OF ANTICOAGULANTS: Primary | ICD-10-CM

## 2025-08-06 LAB
POC INR: 2.8
PROTHROMBIN TIME, POC: 33.1

## 2025-08-06 PROCEDURE — PBSHW AMB POC PT/INR: Performed by: FAMILY MEDICINE

## 2025-08-06 PROCEDURE — 85610 PROTHROMBIN TIME: CPT | Performed by: FAMILY MEDICINE

## 2025-08-20 ENCOUNTER — CLINICAL SUPPORT (OUTPATIENT)
Age: 85
End: 2025-08-20
Payer: MEDICARE

## 2025-08-20 DIAGNOSIS — I48.20 CHRONIC ATRIAL FIBRILLATION (HCC): Primary | ICD-10-CM

## 2025-08-20 LAB
POC INR: 2.7
PROTHROMBIN TIME, POC: 32.1

## 2025-08-20 PROCEDURE — PBSHW AMB POC PT/INR: Performed by: FAMILY MEDICINE

## 2025-08-20 PROCEDURE — 85610 PROTHROMBIN TIME: CPT | Performed by: FAMILY MEDICINE

## 2025-08-22 RX ORDER — GEMFIBROZIL 600 MG/1
600 TABLET, FILM COATED ORAL 2 TIMES DAILY
Qty: 180 TABLET | Refills: 3 | Status: SHIPPED | OUTPATIENT
Start: 2025-08-22

## (undated) DEVICE — SYSTEM MIXING AND DELIVERY BONE CEMENT MEDIUM VISCOSITY

## (undated) DEVICE — SUTURE MONOCRYL SZ 2-0 L36IN ABSRB UD L36MM CT-1 1/2 CIR Y945H

## (undated) DEVICE — SOLUTION WND IRRIGATION 450 ML 0.5 PVP-I 0.9 NACL

## (undated) DEVICE — PIN BNE FIX L110MM DIA32MM

## (undated) DEVICE — HOOD: Brand: FLYTE

## (undated) DEVICE — SUTURE ETHBND EXCEL SZ 1 L30IN NONABSORBABLE GRN L36MM CT-1 X425H

## (undated) DEVICE — (D)PREP SKN CHLRAPRP APPL 26ML -- CONVERT TO ITEM 371833

## (undated) DEVICE — ELECTRODE PT RET AD L9FT HI MOIST COND ADH HYDRGEL CORDED

## (undated) DEVICE — PLASMABLADE PS200-040 4.0: Brand: PLASMABLADE™

## (undated) DEVICE — GOWN,SIRUS,NONRNF,SETINSLV,2XL,18/CS: Brand: MEDLINE

## (undated) DEVICE — 4-PORT MANIFOLD: Brand: NEPTUNE 2

## (undated) DEVICE — SYR 50ML LR LCK 1ML GRAD NSAF --

## (undated) DEVICE — SUTURE PERMAHAND SZ 0 L30IN NONABSORBABLE BLK L26MM SH 1/2 K834H

## (undated) DEVICE — 3M™ IOBAN™ 2 ANTIMICROBIAL INCISE DRAPE 6651EZ: Brand: IOBAN™ 2

## (undated) DEVICE — INFECTION CONTROL KIT SYS

## (undated) DEVICE — BLADE, TONGUE, 6", STERILE: Brand: MEDLINE

## (undated) DEVICE — TUBING IRRIG HI FLO RIO SYS ANSPACH EMAX 2

## (undated) DEVICE — KIT TRK KNEE PROC VIZADISC

## (undated) DEVICE — MEDI-VAC SUCTION HIGH CAPACITY: Brand: CARDINAL HEALTH

## (undated) DEVICE — SOLUTION ANTISEP 70% ISO ALC RUBBING 4 OZ

## (undated) DEVICE — 3M™ IOBAN™ 2 ANTIMICROBIAL INCISE DRAPE 6648EZ: Brand: IOBAN™ 2

## (undated) DEVICE — PIN BNE FIX L140MM DIA3.2MM SELF DRL

## (undated) DEVICE — LIQUIBAND RAPID ADHESIVE 36/CS 0.8ML: Brand: MEDLINE

## (undated) DEVICE — GLOVE SURG SZ 85 L12IN FNGR THK79MIL GRN LTX FREE

## (undated) DEVICE — SUTURE ABSORBABLE BRAIDED 2-0 CT-1 27 IN UD VICRYL J259H

## (undated) DEVICE — HOOK LOCK LATEX FREE ELASTIC BANDAGE D/L 6INX10YD

## (undated) DEVICE — DRAIN KT WND 10FR RND 400ML --

## (undated) DEVICE — SOLUTION IRRIG 1000ML STRL H2O USP PLAS POUR BTL

## (undated) DEVICE — HANDPIECE SET WITH COAXIAL HIGH FLOW TIP AND SUCTION TUBE: Brand: INTERPULSE

## (undated) DEVICE — DUAL CUT SAGITTAL BLADE

## (undated) DEVICE — BANDAGE COMPR M W6INXL10YD WHT BGE VELC E MTRX HK AND LOOP

## (undated) DEVICE — 3M™ TEGADERM™ TRANSPARENT FILM DRESSING FRAME STYLE, 1626W, 4 IN X 4-3/4 IN (10 CM X 12 CM), 50/CT 4CT/CASE: Brand: 3M™ TEGADERM™

## (undated) DEVICE — CEMENT MIXING SYSTEM WITH FEMORAL BREAKWAY NOZZLE: Brand: REVOLUTION

## (undated) DEVICE — STERILE POLYISOPRENE POWDER-FREE SURGICAL GLOVES: Brand: PROTEXIS

## (undated) DEVICE — SUTURE COAT VCRL SZ 4-0 L18IN ABSRB UD L19MM PS-2 1/2 CIR J496G

## (undated) DEVICE — STRIP,CLOSURE,WOUND,MEDI-STRIP,1/2X4: Brand: MEDLINE

## (undated) DEVICE — 3M™ IOBAN™ 2 ANTIMICROBIAL INCISE DRAPE 6650EZ: Brand: IOBAN™ 2

## (undated) DEVICE — BOWL MED L 32OZ PLAS W/ MOLD GRAD EZ OPN PEEL PCH

## (undated) DEVICE — KIT DRP FOR RIO ROBOTIC ARM ASST SYS

## (undated) DEVICE — SUTURE VICRYL SZ 1 L36IN ABSRB UD L36MM CT-1 1/2 CIR J947H

## (undated) DEVICE — APPLICATOR MEDICATED 26 CC SOLUTION HI LT ORNG CHLORAPREP

## (undated) DEVICE — SUTURE STRATAFIX SZ 3-0 30CM NONABSORB UD 26MM FS 3/8 SXMP2B412

## (undated) DEVICE — SLIM BODY SKIN STAPLER: Brand: APPOSE ULC

## (undated) DEVICE — SOLUTION IRRIG 3000ML 0.9% SOD CHL FLX CONT 0797208] ICU MEDICAL INC]

## (undated) DEVICE — KENDALL SCD EXPRESS SLEEVES, KNEE LENGTH, MEDIUM: Brand: KENDALL SCD

## (undated) DEVICE — SPONGE GZ W4XL4IN COT 12 PLY TYP VII WVN C FLD DSGN STERILE

## (undated) DEVICE — NEEDLE SPNL L3.5IN PNK HUB S STL REG WALL FIT STYL W/ QNCKE

## (undated) DEVICE — GLOVE ORTHO 8   MSG9480

## (undated) DEVICE — TRANSFER SET 3": Brand: MEDLINE INDUSTRIES, INC.

## (undated) DEVICE — SUTURE STRATAFIX SPRL SZ 1 L14IN ABSRB VLT L48CM CTX 1/2 SXPD2B405

## (undated) DEVICE — DEVON™ KNEE AND BODY STRAP 60" X 3" (1.5 M X 7.6 CM): Brand: DEVON

## (undated) DEVICE — SUTURE VCRL 2-0 L27IN ABSRB UD PS-2 L19MM 1/2 CIR J428H

## (undated) DEVICE — CUSTOM CAST PD STR

## (undated) DEVICE — HANDLE LT SNAP ON ULT DURABLE LENS FOR TRUMPF ALC DISPOSABLE

## (undated) DEVICE — TUBESET BNE PREP CO2 CARBOJET

## (undated) DEVICE — BLADE SURG SAW S STL NAR OSC W/ SERR EDGE DISP

## (undated) DEVICE — DRAPE,U/ SHT,SPLIT,PLAS,STERIL: Brand: MEDLINE

## (undated) DEVICE — Device

## (undated) DEVICE — T4 HOOD

## (undated) DEVICE — SUTURE VCRL SZ 2-0 L36IN ABSRB UD L40MM CT 1/2 CIR J957H

## (undated) DEVICE — SUTURE STRATAFIX SYMMETRIC SZ 1 L18IN ABSRB VLT CT1 L36CM SXPP1A404

## (undated) DEVICE — DRAPE,ORTHOMAX,EXTREMITY: Brand: MEDLINE

## (undated) DEVICE — TOTAL JOINT-MRMC: Brand: MEDLINE INDUSTRIES, INC.

## (undated) DEVICE — 60-7070-105 TRNQT,DPSB,PLC BLUE: Brand: MEDLINE RENEWAL

## (undated) DEVICE — DRESSING SURG 4X14 IN POSTOP SIL BORD MEPILEX

## (undated) DEVICE — INTENDED FOR TISSUE SEPARATION, AND OTHER PROCEDURES THAT REQUIRE A SHARP SURGICAL BLADE TO PUNCTURE OR CUT.: Brand: BARD-PARKER ® CARBON RIB-BACK BLADES

## (undated) DEVICE — NEEDLE HYPO 18GA L1.5IN PNK S STL HUB POLYPR SHLD REG BVL

## (undated) DEVICE — SOLUTION PULSED LAVAGE XPERIENCE 500ML NO-RINSE

## (undated) DEVICE — SUTURE VCRL SZ 1 L27IN ABSRB VLT L36MM CT-1 1/2 CIR J341H

## (undated) DEVICE — TRAY,IRRIGATION,PISTON SYRINGE,60ML,STRL: Brand: MEDLINE

## (undated) DEVICE — ZIMMER® STERILE DISPOSABLE TOURNIQUET CUFF WITH PROTECTIVE SLEEVE AND PLC, DUAL PORT, SINGLE BLADDER, 34 IN. (86 CM)

## (undated) DEVICE — SOLUTION IRRIG 1000ML H2O STRL BLT

## (undated) DEVICE — REM POLYHESIVE ADULT PATIENT RETURN ELECTRODE: Brand: VALLEYLAB